# Patient Record
Sex: MALE | Race: WHITE | NOT HISPANIC OR LATINO | Employment: OTHER | URBAN - METROPOLITAN AREA
[De-identification: names, ages, dates, MRNs, and addresses within clinical notes are randomized per-mention and may not be internally consistent; named-entity substitution may affect disease eponyms.]

---

## 2023-08-29 LAB
ALANINE AMINOTRANSFERASE (SGPT) (U/L) IN SER/PLAS: 18 U/L (ref 10–52)
ALBUMIN (G/DL) IN SER/PLAS: 4.6 G/DL (ref 3.4–5)
ALKALINE PHOSPHATASE (U/L) IN SER/PLAS: 74 U/L (ref 33–136)
ANION GAP IN SER/PLAS: 11 MMOL/L (ref 10–20)
ASPARTATE AMINOTRANSFERASE (SGOT) (U/L) IN SER/PLAS: 24 U/L (ref 9–39)
BILIRUBIN TOTAL (MG/DL) IN SER/PLAS: 1.2 MG/DL (ref 0–1.2)
CALCIUM (MG/DL) IN SER/PLAS: 9.9 MG/DL (ref 8.6–10.3)
CARBON DIOXIDE, TOTAL (MMOL/L) IN SER/PLAS: 26 MMOL/L (ref 21–32)
CHLORIDE (MMOL/L) IN SER/PLAS: 106 MMOL/L (ref 98–107)
CHOLESTEROL (MG/DL) IN SER/PLAS: 108 MG/DL (ref 0–199)
CHOLESTEROL IN HDL (MG/DL) IN SER/PLAS: 45.5 MG/DL
CHOLESTEROL/HDL RATIO: 2.4
CREATININE (MG/DL) IN SER/PLAS: 1.33 MG/DL (ref 0.5–1.3)
GFR MALE: 51 ML/MIN/1.73M2
GLUCOSE (MG/DL) IN SER/PLAS: 117 MG/DL (ref 74–99)
LDL: 52 MG/DL (ref 0–99)
MAGNESIUM (MG/DL) IN SER/PLAS: 2.42 MG/DL (ref 1.6–2.4)
POTASSIUM (MMOL/L) IN SER/PLAS: 5.3 MMOL/L (ref 3.5–5.3)
PROTEIN TOTAL: 7.3 G/DL (ref 6.4–8.2)
SODIUM (MMOL/L) IN SER/PLAS: 138 MMOL/L (ref 136–145)
THYROTROPIN (MIU/L) IN SER/PLAS BY DETECTION LIMIT <= 0.05 MIU/L: 3.63 MIU/L (ref 0.44–3.98)
TRIGLYCERIDE (MG/DL) IN SER/PLAS: 51 MG/DL (ref 0–149)
UREA NITROGEN (MG/DL) IN SER/PLAS: 48 MG/DL (ref 6–23)
VLDL: 10 MG/DL (ref 0–40)

## 2023-10-24 DIAGNOSIS — I10 ESSENTIAL (PRIMARY) HYPERTENSION: ICD-10-CM

## 2023-10-24 RX ORDER — SPIRONOLACTONE 25 MG/1
TABLET ORAL DAILY
Qty: 45 TABLET | Refills: 3 | Status: SHIPPED | OUTPATIENT
Start: 2023-10-24

## 2023-11-07 ENCOUNTER — LAB (OUTPATIENT)
Dept: LAB | Facility: LAB | Age: 88
End: 2023-11-07
Payer: MEDICARE

## 2023-11-07 DIAGNOSIS — R73.9 HYPERGLYCEMIA, UNSPECIFIED: ICD-10-CM

## 2023-11-07 DIAGNOSIS — N18.30 CHRONIC KIDNEY DISEASE, STAGE 3 UNSPECIFIED (MULTI): Primary | ICD-10-CM

## 2023-11-07 LAB
ANION GAP SERPL CALC-SCNC: 11 MMOL/L (ref 10–20)
BUN SERPL-MCNC: 34 MG/DL (ref 6–23)
CALCIUM SERPL-MCNC: 9.6 MG/DL (ref 8.6–10.3)
CHLORIDE SERPL-SCNC: 107 MMOL/L (ref 98–107)
CO2 SERPL-SCNC: 25 MMOL/L (ref 21–32)
CREAT SERPL-MCNC: 1.22 MG/DL (ref 0.5–1.3)
EST. AVERAGE GLUCOSE BLD GHB EST-MCNC: 131 MG/DL
GFR SERPL CREATININE-BSD FRML MDRD: 57 ML/MIN/1.73M*2
GLUCOSE SERPL-MCNC: 102 MG/DL (ref 74–99)
HBA1C MFR BLD: 6.2 %
POTASSIUM SERPL-SCNC: 4.5 MMOL/L (ref 3.5–5.3)
SODIUM SERPL-SCNC: 138 MMOL/L (ref 136–145)

## 2023-11-07 PROCEDURE — 83036 HEMOGLOBIN GLYCOSYLATED A1C: CPT

## 2023-11-07 PROCEDURE — 36415 COLL VENOUS BLD VENIPUNCTURE: CPT

## 2023-11-07 PROCEDURE — 80048 BASIC METABOLIC PNL TOTAL CA: CPT

## 2023-12-07 ENCOUNTER — ANCILLARY PROCEDURE (OUTPATIENT)
Dept: RADIOLOGY | Facility: CLINIC | Age: 88
End: 2023-12-07
Payer: MEDICARE

## 2023-12-07 DIAGNOSIS — N64.9 DISORDER OF BREAST: ICD-10-CM

## 2023-12-07 PROCEDURE — G0279 TOMOSYNTHESIS, MAMMO: HCPCS | Performed by: RADIOLOGY

## 2023-12-07 PROCEDURE — 77066 DX MAMMO INCL CAD BI: CPT

## 2023-12-07 PROCEDURE — 77066 DX MAMMO INCL CAD BI: CPT | Performed by: RADIOLOGY

## 2023-12-20 ENCOUNTER — ANCILLARY PROCEDURE (OUTPATIENT)
Dept: RADIOLOGY | Facility: CLINIC | Age: 88
End: 2023-12-20
Payer: MEDICARE

## 2023-12-20 DIAGNOSIS — R92.2 INCONCLUSIVE MAMMOGRAM: ICD-10-CM

## 2023-12-20 DIAGNOSIS — R92.30 DENSE BREASTS, UNSPECIFIED: ICD-10-CM

## 2023-12-20 PROCEDURE — 76642 ULTRASOUND BREAST LIMITED: CPT | Mod: BILATERAL PROCEDURE | Performed by: RADIOLOGY

## 2023-12-20 PROCEDURE — 76642 ULTRASOUND BREAST LIMITED: CPT | Mod: 50

## 2024-02-07 ENCOUNTER — TRANSCRIBE ORDERS (OUTPATIENT)
Dept: SCHEDULING | Age: 89
End: 2024-02-07
Payer: MEDICARE

## 2024-02-07 DIAGNOSIS — R42 DIZZINESS AND GIDDINESS: Primary | ICD-10-CM

## 2024-02-07 DIAGNOSIS — Z13.6 ENCOUNTER FOR SCREENING FOR CARDIOVASCULAR DISORDERS: Primary | ICD-10-CM

## 2024-02-20 ENCOUNTER — APPOINTMENT (OUTPATIENT)
Dept: RADIOLOGY | Facility: HOSPITAL | Age: 89
End: 2024-02-20
Payer: MEDICARE

## 2024-02-20 ENCOUNTER — HOSPITAL ENCOUNTER (EMERGENCY)
Facility: HOSPITAL | Age: 89
Discharge: HOME | End: 2024-02-20
Attending: STUDENT IN AN ORGANIZED HEALTH CARE EDUCATION/TRAINING PROGRAM
Payer: MEDICARE

## 2024-02-20 VITALS
BODY MASS INDEX: 23.54 KG/M2 | TEMPERATURE: 97.2 F | SYSTOLIC BLOOD PRESSURE: 130 MMHG | DIASTOLIC BLOOD PRESSURE: 60 MMHG | RESPIRATION RATE: 16 BRPM | HEART RATE: 68 BPM | OXYGEN SATURATION: 98 % | WEIGHT: 150 LBS | HEIGHT: 67 IN

## 2024-02-20 DIAGNOSIS — S70.02XA CONTUSION OF LEFT HIP, INITIAL ENCOUNTER: Primary | ICD-10-CM

## 2024-02-20 PROBLEM — Z95.818 S/P MITRAL VALVE CLIP IMPLANTATION: Status: ACTIVE | Noted: 2024-02-20

## 2024-02-20 PROBLEM — I25.10 CORONARY ARTERY DISEASE: Status: ACTIVE | Noted: 2024-02-20

## 2024-02-20 PROBLEM — M16.12 ARTHRITIS OF LEFT HIP: Status: ACTIVE | Noted: 2024-02-20

## 2024-02-20 PROBLEM — R06.09 DYSPNEA ON EXERTION: Status: ACTIVE | Noted: 2024-02-20

## 2024-02-20 PROBLEM — M43.17 ACQUIRED SPONDYLOLISTHESIS OF LUMBOSACRAL REGION: Status: ACTIVE | Noted: 2024-02-20

## 2024-02-20 PROBLEM — K57.90 DIVERTICULOSIS: Status: ACTIVE | Noted: 2024-02-20

## 2024-02-20 PROBLEM — I10 HTN (HYPERTENSION): Status: ACTIVE | Noted: 2024-02-20

## 2024-02-20 PROBLEM — E78.5 DYSLIPIDEMIA: Status: ACTIVE | Noted: 2024-02-20

## 2024-02-20 PROBLEM — M19.90 DJD (DEGENERATIVE JOINT DISEASE): Status: ACTIVE | Noted: 2024-02-20

## 2024-02-20 PROBLEM — J44.9 COPD (CHRONIC OBSTRUCTIVE PULMONARY DISEASE) (MULTI): Status: ACTIVE | Noted: 2024-02-20

## 2024-02-20 PROBLEM — I34.0 MITRAL VALVE INSUFFICIENCY: Status: ACTIVE | Noted: 2024-02-20

## 2024-02-20 PROBLEM — Z98.890 S/P MITRAL VALVE CLIP IMPLANTATION: Status: ACTIVE | Noted: 2024-02-20

## 2024-02-20 PROBLEM — C61 PROSTATE CA (MULTI): Status: ACTIVE | Noted: 2024-02-20

## 2024-02-20 PROCEDURE — 73552 X-RAY EXAM OF FEMUR 2/>: CPT | Mod: LT

## 2024-02-20 PROCEDURE — 72170 X-RAY EXAM OF PELVIS: CPT | Mod: FOREIGN READ | Performed by: RADIOLOGY

## 2024-02-20 PROCEDURE — 72170 X-RAY EXAM OF PELVIS: CPT

## 2024-02-20 PROCEDURE — 2500000004 HC RX 250 GENERAL PHARMACY W/ HCPCS (ALT 636 FOR OP/ED): Performed by: STUDENT IN AN ORGANIZED HEALTH CARE EDUCATION/TRAINING PROGRAM

## 2024-02-20 PROCEDURE — 99284 EMERGENCY DEPT VISIT MOD MDM: CPT

## 2024-02-20 PROCEDURE — 96372 THER/PROPH/DIAG INJ SC/IM: CPT

## 2024-02-20 PROCEDURE — 73552 X-RAY EXAM OF FEMUR 2/>: CPT | Mod: LEFT SIDE | Performed by: RADIOLOGY

## 2024-02-20 RX ORDER — ACETAMINOPHEN 325 MG/1
650 TABLET ORAL EVERY 6 HOURS PRN
Qty: 30 TABLET | Refills: 0 | Status: SHIPPED | OUTPATIENT
Start: 2024-02-20

## 2024-02-20 RX ORDER — CYCLOBENZAPRINE HCL 10 MG
5 TABLET ORAL 3 TIMES DAILY
Qty: 15 TABLET | Refills: 0 | Status: SHIPPED | OUTPATIENT
Start: 2024-02-20 | End: 2024-03-01

## 2024-02-20 RX ORDER — ORPHENADRINE CITRATE 30 MG/ML
60 INJECTION INTRAMUSCULAR; INTRAVENOUS ONCE
Status: COMPLETED | OUTPATIENT
Start: 2024-02-20 | End: 2024-02-20

## 2024-02-20 RX ORDER — KETOROLAC TROMETHAMINE 30 MG/ML
30 INJECTION, SOLUTION INTRAMUSCULAR; INTRAVENOUS ONCE
Status: COMPLETED | OUTPATIENT
Start: 2024-02-20 | End: 2024-02-20

## 2024-02-20 RX ORDER — IBUPROFEN 600 MG/1
600 TABLET ORAL EVERY 8 HOURS PRN
Qty: 30 TABLET | Refills: 0 | Status: SHIPPED | OUTPATIENT
Start: 2024-02-20

## 2024-02-20 RX ADMIN — ORPHENADRINE CITRATE 60 MG: 60 INJECTION INTRAMUSCULAR; INTRAVENOUS at 11:39

## 2024-02-20 RX ADMIN — KETOROLAC TROMETHAMINE 30 MG: 30 INJECTION, SOLUTION INTRAMUSCULAR; INTRAVENOUS at 11:39

## 2024-02-20 ASSESSMENT — PAIN DESCRIPTION - ORIENTATION: ORIENTATION: LEFT

## 2024-02-20 ASSESSMENT — PAIN - FUNCTIONAL ASSESSMENT: PAIN_FUNCTIONAL_ASSESSMENT: 0-10

## 2024-02-20 ASSESSMENT — PAIN SCALES - GENERAL: PAINLEVEL_OUTOF10: 1

## 2024-02-20 ASSESSMENT — COLUMBIA-SUICIDE SEVERITY RATING SCALE - C-SSRS
1. IN THE PAST MONTH, HAVE YOU WISHED YOU WERE DEAD OR WISHED YOU COULD GO TO SLEEP AND NOT WAKE UP?: NO
6. HAVE YOU EVER DONE ANYTHING, STARTED TO DO ANYTHING, OR PREPARED TO DO ANYTHING TO END YOUR LIFE?: NO
2. HAVE YOU ACTUALLY HAD ANY THOUGHTS OF KILLING YOURSELF?: NO

## 2024-02-20 ASSESSMENT — PAIN DESCRIPTION - LOCATION: LOCATION: HIP

## 2024-02-20 ASSESSMENT — PAIN DESCRIPTION - PAIN TYPE: TYPE: ACUTE PAIN

## 2024-02-20 NOTE — ED PROVIDER NOTES
HPI   Chief Complaint   Patient presents with    Hip Pain     PT. STATES FELL ONTO LEFT SIDE ABOUT WEEK AND HALF AGO, STATES WAS TRYING TO GO UP A STEP AND WAS UNABLE TO, STATES HAS PROBLEMS WITH LEGS. PT. NOW C/O PAIN TO LEFT HIP, HX JULIO. HIP REPLACEMENTS. DENIES NUMBNESS/TINGLING. PT. AMBULATING WITH CANE.        HPI     Patient is a 88-year-old male present emergency department after a fall, landed on his left side, left hip pain.  No anticoagulation that he is on.  States that he tried to manage his symptoms at home however today he almost fell again and noted the pain in the left hip was more prominent.  Took some Tylenol at home which helped a little bit.  No numbness, tingling.  Is able to range it comfortably.  Denied his head.  No loss conscious.  Otherwise at his baseline state of health.               Arvind Coma Scale Score: 15                     Patient History   Past Medical History:   Diagnosis Date    Hypertension     Personal history of malignant neoplasm of prostate     History of malignant neoplasm of prostate    Personal history of other malignant neoplasm of skin     History of malignant neoplasm of skin     Past Surgical History:   Procedure Laterality Date    MR HEAD ANGIO WO IV CONTRAST  2/22/2023    MR HEAD ANGIO WO IV CONTRAST PAR MRI    MR NECK ANGIO WO IV CONTRAST  2/22/2023    MR NECK ANGIO WO IV CONTRAST PAR MRI    OTHER SURGICAL HISTORY  05/11/2022    Cyst excision    OTHER SURGICAL HISTORY  05/11/2022    Coronary artery stent placement    OTHER SURGICAL HISTORY  05/17/2022    Radical prostatectomy    OTHER SURGICAL HISTORY  05/17/2022    Cardiac catheterization    OTHER SURGICAL HISTORY  05/17/2022    Hip replacement    OTHER SURGICAL HISTORY  05/17/2022    Hip replacement     Family History   Problem Relation Name Age of Onset    Breast cancer Daughter      Breast cancer Maternal Grandmother       Social History     Tobacco Use    Smoking status: Former     Types: Cigarettes     Smokeless tobacco: Never   Vaping Use    Vaping Use: Never used   Substance Use Topics    Alcohol use: Yes     Comment: SOCIALLY    Drug use: Never       Physical Exam   ED Triage Vitals [02/20/24 1039]   Temperature Heart Rate Respirations BP   36.2 °C (97.2 °F) 68 16 130/60      Pulse Ox Temp Source Heart Rate Source Patient Position   98 % Temporal Monitor Sitting      BP Location FiO2 (%)     Right arm --       Physical Exam  Vitals and nursing note reviewed.   Constitutional:       General: He is not in acute distress.     Appearance: He is well-developed.   HENT:      Head: Normocephalic and atraumatic.   Eyes:      Conjunctiva/sclera: Conjunctivae normal.   Cardiovascular:      Rate and Rhythm: Normal rate and regular rhythm.      Heart sounds: No murmur heard.  Pulmonary:      Effort: Pulmonary effort is normal. No respiratory distress.      Breath sounds: Normal breath sounds.   Abdominal:      Palpations: Abdomen is soft.      Tenderness: There is no abdominal tenderness.   Musculoskeletal:         General: Tenderness present. No swelling.      Cervical back: Neck supple.   Skin:     General: Skin is warm and dry.      Capillary Refill: Capillary refill takes less than 2 seconds.   Neurological:      Mental Status: He is alert.   Psychiatric:         Mood and Affect: Mood normal.         ED Course & MDM   Diagnoses as of 02/21/24 1554   Contusion of left hip, initial encounter       Medical Decision Making  88-year-old male presenting as above on bedside assessment comfortable, conversant hemodynamically stable.  Ranges comfortably, reproducible lateral left hip tenderness x-rays pending, has been able to ambulate and range comfortably here low suspicion for fracture.  Given Toradol, Norflex, pending results will plan for disposition of home-going.    X-rays negative for any acute fracture, symptomatically improved ambulating here.  Will be discharged home with outpatient follow-up  recommended.    Procedure  Procedures     Nisa Nascimento MD  02/21/24 5489

## 2024-02-20 NOTE — ED TRIAGE NOTES
PT. STATES FELL ONTO LEFT SIDE ABOUT WEEK AND HALF AGO, STATES WAS TRYING TO GO UP A STEP AND WAS UNABLE TO, STATES HAS PROBLEMS WITH LEGS. PT. NOW C/O PAIN TO LEFT HIP, HX JULIO. HIP REPLACEMENTS. DENIES NUMBNESS/TINGLING. PT. AMBULATING WITH CANE.

## 2024-02-20 NOTE — ED TRIAGE NOTES
This patient was seen and examined in triage    HPI:  Patient is a healthy nontoxic-appearing 88-year-old male with past medical history of COPD, acquired spondylolisthesis of the lumbar sacral region, coronary artery disease, diverticulosis, degenerative joint disease, dyslipidemia, hypertension, mitral valve insufficiency, prostate cancer, hip replacement, presents to the emergency today for complaint of fall.  Patient states 2 weeks ago he was attempting to walk up a set of stairs when he was unable to fully lift his right leg and he fell backwards landing on his left hip.  Patient denies striking his head, loss of consciousness, headache pain with visual disturbances, neck pain, numbness or tingling.  Patient states he has had worsening pain in the left hip left upper leg since the fall and has been using a cane to ambulate.  Patient denies chest pain, short of breath, difficulty breathing, dizziness, lightheadedness, abdominal pain nausea, vomiting or diarrhea or constipation, fever, shaking, or chills.    Focused PE:  Gen: Well-appearing, not in acute distress  Cardiovascular: Regular rate, normal rhythm, no murmur, no gallop  Respiratory: No adventitious lung sounds auscultated.  Neuro:  Alert and Oriented, speech clear and coherent  Musculoskeletal: Reproducible tenderness upon palpation over the left lateral hip and left lateral femur, no knee tenderness, no decreased range of motion    Plan:  X-rays of the left hip and left femur ordered from triage.    For the remainder the patient's work-up and ED course, please see the main ED provider note.  We discussed need for diagnostic testing including lab studies and imaging.  We also discussed that they may be asked to wait in the waiting room while these test are pending.  They understand that if they choose to leave without having the testing completed or resulted that we cannot rule out acute life-threatening illnesses and the risks involved to lead to  worsening condition, permanent disability or even death.

## 2024-02-21 DIAGNOSIS — I10 ESSENTIAL (PRIMARY) HYPERTENSION: ICD-10-CM

## 2024-02-22 RX ORDER — ATORVASTATIN CALCIUM 20 MG/1
20 TABLET, FILM COATED ORAL DAILY
Qty: 90 TABLET | Refills: 1 | Status: SHIPPED | OUTPATIENT
Start: 2024-02-22

## 2024-03-01 PROBLEM — Z98.890 HISTORY OF REPAIR OF HIP JOINT: Status: ACTIVE | Noted: 2024-03-01

## 2024-03-01 PROBLEM — H52.223 HYPEROPIA OF BOTH EYES WITH REGULAR ASTIGMATISM: Status: ACTIVE | Noted: 2024-03-01

## 2024-03-01 PROBLEM — H53.10 EYE STRAIN, BILATERAL: Status: ACTIVE | Noted: 2024-03-01

## 2024-03-01 PROBLEM — I50.9 CONGESTIVE HEART FAILURE (MULTI): Status: ACTIVE | Noted: 2024-03-01

## 2024-03-01 PROBLEM — H52.03 HYPEROPIA OF BOTH EYES WITH REGULAR ASTIGMATISM: Status: ACTIVE | Noted: 2024-03-01

## 2024-03-01 PROBLEM — M54.50 LOW BACK PAIN: Status: ACTIVE | Noted: 2024-03-01

## 2024-03-01 PROBLEM — S70.00XA CONTUSION OF HIP: Status: ACTIVE | Noted: 2024-03-01

## 2024-03-01 PROBLEM — H52.223 REGULAR ASTIGMATISM OF BOTH EYES WITH PRESBYOPIA: Status: ACTIVE | Noted: 2024-03-01

## 2024-03-01 PROBLEM — E78.5 HYPERLIPOPROTEINEMIA: Status: ACTIVE | Noted: 2024-02-20

## 2024-03-01 PROBLEM — H25.13 CATARACT, NUCLEAR SCLEROTIC, BOTH EYES: Status: ACTIVE | Noted: 2023-05-02

## 2024-03-01 PROBLEM — Z85.828 HISTORY OF MALIGNANT NEOPLASM OF SKIN: Status: ACTIVE | Noted: 2024-03-01

## 2024-03-01 PROBLEM — M25.552 LEFT HIP PAIN: Status: ACTIVE | Noted: 2024-03-01

## 2024-03-01 PROBLEM — H52.4 REGULAR ASTIGMATISM OF BOTH EYES WITH PRESBYOPIA: Status: ACTIVE | Noted: 2024-03-01

## 2024-03-05 ENCOUNTER — HOSPITAL ENCOUNTER (OUTPATIENT)
Dept: VASCULAR MEDICINE | Facility: CLINIC | Age: 89
End: 2024-03-05
Payer: MEDICARE

## 2024-03-05 ENCOUNTER — LAB (OUTPATIENT)
Dept: LAB | Facility: LAB | Age: 89
End: 2024-03-05
Payer: MEDICARE

## 2024-03-05 ENCOUNTER — HOSPITAL ENCOUNTER (OUTPATIENT)
Dept: VASCULAR MEDICINE | Facility: CLINIC | Age: 89
Discharge: HOME | End: 2024-03-05
Payer: MEDICARE

## 2024-03-05 DIAGNOSIS — R73.9 HYPERGLYCEMIA, UNSPECIFIED: Primary | ICD-10-CM

## 2024-03-05 DIAGNOSIS — I10 ESSENTIAL (PRIMARY) HYPERTENSION: ICD-10-CM

## 2024-03-05 DIAGNOSIS — E83.52 HYPERCALCEMIA: ICD-10-CM

## 2024-03-05 DIAGNOSIS — Z13.6 ENCOUNTER FOR SCREENING FOR CARDIOVASCULAR DISORDERS: ICD-10-CM

## 2024-03-05 DIAGNOSIS — R42 DIZZINESS AND GIDDINESS: ICD-10-CM

## 2024-03-05 LAB
ANION GAP SERPL CALC-SCNC: 13 MMOL/L (ref 10–20)
BUN SERPL-MCNC: 45 MG/DL (ref 6–23)
CALCIUM SERPL-MCNC: 9.5 MG/DL (ref 8.6–10.3)
CHLORIDE SERPL-SCNC: 107 MMOL/L (ref 98–107)
CO2 SERPL-SCNC: 28 MMOL/L (ref 21–32)
CREAT SERPL-MCNC: 1.27 MG/DL (ref 0.5–1.3)
EGFRCR SERPLBLD CKD-EPI 2021: 54 ML/MIN/1.73M*2
EST. AVERAGE GLUCOSE BLD GHB EST-MCNC: 134 MG/DL
GLUCOSE SERPL-MCNC: 111 MG/DL (ref 74–99)
HBA1C MFR BLD: 6.3 %
POTASSIUM SERPL-SCNC: 4.5 MMOL/L (ref 3.5–5.3)
PTH-INTACT SERPL-MCNC: 60.5 PG/ML (ref 18.5–88)
SODIUM SERPL-SCNC: 143 MMOL/L (ref 136–145)

## 2024-03-05 PROCEDURE — 36415 COLL VENOUS BLD VENIPUNCTURE: CPT

## 2024-03-05 PROCEDURE — 80048 BASIC METABOLIC PNL TOTAL CA: CPT

## 2024-03-05 PROCEDURE — 93880 EXTRACRANIAL BILAT STUDY: CPT

## 2024-03-05 PROCEDURE — 93880 EXTRACRANIAL BILAT STUDY: CPT | Performed by: INTERNAL MEDICINE

## 2024-03-05 PROCEDURE — 83036 HEMOGLOBIN GLYCOSYLATED A1C: CPT

## 2024-03-05 PROCEDURE — 83970 ASSAY OF PARATHORMONE: CPT

## 2024-03-06 ENCOUNTER — OFFICE VISIT (OUTPATIENT)
Dept: CARDIOLOGY | Facility: CLINIC | Age: 89
End: 2024-03-06
Payer: MEDICARE

## 2024-03-06 VITALS
DIASTOLIC BLOOD PRESSURE: 60 MMHG | RESPIRATION RATE: 16 BRPM | WEIGHT: 150 LBS | OXYGEN SATURATION: 97 % | HEART RATE: 61 BPM | SYSTOLIC BLOOD PRESSURE: 102 MMHG | BODY MASS INDEX: 23.49 KG/M2

## 2024-03-06 DIAGNOSIS — I50.42 CHRONIC COMBINED SYSTOLIC AND DIASTOLIC CONGESTIVE HEART FAILURE (MULTI): ICD-10-CM

## 2024-03-06 DIAGNOSIS — Z95.818 S/P MITRAL VALVE CLIP IMPLANTATION: Primary | ICD-10-CM

## 2024-03-06 DIAGNOSIS — Z98.890 S/P MITRAL VALVE CLIP IMPLANTATION: Primary | ICD-10-CM

## 2024-03-06 PROCEDURE — 1125F AMNT PAIN NOTED PAIN PRSNT: CPT | Performed by: STUDENT IN AN ORGANIZED HEALTH CARE EDUCATION/TRAINING PROGRAM

## 2024-03-06 PROCEDURE — 1036F TOBACCO NON-USER: CPT | Performed by: STUDENT IN AN ORGANIZED HEALTH CARE EDUCATION/TRAINING PROGRAM

## 2024-03-06 PROCEDURE — 1157F ADVNC CARE PLAN IN RCRD: CPT | Performed by: STUDENT IN AN ORGANIZED HEALTH CARE EDUCATION/TRAINING PROGRAM

## 2024-03-06 PROCEDURE — 99214 OFFICE O/P EST MOD 30 MIN: CPT | Performed by: STUDENT IN AN ORGANIZED HEALTH CARE EDUCATION/TRAINING PROGRAM

## 2024-03-06 PROCEDURE — 3078F DIAST BP <80 MM HG: CPT | Performed by: STUDENT IN AN ORGANIZED HEALTH CARE EDUCATION/TRAINING PROGRAM

## 2024-03-06 PROCEDURE — 3074F SYST BP LT 130 MM HG: CPT | Performed by: STUDENT IN AN ORGANIZED HEALTH CARE EDUCATION/TRAINING PROGRAM

## 2024-03-06 NOTE — PROGRESS NOTES
Signed  Encounter Date: 9/20/2023  Nelson Shin MD PhD         Diagnoses/Problems  Assessed    · Coronary artery disease (414.00) (I25.10)   · HTN (hypertension) (401.9) (I10)     Orders  Coronary artery disease, HTN (hypertension)    · Basic Metabolic Panel; Status:Active - Retrospective By Protocol Authorization;  Requested for:[id number];    · Magnesium, Serum; Status:Active - Retrospective By Protocol Authorization; Requested  for:[id number];      Chief Complaint        RAMSES VALENTE is being seen for a 6 month follow-up of. Echo, labs results      History of Present Illness  May 23, 2022  Patient is an 87-year-old male who presented with non-ST elevation MI to [facility] in May 2022. He was found to have ejection fraction of 40 to 45% with severe mitral regurgitation. He underwent right and left heart catheterization and he was found to have significant lesion in proximal to mid LAD which was treated successfully with a 2.75 x 38 mm Synergy drug-eluting stent postdilated with a 3.0 NC balloon. He underwent transesophageal echocardiogram that confirmed the presence of severe mitral regurgitation. He was sent to be evaluated at [facility] the structural team for consideration of MitraClip. He had the initial visit done last week. Awaiting to hear back.  Currently patient has shortness of breath with activity. Denies having chest pain. Currently takes torsemide 20 mg daily his EKG today shows sinus rhythm with a rate of 59 and left bundle branch block pattern     August 18, 2022  Patient had echocardiogram done yesterday that showed ejection fraction of 35 to 40% and a cardiac MRI was suggested for further evaluation of the valves after the MitraClip placement.  Patient himself today reports having no shortness of breath or palpitation no chest pain. Reports overall he is doing well.     November 16, 2022  Patient has received his MitraClip. MRI post that showed mild residual mitral regurgitation with  ejection fraction of 35%.  Denies having chest pain or shortness of breath.     March 8, 2023  Patient denies having chest pain or shortness of breath or palpitation.  He had his echocardiogram done on March 1, 2023 that showed ejection fraction of 45 to 50%, status post MitraClip with no significant residual mitral regurgitation. He also has moderate aortic valve stenosis.  His weight has been stable since last time.     September 20, 2023  Patient had his echocardiogram done today that shows mildly reduced ejection fraction of 45 to 50%. There is residual mild MR. He has mild aortic valve stenosis.  He denies having chest pain or shortness of breath or palpitation and reports he has been walking regularly and hiking.  His labs from August 2023 showed creatinine of 1.3.             Follow up visit    03/06/24    Patrick Vázquez is a 88 y.o. male who is here for follow-up after about 6 months.  His main complaint is sometimes he had dizziness and had a fall.  Primary care physician stopped Lasix.  He feels better since then.      Patient denies any chest pain, shortness of breath, palpitation.    Past Medical History  Past Medical History:   Diagnosis Date    Hypertension     Personal history of malignant neoplasm of prostate     History of malignant neoplasm of prostate    Personal history of other malignant neoplasm of skin     History of malignant neoplasm of skin        Past Surgical History  Past Surgical History:   Procedure Laterality Date    MR HEAD ANGIO WO IV CONTRAST  2/22/2023    MR HEAD ANGIO WO IV CONTRAST PAR MRI    MR NECK ANGIO WO IV CONTRAST  2/22/2023    MR NECK ANGIO WO IV CONTRAST PAR MRI    OTHER SURGICAL HISTORY  05/11/2022    Cyst excision    OTHER SURGICAL HISTORY  05/11/2022    Coronary artery stent placement    OTHER SURGICAL HISTORY  05/17/2022    Radical prostatectomy    OTHER SURGICAL HISTORY  05/17/2022    Cardiac catheterization    OTHER SURGICAL HISTORY  05/17/2022    Hip replacement     OTHER SURGICAL HISTORY  05/17/2022    Hip replacement        Social history  Social History     Socioeconomic History    Marital status:      Spouse name: Not on file    Number of children: Not on file    Years of education: Not on file    Highest education level: Not on file   Occupational History    Not on file   Tobacco Use    Smoking status: Former     Types: Cigarettes    Smokeless tobacco: Never   Vaping Use    Vaping Use: Never used   Substance and Sexual Activity    Alcohol use: Yes     Comment: SOCIALLY    Drug use: Never    Sexual activity: Not on file   Other Topics Concern    Not on file   Social History Narrative    Not on file     Social Determinants of Health     Financial Resource Strain: Not on file   Food Insecurity: Not on file   Transportation Needs: Not on file   Physical Activity: Not on file   Stress: Not on file   Social Connections: Not on file   Intimate Partner Violence: Not on file   Housing Stability: Not on file        Family History  Family History   Problem Relation Name Age of Onset    Breast cancer Daughter      Breast cancer Maternal Grandmother          12 system point of review is negative except for what described in history of present illness    Allergies:  Allergies   Allergen Reactions    Bee Venom Protein (Honey Bee) Hives and Itching     carries an epi pen        Outpatient Medications:  Current Outpatient Medications   Medication Instructions    acetaminophen (TYLENOL) 650 mg, oral, Every 6 hours PRN    atorvastatin (LIPITOR) 20 mg, oral, Daily    cyclobenzaprine (FLEXERIL) 5 mg, oral, 3 times daily    ibuprofen 600 mg, oral, Every 8 hours PRN    spironolactone (Aldactone) 25 mg tablet oral, Daily         Last Recorded Vitals:      8/18/2022     1:13 PM 11/16/2022    10:36 AM 2/22/2023    11:56 AM 2/22/2023     8:53 PM 3/8/2023     9:59 AM 2/20/2024    10:39 AM 3/6/2024    10:15 AM   Vitals   Systolic 94 116 112  115 130 102   Diastolic 46 60 59  62 60 60   Heart  "Rate 68 67 68  70 68 61   Temp   36 °C (96.8 °F)   36.2 °C (97.2 °F)    Resp   18   16 16   Height (in) 1.727 m (5' 8\") 1.727 m (5' 8\") 1.727 m (5' 7.99\") 1.727 m (5' 7.99\") 1.727 m (5' 8\") 1.702 m (5' 7\")    Weight (lb) 154.13 157 149.91 149.91 158 150 150   BMI 23.43 kg/m2 23.87 kg/m2 22.8 kg/m2 22.8 kg/m2 24.02 kg/m2 23.49 kg/m2 23.49 kg/m2   BSA (m2) 1.83 m2 1.85 m2 1.81 m2 1.81 m2 1.85 m2 1.79 m2 1.79 m2   Visit Report       Report    Visit Vitals  /60 (BP Location: Left arm, Patient Position: Sitting)   Pulse 61   Resp 16   Wt 68 kg (150 lb)   SpO2 97%   BMI 23.49 kg/m²   Smoking Status Former   BSA 1.79 m²        Physical Exam:    General: Awake, alert/oriented x3, well developed, no acute distress.  Frail.  Walks with cane.  Head: Atraumatic/Normocephalic  Eyes: Normal external exam, EOMI, PERRLA  ENT: Oropharynx normal, moist mucous membranes  Cardiovascular: RRR, S1/S2, no murmurs, rubs, or gallops, radial pulses +2, no edema of extremities  Pulmonary: CTAB, no respiratory distress. No wheezes, rales, or ronchi  Abdomen: +BS, soft, non-tender, nondistended, no guarding or rebound  MSK: No joint swelling, normal movements of all extremities. Range of motion- normal.  Extremities: no edema, no cyanosis  Neuro: Alert/oriented x3, no focal motor or sensory deficits  Psychiatric: Judgment intact. Appropriate mood and behavior      I reviewed recent available cardiac studies.      Labs    Lab Results   Component Value Date    WBC 4.9 02/23/2023    HGB 12.5 (L) 02/23/2023    HCT 38.4 (L) 02/23/2023     02/23/2023    CHOL 108 08/29/2023    TRIG 51 08/29/2023    HDL 45.5 08/29/2023    ALT 18 08/29/2023    AST 24 08/29/2023     03/05/2024    K 4.5 03/05/2024     03/05/2024    CREATININE 1.27 03/05/2024    BUN 45 (H) 03/05/2024    CO2 28 03/05/2024    TSH 3.63 08/29/2023    INR 1.0 02/22/2023    HGBA1C 6.3 (H) 03/05/2024     No results found for: \"CKTOTAL\", \"CKMB\", \"CKMBINDEX\", \"TROPONINI\" "     Lab Results   Component Value Date    INR 1.0 02/22/2023    INR 1.0 07/22/2022    INR 1.3 (H) 07/15/2022    PROTIME 11.8 02/22/2023    PROTIME 11.9 07/22/2022    PROTIME 14.5 (H) 07/15/2022             Assessment/Plan     Patient overall is doing well from a cardiac standpoint.  I agree with no more Lasix at this point.  Volume status is good.  Will obtain echocardiogram before next visit to evaluate cardiac function and mitral valve.    We will continue with current medications.    Discussed the importance of heart healthy diet and exercise.    Follow-up in clinic in 1 year with prior labs including CMP, Mg  EKG at the time of visit.           Nelson Shin MD, PhD, FACC, [facility]  Interventional Cardiology, [facility]  Associate Professor of Medicine, [facility]  Office: [phone]         **Disclaimer: This note was dictated by speech recognition, and every effort has been made to prevent any error in transcription, however minor errors may be present**

## 2024-03-20 DIAGNOSIS — I10 ESSENTIAL (PRIMARY) HYPERTENSION: ICD-10-CM

## 2024-03-22 RX ORDER — METOPROLOL SUCCINATE 25 MG/1
TABLET, EXTENDED RELEASE ORAL
Qty: 45 TABLET | Refills: 2 | Status: SHIPPED | OUTPATIENT
Start: 2024-03-22

## 2024-04-22 DIAGNOSIS — I25.10 ATHEROSCLEROTIC HEART DISEASE OF NATIVE CORONARY ARTERY WITHOUT ANGINA PECTORIS: ICD-10-CM

## 2024-04-23 RX ORDER — CLOPIDOGREL BISULFATE 75 MG/1
75 TABLET ORAL DAILY
Qty: 90 TABLET | Refills: 3 | Status: SHIPPED | OUTPATIENT
Start: 2024-04-23

## 2024-08-02 ENCOUNTER — LAB (OUTPATIENT)
Dept: LAB | Facility: LAB | Age: 89
End: 2024-08-02
Payer: MEDICARE

## 2024-08-02 DIAGNOSIS — R73.9 HYPERGLYCEMIA, UNSPECIFIED: ICD-10-CM

## 2024-08-02 DIAGNOSIS — N18.30 CHRONIC KIDNEY DISEASE, STAGE 3 UNSPECIFIED (MULTI): ICD-10-CM

## 2024-08-02 DIAGNOSIS — I10 ESSENTIAL (PRIMARY) HYPERTENSION: Primary | ICD-10-CM

## 2024-08-02 LAB
ALBUMIN SERPL BCP-MCNC: 4.3 G/DL (ref 3.4–5)
ALP SERPL-CCNC: 85 U/L (ref 33–136)
ALT SERPL W P-5'-P-CCNC: 20 U/L (ref 10–52)
ANION GAP SERPL CALC-SCNC: 13 MMOL/L (ref 10–20)
AST SERPL W P-5'-P-CCNC: 24 U/L (ref 9–39)
BASOPHILS # BLD AUTO: 0.04 X10*3/UL (ref 0–0.1)
BASOPHILS NFR BLD AUTO: 0.8 %
BILIRUB SERPL-MCNC: 1.4 MG/DL (ref 0–1.2)
BUN SERPL-MCNC: 32 MG/DL (ref 6–23)
CALCIUM SERPL-MCNC: 9.4 MG/DL (ref 8.6–10.3)
CHLORIDE SERPL-SCNC: 109 MMOL/L (ref 98–107)
CO2 SERPL-SCNC: 25 MMOL/L (ref 21–32)
CREAT SERPL-MCNC: 1.21 MG/DL (ref 0.5–1.3)
EGFRCR SERPLBLD CKD-EPI 2021: 57 ML/MIN/1.73M*2
EOSINOPHIL # BLD AUTO: 0.11 X10*3/UL (ref 0–0.4)
EOSINOPHIL NFR BLD AUTO: 2.2 %
ERYTHROCYTE [DISTWIDTH] IN BLOOD BY AUTOMATED COUNT: 13.4 % (ref 11.5–14.5)
EST. AVERAGE GLUCOSE BLD GHB EST-MCNC: 131 MG/DL
GLUCOSE SERPL-MCNC: 103 MG/DL (ref 74–99)
HBA1C MFR BLD: 6.2 %
HCT VFR BLD AUTO: 42 % (ref 41–52)
HGB BLD-MCNC: 13.7 G/DL (ref 13.5–17.5)
IMM GRANULOCYTES # BLD AUTO: 0.01 X10*3/UL (ref 0–0.5)
IMM GRANULOCYTES NFR BLD AUTO: 0.2 % (ref 0–0.9)
LYMPHOCYTES # BLD AUTO: 1.66 X10*3/UL (ref 0.8–3)
LYMPHOCYTES NFR BLD AUTO: 33.1 %
MCH RBC QN AUTO: 32.4 PG (ref 26–34)
MCHC RBC AUTO-ENTMCNC: 32.6 G/DL (ref 32–36)
MCV RBC AUTO: 99 FL (ref 80–100)
MONOCYTES # BLD AUTO: 0.56 X10*3/UL (ref 0.05–0.8)
MONOCYTES NFR BLD AUTO: 11.2 %
NEUTROPHILS # BLD AUTO: 2.64 X10*3/UL (ref 1.6–5.5)
NEUTROPHILS NFR BLD AUTO: 52.5 %
NRBC BLD-RTO: 0 /100 WBCS (ref 0–0)
PLATELET # BLD AUTO: 176 X10*3/UL (ref 150–450)
POTASSIUM SERPL-SCNC: 4.5 MMOL/L (ref 3.5–5.3)
PROT SERPL-MCNC: 6.6 G/DL (ref 6.4–8.2)
RBC # BLD AUTO: 4.23 X10*6/UL (ref 4.5–5.9)
SODIUM SERPL-SCNC: 142 MMOL/L (ref 136–145)
WBC # BLD AUTO: 5 X10*3/UL (ref 4.4–11.3)

## 2024-08-02 PROCEDURE — 83036 HEMOGLOBIN GLYCOSYLATED A1C: CPT

## 2024-08-02 PROCEDURE — 85025 COMPLETE CBC W/AUTO DIFF WBC: CPT

## 2024-08-02 PROCEDURE — 80053 COMPREHEN METABOLIC PANEL: CPT

## 2024-08-02 PROCEDURE — 36415 COLL VENOUS BLD VENIPUNCTURE: CPT

## 2024-08-29 DIAGNOSIS — I10 ESSENTIAL (PRIMARY) HYPERTENSION: ICD-10-CM

## 2024-08-29 RX ORDER — ATORVASTATIN CALCIUM 20 MG/1
20 TABLET, FILM COATED ORAL DAILY
Qty: 90 TABLET | Refills: 1 | Status: SHIPPED | OUTPATIENT
Start: 2024-08-29

## 2024-09-25 ENCOUNTER — TELEPHONE (OUTPATIENT)
Dept: CARDIOLOGY | Facility: CLINIC | Age: 89
End: 2024-09-25
Payer: MEDICARE

## 2024-09-25 NOTE — TELEPHONE ENCOUNTER
Patrick has had increased SOB and Fatigue. He checks his b/p daily and it has been normal around 120/80. Please advise.

## 2024-10-11 ENCOUNTER — HOSPITAL ENCOUNTER (OUTPATIENT)
Dept: RADIOLOGY | Facility: CLINIC | Age: 89
Discharge: HOME | End: 2024-10-11
Payer: MEDICARE

## 2024-10-11 DIAGNOSIS — R05.9 COUGH, UNSPECIFIED: ICD-10-CM

## 2024-10-11 PROCEDURE — 71046 X-RAY EXAM CHEST 2 VIEWS: CPT

## 2024-10-17 DIAGNOSIS — I10 ESSENTIAL (PRIMARY) HYPERTENSION: ICD-10-CM

## 2024-10-18 RX ORDER — SPIRONOLACTONE 25 MG/1
12.5 TABLET ORAL DAILY
Qty: 45 TABLET | Refills: 1 | Status: SHIPPED | OUTPATIENT
Start: 2024-10-18

## 2024-10-29 ENCOUNTER — LAB (OUTPATIENT)
Dept: LAB | Facility: LAB | Age: 89
End: 2024-10-29
Payer: MEDICARE

## 2024-10-29 ENCOUNTER — HOSPITAL ENCOUNTER (OUTPATIENT)
Dept: RADIOLOGY | Facility: CLINIC | Age: 89
Discharge: HOME | End: 2024-10-29
Payer: MEDICARE

## 2024-10-29 DIAGNOSIS — R73.9 HYPERGLYCEMIA, UNSPECIFIED: Primary | ICD-10-CM

## 2024-10-29 DIAGNOSIS — R93.89 ABNORMAL FINDINGS ON DIAGNOSTIC IMAGING OF OTHER SPECIFIED BODY STRUCTURES: ICD-10-CM

## 2024-10-29 LAB
ALBUMIN SERPL BCP-MCNC: 4.5 G/DL (ref 3.4–5)
ALP SERPL-CCNC: 101 U/L (ref 33–136)
ALT SERPL W P-5'-P-CCNC: 29 U/L (ref 10–52)
ANION GAP SERPL CALC-SCNC: 13 MMOL/L (ref 10–20)
AST SERPL W P-5'-P-CCNC: 35 U/L (ref 9–39)
BILIRUB DIRECT SERPL-MCNC: 0.6 MG/DL (ref 0–0.3)
BILIRUB SERPL-MCNC: 2.3 MG/DL (ref 0–1.2)
BUN SERPL-MCNC: 33 MG/DL (ref 6–23)
CALCIUM SERPL-MCNC: 9.9 MG/DL (ref 8.6–10.3)
CHLORIDE SERPL-SCNC: 109 MMOL/L (ref 98–107)
CO2 SERPL-SCNC: 25 MMOL/L (ref 21–32)
CREAT SERPL-MCNC: 1.21 MG/DL (ref 0.5–1.3)
EGFRCR SERPLBLD CKD-EPI 2021: 57 ML/MIN/1.73M*2
EST. AVERAGE GLUCOSE BLD GHB EST-MCNC: 137 MG/DL
GLUCOSE SERPL-MCNC: 106 MG/DL (ref 74–99)
HBA1C MFR BLD: 6.4 %
POTASSIUM SERPL-SCNC: 5 MMOL/L (ref 3.5–5.3)
PROT SERPL-MCNC: 6.8 G/DL (ref 6.4–8.2)
SODIUM SERPL-SCNC: 142 MMOL/L (ref 136–145)

## 2024-10-29 PROCEDURE — 82248 BILIRUBIN DIRECT: CPT

## 2024-10-29 PROCEDURE — 71250 CT THORAX DX C-: CPT

## 2024-10-29 PROCEDURE — 80053 COMPREHEN METABOLIC PANEL: CPT

## 2024-10-29 PROCEDURE — 71250 CT THORAX DX C-: CPT | Performed by: RADIOLOGY

## 2024-10-29 PROCEDURE — 36415 COLL VENOUS BLD VENIPUNCTURE: CPT

## 2024-10-29 PROCEDURE — 83036 HEMOGLOBIN GLYCOSYLATED A1C: CPT

## 2024-11-01 ENCOUNTER — APPOINTMENT (OUTPATIENT)
Dept: RADIOLOGY | Facility: HOSPITAL | Age: 89
DRG: 280 | End: 2024-11-01
Payer: MEDICARE

## 2024-11-01 ENCOUNTER — APPOINTMENT (OUTPATIENT)
Dept: CARDIOLOGY | Facility: HOSPITAL | Age: 89
DRG: 280 | End: 2024-11-01
Payer: MEDICARE

## 2024-11-01 ENCOUNTER — HOSPITAL ENCOUNTER (INPATIENT)
Facility: HOSPITAL | Age: 89
LOS: 2 days | Discharge: HOME | DRG: 280 | End: 2024-11-03
Attending: INTERNAL MEDICINE | Admitting: INTERNAL MEDICINE
Payer: MEDICARE

## 2024-11-01 DIAGNOSIS — I50.43 ACUTE ON CHRONIC COMBINED SYSTOLIC AND DIASTOLIC CONGESTIVE HEART FAILURE: ICD-10-CM

## 2024-11-01 DIAGNOSIS — I50.33 CHF (CONGESTIVE HEART FAILURE), NYHA CLASS II, ACUTE ON CHRONIC, DIASTOLIC: Primary | ICD-10-CM

## 2024-11-01 DIAGNOSIS — R94.31 PROLONGED Q-T INTERVAL ON ECG: ICD-10-CM

## 2024-11-01 DIAGNOSIS — R06.02 SHORTNESS OF BREATH: ICD-10-CM

## 2024-11-01 LAB
ALBUMIN SERPL BCP-MCNC: 4.5 G/DL (ref 3.4–5)
ALP SERPL-CCNC: 99 U/L (ref 33–136)
ALT SERPL W P-5'-P-CCNC: 27 U/L (ref 10–52)
ANION GAP SERPL CALC-SCNC: 18 MMOL/L (ref 10–20)
AST SERPL W P-5'-P-CCNC: 56 U/L (ref 9–39)
BASOPHILS # BLD AUTO: 0.03 X10*3/UL (ref 0–0.1)
BASOPHILS NFR BLD AUTO: 0.5 %
BILIRUB SERPL-MCNC: 2.2 MG/DL (ref 0–1.2)
BNP SERPL-MCNC: 628 PG/ML (ref 0–99)
BUN SERPL-MCNC: 27 MG/DL (ref 6–23)
CALCIUM SERPL-MCNC: 9.4 MG/DL (ref 8.6–10.3)
CARDIAC TROPONIN I PNL SERPL HS: 59 NG/L (ref 0–20)
CARDIAC TROPONIN I PNL SERPL HS: 65 NG/L (ref 0–20)
CHLORIDE SERPL-SCNC: 106 MMOL/L (ref 98–107)
CO2 SERPL-SCNC: 19 MMOL/L (ref 21–32)
CREAT SERPL-MCNC: 1.04 MG/DL (ref 0.5–1.3)
EGFRCR SERPLBLD CKD-EPI 2021: 69 ML/MIN/1.73M*2
EOSINOPHIL # BLD AUTO: 0.04 X10*3/UL (ref 0–0.4)
EOSINOPHIL NFR BLD AUTO: 0.6 %
ERYTHROCYTE [DISTWIDTH] IN BLOOD BY AUTOMATED COUNT: 15.1 % (ref 11.5–14.5)
FLUAV RNA RESP QL NAA+PROBE: NOT DETECTED
FLUBV RNA RESP QL NAA+PROBE: NOT DETECTED
GLUCOSE SERPL-MCNC: 113 MG/DL (ref 74–99)
HCT VFR BLD AUTO: 40.5 % (ref 41–52)
HGB BLD-MCNC: 13.1 G/DL (ref 13.5–17.5)
IMM GRANULOCYTES # BLD AUTO: 0.02 X10*3/UL (ref 0–0.5)
IMM GRANULOCYTES NFR BLD AUTO: 0.3 % (ref 0–0.9)
INR PPP: 1.3 (ref 0.9–1.1)
LYMPHOCYTES # BLD AUTO: 0.82 X10*3/UL (ref 0.8–3)
LYMPHOCYTES NFR BLD AUTO: 13 %
MAGNESIUM SERPL-MCNC: 2.04 MG/DL (ref 1.6–2.4)
MCH RBC QN AUTO: 32.1 PG (ref 26–34)
MCHC RBC AUTO-ENTMCNC: 32.3 G/DL (ref 32–36)
MCV RBC AUTO: 99 FL (ref 80–100)
MONOCYTES # BLD AUTO: 0.49 X10*3/UL (ref 0.05–0.8)
MONOCYTES NFR BLD AUTO: 7.8 %
NEUTROPHILS # BLD AUTO: 4.9 X10*3/UL (ref 1.6–5.5)
NEUTROPHILS NFR BLD AUTO: 77.8 %
NRBC BLD-RTO: 0 /100 WBCS (ref 0–0)
PLATELET # BLD AUTO: 107 X10*3/UL (ref 150–450)
POTASSIUM SERPL-SCNC: 4.7 MMOL/L (ref 3.5–5.3)
POTASSIUM SERPL-SCNC: 6.6 MMOL/L (ref 3.5–5.3)
PROT SERPL-MCNC: 7.2 G/DL (ref 6.4–8.2)
PROTHROMBIN TIME: 14.5 SECONDS (ref 9.8–12.8)
RBC # BLD AUTO: 4.08 X10*6/UL (ref 4.5–5.9)
RSV RNA RESP QL NAA+PROBE: NOT DETECTED
SARS-COV-2 RNA RESP QL NAA+PROBE: NOT DETECTED
SODIUM SERPL-SCNC: 136 MMOL/L (ref 136–145)
WBC # BLD AUTO: 6.3 X10*3/UL (ref 4.4–11.3)

## 2024-11-01 PROCEDURE — 93005 ELECTROCARDIOGRAM TRACING: CPT

## 2024-11-01 PROCEDURE — 36415 COLL VENOUS BLD VENIPUNCTURE: CPT

## 2024-11-01 PROCEDURE — 99285 EMERGENCY DEPT VISIT HI MDM: CPT | Mod: 25

## 2024-11-01 PROCEDURE — 84484 ASSAY OF TROPONIN QUANT: CPT

## 2024-11-01 PROCEDURE — 71046 X-RAY EXAM CHEST 2 VIEWS: CPT | Performed by: RADIOLOGY

## 2024-11-01 PROCEDURE — 85025 COMPLETE CBC W/AUTO DIFF WBC: CPT

## 2024-11-01 PROCEDURE — 84132 ASSAY OF SERUM POTASSIUM: CPT | Performed by: INTERNAL MEDICINE

## 2024-11-01 PROCEDURE — 85610 PROTHROMBIN TIME: CPT

## 2024-11-01 PROCEDURE — 84075 ASSAY ALKALINE PHOSPHATASE: CPT

## 2024-11-01 PROCEDURE — 87637 SARSCOV2&INF A&B&RSV AMP PRB: CPT

## 2024-11-01 PROCEDURE — 2500000004 HC RX 250 GENERAL PHARMACY W/ HCPCS (ALT 636 FOR OP/ED)

## 2024-11-01 PROCEDURE — 71046 X-RAY EXAM CHEST 2 VIEWS: CPT

## 2024-11-01 PROCEDURE — 96374 THER/PROPH/DIAG INJ IV PUSH: CPT

## 2024-11-01 PROCEDURE — 83735 ASSAY OF MAGNESIUM: CPT

## 2024-11-01 PROCEDURE — 83880 ASSAY OF NATRIURETIC PEPTIDE: CPT

## 2024-11-01 PROCEDURE — 1200000002 HC GENERAL ROOM WITH TELEMETRY DAILY

## 2024-11-01 RX ORDER — VALSARTAN 80 MG/1
160 TABLET ORAL DAILY
Status: DISCONTINUED | OUTPATIENT
Start: 2024-11-02 | End: 2024-11-03 | Stop reason: HOSPADM

## 2024-11-01 RX ORDER — METOPROLOL SUCCINATE 25 MG/1
12.5 TABLET, EXTENDED RELEASE ORAL DAILY
Status: DISCONTINUED | OUTPATIENT
Start: 2024-11-02 | End: 2024-11-03 | Stop reason: HOSPADM

## 2024-11-01 RX ORDER — AMLODIPINE BESYLATE 5 MG/1
2.5 TABLET ORAL DAILY
Status: DISCONTINUED | OUTPATIENT
Start: 2024-11-02 | End: 2024-11-03

## 2024-11-01 RX ORDER — CLOPIDOGREL BISULFATE 75 MG/1
75 TABLET ORAL DAILY
Status: DISCONTINUED | OUTPATIENT
Start: 2024-11-02 | End: 2024-11-02

## 2024-11-01 RX ORDER — FLUTICASONE FUROATE AND VILANTEROL 200; 25 UG/1; UG/1
1 POWDER RESPIRATORY (INHALATION)
Status: DISCONTINUED | OUTPATIENT
Start: 2024-11-01 | End: 2024-11-03 | Stop reason: HOSPADM

## 2024-11-01 RX ORDER — ASPIRIN 81 MG/1
81 TABLET ORAL DAILY
Status: DISCONTINUED | OUTPATIENT
Start: 2024-11-02 | End: 2024-11-03 | Stop reason: HOSPADM

## 2024-11-01 RX ORDER — ASPIRIN 81 MG/1
81 TABLET ORAL DAILY
COMMUNITY

## 2024-11-01 RX ORDER — ATORVASTATIN CALCIUM 20 MG/1
20 TABLET, FILM COATED ORAL DAILY
Status: DISCONTINUED | OUTPATIENT
Start: 2024-11-02 | End: 2024-11-03 | Stop reason: HOSPADM

## 2024-11-01 RX ORDER — BUDESONIDE, GLYCOPYRROLATE, AND FORMOTEROL FUMARATE 160; 9; 4.8 UG/1; UG/1; UG/1
2 AEROSOL, METERED RESPIRATORY (INHALATION) 2 TIMES DAILY
COMMUNITY
Start: 2024-10-18

## 2024-11-01 RX ORDER — HEPARIN SODIUM 5000 [USP'U]/ML
5000 INJECTION, SOLUTION INTRAVENOUS; SUBCUTANEOUS EVERY 8 HOURS
Status: DISCONTINUED | OUTPATIENT
Start: 2024-11-01 | End: 2024-11-02

## 2024-11-01 RX ORDER — ACETAMINOPHEN 160 MG/5ML
650 SOLUTION ORAL EVERY 4 HOURS PRN
Status: DISCONTINUED | OUTPATIENT
Start: 2024-11-01 | End: 2024-11-03 | Stop reason: HOSPADM

## 2024-11-01 RX ORDER — AMLODIPINE BESYLATE 2.5 MG/1
2.5 TABLET ORAL DAILY
COMMUNITY

## 2024-11-01 RX ORDER — ACETAMINOPHEN 325 MG/1
650 TABLET ORAL EVERY 4 HOURS PRN
Status: DISCONTINUED | OUTPATIENT
Start: 2024-11-01 | End: 2024-11-03 | Stop reason: HOSPADM

## 2024-11-01 RX ORDER — ACETAMINOPHEN 650 MG/1
650 SUPPOSITORY RECTAL EVERY 4 HOURS PRN
Status: DISCONTINUED | OUTPATIENT
Start: 2024-11-01 | End: 2024-11-03 | Stop reason: HOSPADM

## 2024-11-01 RX ORDER — TALC
3 POWDER (GRAM) TOPICAL NIGHTLY PRN
Status: DISCONTINUED | OUTPATIENT
Start: 2024-11-01 | End: 2024-11-03 | Stop reason: HOSPADM

## 2024-11-01 RX ORDER — ALBUTEROL SULFATE 90 UG/1
2 INHALANT RESPIRATORY (INHALATION) EVERY 6 HOURS PRN
COMMUNITY
Start: 2021-10-01

## 2024-11-01 RX ORDER — FUROSEMIDE 10 MG/ML
20 INJECTION INTRAMUSCULAR; INTRAVENOUS DAILY
Status: DISCONTINUED | OUTPATIENT
Start: 2024-11-02 | End: 2024-11-03

## 2024-11-01 RX ORDER — FUROSEMIDE 10 MG/ML
40 INJECTION INTRAMUSCULAR; INTRAVENOUS ONCE
Status: COMPLETED | OUTPATIENT
Start: 2024-11-01 | End: 2024-11-01

## 2024-11-01 RX ORDER — ASCORBIC ACID 500 MG
500 TABLET ORAL DAILY
COMMUNITY

## 2024-11-01 RX ORDER — OLMESARTAN MEDOXOMIL 20 MG/1
20 TABLET ORAL DAILY
COMMUNITY

## 2024-11-01 RX ORDER — SPIRONOLACTONE 25 MG/1
12.5 TABLET ORAL DAILY
Status: DISCONTINUED | OUTPATIENT
Start: 2024-11-02 | End: 2024-11-03 | Stop reason: HOSPADM

## 2024-11-01 RX ADMIN — FUROSEMIDE 40 MG: 10 INJECTION, SOLUTION INTRAMUSCULAR; INTRAVENOUS at 17:40

## 2024-11-01 SDOH — ECONOMIC STABILITY: HOUSING INSECURITY: AT ANY TIME IN THE PAST 12 MONTHS, WERE YOU HOMELESS OR LIVING IN A SHELTER (INCLUDING NOW)?: NO

## 2024-11-01 SDOH — SOCIAL STABILITY: SOCIAL INSECURITY: WERE YOU ABLE TO COMPLETE ALL THE BEHAVIORAL HEALTH SCREENINGS?: YES

## 2024-11-01 SDOH — ECONOMIC STABILITY: TRANSPORTATION INSECURITY: IN THE PAST 12 MONTHS, HAS LACK OF TRANSPORTATION KEPT YOU FROM MEDICAL APPOINTMENTS OR FROM GETTING MEDICATIONS?: NO

## 2024-11-01 SDOH — SOCIAL STABILITY: SOCIAL INSECURITY: DO YOU FEEL UNSAFE GOING BACK TO THE PLACE WHERE YOU ARE LIVING?: NO

## 2024-11-01 SDOH — SOCIAL STABILITY: SOCIAL INSECURITY: HAVE YOU HAD ANY THOUGHTS OF HARMING ANYONE ELSE?: NO

## 2024-11-01 SDOH — ECONOMIC STABILITY: FOOD INSECURITY: WITHIN THE PAST 12 MONTHS, THE FOOD YOU BOUGHT JUST DIDN'T LAST AND YOU DIDN'T HAVE MONEY TO GET MORE.: NEVER TRUE

## 2024-11-01 SDOH — SOCIAL STABILITY: SOCIAL INSECURITY
WITHIN THE LAST YEAR, HAVE YOU BEEN KICKED, HIT, SLAPPED, OR OTHERWISE PHYSICALLY HURT BY YOUR PARTNER OR EX-PARTNER?: NO

## 2024-11-01 SDOH — ECONOMIC STABILITY: FOOD INSECURITY: WITHIN THE PAST 12 MONTHS, YOU WORRIED THAT YOUR FOOD WOULD RUN OUT BEFORE YOU GOT THE MONEY TO BUY MORE.: NEVER TRUE

## 2024-11-01 SDOH — SOCIAL STABILITY: SOCIAL INSECURITY: DOES ANYONE TRY TO KEEP YOU FROM HAVING/CONTACTING OTHER FRIENDS OR DOING THINGS OUTSIDE YOUR HOME?: NO

## 2024-11-01 SDOH — SOCIAL STABILITY: SOCIAL INSECURITY: HAVE YOU HAD THOUGHTS OF HARMING ANYONE ELSE?: NO

## 2024-11-01 SDOH — ECONOMIC STABILITY: HOUSING INSECURITY: IN THE LAST 12 MONTHS, WAS THERE A TIME WHEN YOU WERE NOT ABLE TO PAY THE MORTGAGE OR RENT ON TIME?: NO

## 2024-11-01 SDOH — ECONOMIC STABILITY: FOOD INSECURITY: HOW HARD IS IT FOR YOU TO PAY FOR THE VERY BASICS LIKE FOOD, HOUSING, MEDICAL CARE, AND HEATING?: NOT VERY HARD

## 2024-11-01 SDOH — SOCIAL STABILITY: SOCIAL INSECURITY: ARE YOU OR HAVE YOU BEEN THREATENED OR ABUSED PHYSICALLY, EMOTIONALLY, OR SEXUALLY BY ANYONE?: NO

## 2024-11-01 SDOH — SOCIAL STABILITY: SOCIAL INSECURITY: WITHIN THE LAST YEAR, HAVE YOU BEEN AFRAID OF YOUR PARTNER OR EX-PARTNER?: NO

## 2024-11-01 SDOH — SOCIAL STABILITY: SOCIAL INSECURITY: WITHIN THE LAST YEAR, HAVE YOU BEEN HUMILIATED OR EMOTIONALLY ABUSED IN OTHER WAYS BY YOUR PARTNER OR EX-PARTNER?: NO

## 2024-11-01 SDOH — SOCIAL STABILITY: SOCIAL INSECURITY
WITHIN THE LAST YEAR, HAVE YOU BEEN RAPED OR FORCED TO HAVE ANY KIND OF SEXUAL ACTIVITY BY YOUR PARTNER OR EX-PARTNER?: NO

## 2024-11-01 SDOH — SOCIAL STABILITY: SOCIAL INSECURITY: HAS ANYONE EVER THREATENED TO HURT YOUR FAMILY OR YOUR PETS?: NO

## 2024-11-01 SDOH — SOCIAL STABILITY: SOCIAL INSECURITY: DO YOU FEEL ANYONE HAS EXPLOITED OR TAKEN ADVANTAGE OF YOU FINANCIALLY OR OF YOUR PERSONAL PROPERTY?: NO

## 2024-11-01 SDOH — SOCIAL STABILITY: SOCIAL INSECURITY: ABUSE: ADULT

## 2024-11-01 SDOH — ECONOMIC STABILITY: INCOME INSECURITY: IN THE PAST 12 MONTHS HAS THE ELECTRIC, GAS, OIL, OR WATER COMPANY THREATENED TO SHUT OFF SERVICES IN YOUR HOME?: NO

## 2024-11-01 SDOH — SOCIAL STABILITY: SOCIAL INSECURITY: ARE THERE ANY APPARENT SIGNS OF INJURIES/BEHAVIORS THAT COULD BE RELATED TO ABUSE/NEGLECT?: NO

## 2024-11-01 SDOH — ECONOMIC STABILITY: HOUSING INSECURITY: IN THE PAST 12 MONTHS, HOW MANY TIMES HAVE YOU MOVED WHERE YOU WERE LIVING?: 0

## 2024-11-01 ASSESSMENT — COGNITIVE AND FUNCTIONAL STATUS - GENERAL
PATIENT BASELINE BEDBOUND: NO
DAILY ACTIVITIY SCORE: 24
CLIMB 3 TO 5 STEPS WITH RAILING: A LITTLE
MOBILITY SCORE: 23

## 2024-11-01 ASSESSMENT — LIFESTYLE VARIABLES
HOW OFTEN DURING THE LAST YEAR HAVE YOU HAD A FEELING OF GUILT OR REMORSE AFTER DRINKING: NEVER
HOW OFTEN DURING THE LAST YEAR HAVE YOU FAILED TO DO WHAT WAS NORMALLY EXPECTED FROM YOU BECAUSE OF DRINKING: NEVER
HAS A RELATIVE, FRIEND, DOCTOR, OR ANOTHER HEALTH PROFESSIONAL EXPRESSED CONCERN ABOUT YOUR DRINKING OR SUGGESTED YOU CUT DOWN: NO
HOW OFTEN DURING THE LAST YEAR HAVE YOU NEEDED AN ALCOHOLIC DRINK FIRST THING IN THE MORNING TO GET YOURSELF GOING AFTER A NIGHT OF HEAVY DRINKING: NEVER
HOW MANY STANDARD DRINKS CONTAINING ALCOHOL DO YOU HAVE ON A TYPICAL DAY: 1 OR 2
HOW OFTEN DO YOU HAVE A DRINK CONTAINING ALCOHOL: 2-3 TIMES A WEEK
SKIP TO QUESTIONS 9-10: 1
HOW OFTEN DURING THE LAST YEAR HAVE YOU BEEN UNABLE TO REMEMBER WHAT HAPPENED THE NIGHT BEFORE BECAUSE YOU HAD BEEN DRINKING: NEVER
HAVE YOU OR SOMEONE ELSE BEEN INJURED AS A RESULT OF YOUR DRINKING: NO
AUDIT TOTAL SCORE: 3
HOW OFTEN DO YOU HAVE 6 OR MORE DRINKS ON ONE OCCASION: NEVER
HOW OFTEN DURING THE LAST YEAR HAVE YOU FOUND THAT YOU WERE NOT ABLE TO STOP DRINKING ONCE YOU HAD STARTED: NEVER
AUDIT-C TOTAL SCORE: 3
AUDIT-C TOTAL SCORE: 3
AUDIT TOTAL SCORE: 0

## 2024-11-01 ASSESSMENT — ACTIVITIES OF DAILY LIVING (ADL)
HEARING - RIGHT EAR: DIFFICULTY WITH NOISE
JUDGMENT_ADEQUATE_SAFELY_COMPLETE_DAILY_ACTIVITIES: YES
WALKS IN HOME: INDEPENDENT
LACK_OF_TRANSPORTATION: NO
BATHING: INDEPENDENT
TOILETING: INDEPENDENT
HEARING - LEFT EAR: DIFFICULTY WITH NOISE
GROOMING: INDEPENDENT
LACK_OF_TRANSPORTATION: NO
FEEDING YOURSELF: INDEPENDENT
PATIENT'S MEMORY ADEQUATE TO SAFELY COMPLETE DAILY ACTIVITIES?: YES
ADEQUATE_TO_COMPLETE_ADL: YES
DRESSING YOURSELF: INDEPENDENT

## 2024-11-01 ASSESSMENT — CHA2DS2 SCORE
AGE IN YEARS: 75+
DIABETES: NO
VASCULAR DISEASE: NO
SEX: MALE
HYPERTENSION: YES
CHF OR LEFT VENTRICULAR DYSFUNCTION: YES
PRIOR STROKE OR TIA OR THROMBOEMBOLISM: NO
CHA2D2S VASC SCORE: 4

## 2024-11-01 ASSESSMENT — COLUMBIA-SUICIDE SEVERITY RATING SCALE - C-SSRS
6. HAVE YOU EVER DONE ANYTHING, STARTED TO DO ANYTHING, OR PREPARED TO DO ANYTHING TO END YOUR LIFE?: NO
1. IN THE PAST MONTH, HAVE YOU WISHED YOU WERE DEAD OR WISHED YOU COULD GO TO SLEEP AND NOT WAKE UP?: NO
2. HAVE YOU ACTUALLY HAD ANY THOUGHTS OF KILLING YOURSELF?: NO

## 2024-11-01 ASSESSMENT — PATIENT HEALTH QUESTIONNAIRE - PHQ9
1. LITTLE INTEREST OR PLEASURE IN DOING THINGS: NOT AT ALL
2. FEELING DOWN, DEPRESSED OR HOPELESS: NOT AT ALL
SUM OF ALL RESPONSES TO PHQ9 QUESTIONS 1 & 2: 0

## 2024-11-01 ASSESSMENT — PAIN SCALES - WONG BAKER: WONGBAKER_NUMERICALRESPONSE: NO HURT

## 2024-11-01 ASSESSMENT — PAIN SCALES - GENERAL: PAINLEVEL_OUTOF10: 0 - NO PAIN

## 2024-11-02 ENCOUNTER — APPOINTMENT (OUTPATIENT)
Dept: CARDIOLOGY | Facility: HOSPITAL | Age: 89
End: 2024-11-02
Payer: MEDICARE

## 2024-11-02 ENCOUNTER — APPOINTMENT (OUTPATIENT)
Dept: RADIOLOGY | Facility: HOSPITAL | Age: 89
DRG: 280 | End: 2024-11-02
Payer: MEDICARE

## 2024-11-02 LAB
ANION GAP SERPL CALC-SCNC: 14 MMOL/L (ref 10–20)
AORTIC VALVE MEAN GRADIENT: 9 MMHG
AORTIC VALVE PEAK VELOCITY: 2.08 M/S
AV PEAK GRADIENT: 17 MMHG
AVA (PEAK VEL): 0.98 CM2
AVA (VTI): 0.79 CM2
BUN SERPL-MCNC: 30 MG/DL (ref 6–23)
CALCIUM SERPL-MCNC: 9.1 MG/DL (ref 8.6–10.3)
CHLORIDE SERPL-SCNC: 107 MMOL/L (ref 98–107)
CO2 SERPL-SCNC: 26 MMOL/L (ref 21–32)
CREAT SERPL-MCNC: 1.14 MG/DL (ref 0.5–1.3)
EGFRCR SERPLBLD CKD-EPI 2021: 61 ML/MIN/1.73M*2
EJECTION FRACTION APICAL 4 CHAMBER: 25
EJECTION FRACTION: 35 %
ERYTHROCYTE [DISTWIDTH] IN BLOOD BY AUTOMATED COUNT: 14.8 % (ref 11.5–14.5)
GLUCOSE SERPL-MCNC: 96 MG/DL (ref 74–99)
HCT VFR BLD AUTO: 36.1 % (ref 41–52)
HGB BLD-MCNC: 12.2 G/DL (ref 13.5–17.5)
LEFT VENTRICLE INTERNAL DIMENSION DIASTOLE: 4.62 CM (ref 3.5–6)
LEFT VENTRICULAR OUTFLOW TRACT DIAMETER: 1.8 CM
MCH RBC QN AUTO: 32.7 PG (ref 26–34)
MCHC RBC AUTO-ENTMCNC: 33.8 G/DL (ref 32–36)
MCV RBC AUTO: 97 FL (ref 80–100)
NRBC BLD-RTO: 0 /100 WBCS (ref 0–0)
PLATELET # BLD AUTO: 104 X10*3/UL (ref 150–450)
POTASSIUM SERPL-SCNC: 4.7 MMOL/L (ref 3.5–5.3)
RBC # BLD AUTO: 3.73 X10*6/UL (ref 4.5–5.9)
RIGHT VENTRICLE FREE WALL PEAK S': 10.3 CM/S
SODIUM SERPL-SCNC: 142 MMOL/L (ref 136–145)
TRICUSPID ANNULAR PLANE SYSTOLIC EXCURSION: 0.8 CM
WBC # BLD AUTO: 4.5 X10*3/UL (ref 4.4–11.3)

## 2024-11-02 PROCEDURE — 2500000001 HC RX 250 WO HCPCS SELF ADMINISTERED DRUGS (ALT 637 FOR MEDICARE OP): Performed by: PHYSICIAN ASSISTANT

## 2024-11-02 PROCEDURE — 1200000002 HC GENERAL ROOM WITH TELEMETRY DAILY

## 2024-11-02 PROCEDURE — 94640 AIRWAY INHALATION TREATMENT: CPT

## 2024-11-02 PROCEDURE — 2500000001 HC RX 250 WO HCPCS SELF ADMINISTERED DRUGS (ALT 637 FOR MEDICARE OP): Performed by: INTERNAL MEDICINE

## 2024-11-02 PROCEDURE — 93306 TTE W/DOPPLER COMPLETE: CPT | Performed by: INTERNAL MEDICINE

## 2024-11-02 PROCEDURE — 2500000001 HC RX 250 WO HCPCS SELF ADMINISTERED DRUGS (ALT 637 FOR MEDICARE OP)

## 2024-11-02 PROCEDURE — 71046 X-RAY EXAM CHEST 2 VIEWS: CPT | Performed by: RADIOLOGY

## 2024-11-02 PROCEDURE — 85027 COMPLETE CBC AUTOMATED: CPT

## 2024-11-02 PROCEDURE — 99223 1ST HOSP IP/OBS HIGH 75: CPT | Performed by: INTERNAL MEDICINE

## 2024-11-02 PROCEDURE — 80048 BASIC METABOLIC PNL TOTAL CA: CPT

## 2024-11-02 PROCEDURE — 71046 X-RAY EXAM CHEST 2 VIEWS: CPT

## 2024-11-02 PROCEDURE — 36415 COLL VENOUS BLD VENIPUNCTURE: CPT

## 2024-11-02 PROCEDURE — 93306 TTE W/DOPPLER COMPLETE: CPT

## 2024-11-02 PROCEDURE — 2500000004 HC RX 250 GENERAL PHARMACY W/ HCPCS (ALT 636 FOR OP/ED)

## 2024-11-02 RX ORDER — TAMSULOSIN HYDROCHLORIDE 0.4 MG/1
0.4 CAPSULE ORAL DAILY
Status: DISCONTINUED | OUTPATIENT
Start: 2024-11-02 | End: 2024-11-03 | Stop reason: HOSPADM

## 2024-11-02 RX ADMIN — HEPARIN SODIUM 5000 UNITS: 5000 INJECTION INTRAVENOUS; SUBCUTANEOUS at 05:14

## 2024-11-02 RX ADMIN — BENZOCAINE AND MENTHOL 1 LOZENGE: 15; 3.6 LOZENGE ORAL at 10:33

## 2024-11-02 RX ADMIN — APIXABAN 5 MG: 5 TABLET, FILM COATED ORAL at 20:54

## 2024-11-02 RX ADMIN — METOPROLOL SUCCINATE 12.5 MG: 25 TABLET, EXTENDED RELEASE ORAL at 08:40

## 2024-11-02 RX ADMIN — ASPIRIN 81 MG: 81 TABLET, COATED ORAL at 08:40

## 2024-11-02 RX ADMIN — CLOPIDOGREL BISULFATE 75 MG: 75 TABLET ORAL at 08:40

## 2024-11-02 RX ADMIN — BENZOCAINE AND MENTHOL 1 LOZENGE: 15; 3.6 LOZENGE ORAL at 12:35

## 2024-11-02 RX ADMIN — FUROSEMIDE 20 MG: 10 INJECTION, SOLUTION INTRAMUSCULAR; INTRAVENOUS at 08:40

## 2024-11-02 RX ADMIN — TIOTROPIUM BROMIDE INHALATION SPRAY 2 PUFF: 3.12 SPRAY, METERED RESPIRATORY (INHALATION) at 07:32

## 2024-11-02 RX ADMIN — HEPARIN SODIUM 5000 UNITS: 5000 INJECTION INTRAVENOUS; SUBCUTANEOUS at 12:35

## 2024-11-02 RX ADMIN — ATORVASTATIN CALCIUM 20 MG: 20 TABLET, FILM COATED ORAL at 08:40

## 2024-11-02 RX ADMIN — FLUTICASONE FUROATE AND VILANTEROL TRIFENATATE 1 PUFF: 200; 25 POWDER RESPIRATORY (INHALATION) at 07:33

## 2024-11-02 ASSESSMENT — COGNITIVE AND FUNCTIONAL STATUS - GENERAL
MOBILITY SCORE: 23
CLIMB 3 TO 5 STEPS WITH RAILING: A LITTLE
DAILY ACTIVITIY SCORE: 24

## 2024-11-02 ASSESSMENT — PAIN SCALES - WONG BAKER: WONGBAKER_NUMERICALRESPONSE: NO HURT

## 2024-11-02 ASSESSMENT — PAIN SCALES - GENERAL
PAINLEVEL_OUTOF10: 0 - NO PAIN
PAINLEVEL_OUTOF10: 0 - NO PAIN

## 2024-11-03 VITALS
BODY MASS INDEX: 23.15 KG/M2 | RESPIRATION RATE: 18 BRPM | SYSTOLIC BLOOD PRESSURE: 115 MMHG | DIASTOLIC BLOOD PRESSURE: 65 MMHG | OXYGEN SATURATION: 94 % | HEART RATE: 74 BPM | TEMPERATURE: 98.2 F | WEIGHT: 147.49 LBS | HEIGHT: 67 IN

## 2024-11-03 LAB
ALBUMIN SERPL BCP-MCNC: 3.6 G/DL (ref 3.4–5)
ALP SERPL-CCNC: 82 U/L (ref 33–136)
ALT SERPL W P-5'-P-CCNC: 21 U/L (ref 10–52)
ANION GAP SERPL CALC-SCNC: 14 MMOL/L (ref 10–20)
AST SERPL W P-5'-P-CCNC: 29 U/L (ref 9–39)
BASOPHILS # BLD AUTO: 0.02 X10*3/UL (ref 0–0.1)
BASOPHILS NFR BLD AUTO: 0.5 %
BILIRUB SERPL-MCNC: 2.1 MG/DL (ref 0–1.2)
BUN SERPL-MCNC: 31 MG/DL (ref 6–23)
CALCIUM SERPL-MCNC: 9 MG/DL (ref 8.6–10.3)
CHLORIDE SERPL-SCNC: 104 MMOL/L (ref 98–107)
CO2 SERPL-SCNC: 26 MMOL/L (ref 21–32)
CREAT SERPL-MCNC: 1.18 MG/DL (ref 0.5–1.3)
EGFRCR SERPLBLD CKD-EPI 2021: 59 ML/MIN/1.73M*2
EOSINOPHIL # BLD AUTO: 0.14 X10*3/UL (ref 0–0.4)
EOSINOPHIL NFR BLD AUTO: 3.6 %
ERYTHROCYTE [DISTWIDTH] IN BLOOD BY AUTOMATED COUNT: 14.6 % (ref 11.5–14.5)
GLUCOSE SERPL-MCNC: 90 MG/DL (ref 74–99)
HCT VFR BLD AUTO: 35.6 % (ref 41–52)
HGB BLD-MCNC: 12.1 G/DL (ref 13.5–17.5)
IMM GRANULOCYTES # BLD AUTO: 0.01 X10*3/UL (ref 0–0.5)
IMM GRANULOCYTES NFR BLD AUTO: 0.3 % (ref 0–0.9)
LYMPHOCYTES # BLD AUTO: 0.92 X10*3/UL (ref 0.8–3)
LYMPHOCYTES NFR BLD AUTO: 23.6 %
MCH RBC QN AUTO: 32.4 PG (ref 26–34)
MCHC RBC AUTO-ENTMCNC: 34 G/DL (ref 32–36)
MCV RBC AUTO: 95 FL (ref 80–100)
MONOCYTES # BLD AUTO: 0.44 X10*3/UL (ref 0.05–0.8)
MONOCYTES NFR BLD AUTO: 11.3 %
NEUTROPHILS # BLD AUTO: 2.37 X10*3/UL (ref 1.6–5.5)
NEUTROPHILS NFR BLD AUTO: 60.7 %
NRBC BLD-RTO: 0 /100 WBCS (ref 0–0)
PLATELET # BLD AUTO: 105 X10*3/UL (ref 150–450)
POTASSIUM SERPL-SCNC: 4 MMOL/L (ref 3.5–5.3)
PROT SERPL-MCNC: 5.8 G/DL (ref 6.4–8.2)
RBC # BLD AUTO: 3.73 X10*6/UL (ref 4.5–5.9)
SODIUM SERPL-SCNC: 140 MMOL/L (ref 136–145)
WBC # BLD AUTO: 3.9 X10*3/UL (ref 4.4–11.3)

## 2024-11-03 PROCEDURE — 2500000001 HC RX 250 WO HCPCS SELF ADMINISTERED DRUGS (ALT 637 FOR MEDICARE OP)

## 2024-11-03 PROCEDURE — 36415 COLL VENOUS BLD VENIPUNCTURE: CPT | Performed by: INTERNAL MEDICINE

## 2024-11-03 PROCEDURE — 80053 COMPREHEN METABOLIC PANEL: CPT | Performed by: INTERNAL MEDICINE

## 2024-11-03 PROCEDURE — 2500000001 HC RX 250 WO HCPCS SELF ADMINISTERED DRUGS (ALT 637 FOR MEDICARE OP): Performed by: PHYSICIAN ASSISTANT

## 2024-11-03 PROCEDURE — 85025 COMPLETE CBC W/AUTO DIFF WBC: CPT | Performed by: INTERNAL MEDICINE

## 2024-11-03 PROCEDURE — 2500000002 HC RX 250 W HCPCS SELF ADMINISTERED DRUGS (ALT 637 FOR MEDICARE OP, ALT 636 FOR OP/ED): Performed by: INTERNAL MEDICINE

## 2024-11-03 PROCEDURE — 2500000004 HC RX 250 GENERAL PHARMACY W/ HCPCS (ALT 636 FOR OP/ED)

## 2024-11-03 PROCEDURE — 94640 AIRWAY INHALATION TREATMENT: CPT

## 2024-11-03 PROCEDURE — 2500000001 HC RX 250 WO HCPCS SELF ADMINISTERED DRUGS (ALT 637 FOR MEDICARE OP): Performed by: INTERNAL MEDICINE

## 2024-11-03 PROCEDURE — 99233 SBSQ HOSP IP/OBS HIGH 50: CPT | Performed by: INTERNAL MEDICINE

## 2024-11-03 RX ORDER — FUROSEMIDE 20 MG/1
20 TABLET ORAL DAILY
Qty: 30 TABLET | Refills: 0 | Status: SHIPPED | OUTPATIENT
Start: 2024-11-04 | End: 2024-12-04

## 2024-11-03 RX ORDER — FUROSEMIDE 20 MG/1
20 TABLET ORAL DAILY
Status: DISCONTINUED | OUTPATIENT
Start: 2024-11-03 | End: 2024-11-03 | Stop reason: HOSPADM

## 2024-11-03 RX ORDER — TAMSULOSIN HYDROCHLORIDE 0.4 MG/1
0.4 CAPSULE ORAL DAILY
Qty: 30 CAPSULE | Refills: 0 | Status: SHIPPED | OUTPATIENT
Start: 2024-11-04 | End: 2024-12-04

## 2024-11-03 RX ORDER — GUAIFENESIN 100 MG/5ML
400 SOLUTION ORAL EVERY 6 HOURS PRN
Status: DISCONTINUED | OUTPATIENT
Start: 2024-11-03 | End: 2024-11-03 | Stop reason: HOSPADM

## 2024-11-03 RX ADMIN — BENZOCAINE AND MENTHOL 1 LOZENGE: 15; 3.6 LOZENGE ORAL at 08:41

## 2024-11-03 RX ADMIN — METOPROLOL SUCCINATE 12.5 MG: 25 TABLET, EXTENDED RELEASE ORAL at 08:40

## 2024-11-03 RX ADMIN — VALSARTAN 160 MG: 80 TABLET, FILM COATED ORAL at 08:40

## 2024-11-03 RX ADMIN — ASPIRIN 81 MG: 81 TABLET, COATED ORAL at 08:40

## 2024-11-03 RX ADMIN — ATORVASTATIN CALCIUM 20 MG: 20 TABLET, FILM COATED ORAL at 08:40

## 2024-11-03 RX ADMIN — APIXABAN 5 MG: 5 TABLET, FILM COATED ORAL at 08:40

## 2024-11-03 RX ADMIN — FUROSEMIDE 20 MG: 20 TABLET ORAL at 12:37

## 2024-11-03 RX ADMIN — FLUTICASONE FUROATE AND VILANTEROL TRIFENATATE 1 PUFF: 200; 25 POWDER RESPIRATORY (INHALATION) at 06:47

## 2024-11-03 RX ADMIN — FUROSEMIDE 20 MG: 10 INJECTION, SOLUTION INTRAMUSCULAR; INTRAVENOUS at 08:41

## 2024-11-03 RX ADMIN — TIOTROPIUM BROMIDE INHALATION SPRAY 2 PUFF: 3.12 SPRAY, METERED RESPIRATORY (INHALATION) at 06:42

## 2024-11-03 RX ADMIN — GUAIFENESIN 400 MG: 100 SOLUTION ORAL at 10:21

## 2024-11-03 ASSESSMENT — PAIN SCALES - GENERAL: PAINLEVEL_OUTOF10: 0 - NO PAIN

## 2024-11-04 LAB
ATRIAL RATE: 55 BPM
Q ONSET: 211 MS
Q ONSET: 237 MS
QRS COUNT: 13 BEATS
QRS COUNT: 14 BEATS
QRS DURATION: 158 MS
QRS DURATION: 160 MS
QT INTERVAL: 428 MS
QT INTERVAL: 430 MS
QTC CALCULATION(BAZETT): 495 MS
QTC CALCULATION(BAZETT): 515 MS
QTC FREDERICIA: 473 MS
QTC FREDERICIA: 484 MS
R AXIS: -38 DEGREES
R AXIS: -53 DEGREES
T AXIS: 107 DEGREES
T AXIS: 113 DEGREES
T OFFSET: 425 MS
T OFFSET: 452 MS
VENTRICULAR RATE: 80 BPM
VENTRICULAR RATE: 87 BPM

## 2024-11-05 ENCOUNTER — APPOINTMENT (OUTPATIENT)
Dept: RADIOLOGY | Facility: CLINIC | Age: 89
End: 2024-11-05
Payer: MEDICARE

## 2024-11-06 ENCOUNTER — TELEPHONE (OUTPATIENT)
Dept: CARDIOLOGY | Facility: CLINIC | Age: 89
End: 2024-11-06
Payer: MEDICARE

## 2024-11-06 NOTE — TELEPHONE ENCOUNTER
Central Scheduling called with Patrick on the other line to get a Cardioversion scheduled 4 weeks out.     Please call patient at [phone]

## 2024-11-18 PROBLEM — E78.5 HYPERLIPOPROTEINEMIA: Status: RESOLVED | Noted: 2024-02-20 | Resolved: 2024-11-18

## 2024-11-18 PROBLEM — I50.9 CONGESTIVE HEART FAILURE: Status: RESOLVED | Noted: 2024-03-01 | Resolved: 2024-11-18

## 2024-11-20 ENCOUNTER — APPOINTMENT (OUTPATIENT)
Dept: CARDIOLOGY | Facility: CLINIC | Age: 89
End: 2024-11-20
Payer: MEDICARE

## 2024-12-01 DIAGNOSIS — I10 ESSENTIAL (PRIMARY) HYPERTENSION: ICD-10-CM

## 2024-12-02 RX ORDER — METOPROLOL SUCCINATE 25 MG/1
12.5 TABLET, EXTENDED RELEASE ORAL DAILY
Qty: 45 TABLET | Refills: 1 | Status: SHIPPED | OUTPATIENT
Start: 2024-12-02

## 2024-12-11 ENCOUNTER — OFFICE VISIT (OUTPATIENT)
Dept: CARDIOLOGY | Facility: CLINIC | Age: 89
End: 2024-12-11
Payer: MEDICARE

## 2024-12-11 VITALS
SYSTOLIC BLOOD PRESSURE: 108 MMHG | WEIGHT: 150 LBS | RESPIRATION RATE: 16 BRPM | DIASTOLIC BLOOD PRESSURE: 70 MMHG | OXYGEN SATURATION: 96 % | BODY MASS INDEX: 23.49 KG/M2 | HEART RATE: 77 BPM

## 2024-12-11 DIAGNOSIS — I50.43 ACUTE ON CHRONIC COMBINED SYSTOLIC AND DIASTOLIC CONGESTIVE HEART FAILURE: ICD-10-CM

## 2024-12-11 DIAGNOSIS — I50.33 CHF (CONGESTIVE HEART FAILURE), NYHA CLASS II, ACUTE ON CHRONIC, DIASTOLIC: ICD-10-CM

## 2024-12-11 PROCEDURE — 1159F MED LIST DOCD IN RCRD: CPT | Performed by: STUDENT IN AN ORGANIZED HEALTH CARE EDUCATION/TRAINING PROGRAM

## 2024-12-11 PROCEDURE — 3078F DIAST BP <80 MM HG: CPT | Performed by: STUDENT IN AN ORGANIZED HEALTH CARE EDUCATION/TRAINING PROGRAM

## 2024-12-11 PROCEDURE — 99215 OFFICE O/P EST HI 40 MIN: CPT | Performed by: STUDENT IN AN ORGANIZED HEALTH CARE EDUCATION/TRAINING PROGRAM

## 2024-12-11 PROCEDURE — 3074F SYST BP LT 130 MM HG: CPT | Performed by: STUDENT IN AN ORGANIZED HEALTH CARE EDUCATION/TRAINING PROGRAM

## 2024-12-11 PROCEDURE — 1157F ADVNC CARE PLAN IN RCRD: CPT | Performed by: STUDENT IN AN ORGANIZED HEALTH CARE EDUCATION/TRAINING PROGRAM

## 2024-12-11 RX ORDER — FUROSEMIDE 20 MG/1
20 TABLET ORAL EVERY OTHER DAY
Qty: 45 TABLET | Refills: 1 | Status: SHIPPED | OUTPATIENT
Start: 2024-12-11 | End: 2025-12-11

## 2024-12-11 NOTE — PROGRESS NOTES
Encounter Date: 3/6/2024     Signed       Expand All Collapse All    Signed  Encounter Date: 9/20/2023  Nelson Shin MD PhD         Diagnoses/Problems  Assessed    · Coronary artery disease (414.00) (I25.10)   · HTN (hypertension) (401.9) (I10)     Orders  Coronary artery disease, HTN (hypertension)    · Basic Metabolic Panel; Status:Active - Retrospective By Protocol Authorization;  Requested for:[id number];    · Magnesium, Serum; Status:Active - Retrospective By Protocol Authorization; Requested  for:[id number];      Chief Complaint        RAMSES VALENTE is being seen for a 6 month follow-up of. Echo, labs results      History of Present Illness  May 23, 2022  Patient is an 87-year-old male who presented with non-ST elevation MI to [facility] in May 2022. He was found to have ejection fraction of 40 to 45% with severe mitral regurgitation. He underwent right and left heart catheterization and he was found to have significant lesion in proximal to mid LAD which was treated successfully with a 2.75 x 38 mm Synergy drug-eluting stent postdilated with a 3.0 NC balloon. He underwent transesophageal echocardiogram that confirmed the presence of severe mitral regurgitation. He was sent to be evaluated at [facility] the structural team for consideration of MitraClip. He had the initial visit done last week. Awaiting to hear back.  Currently patient has shortness of breath with activity. Denies having chest pain. Currently takes torsemide 20 mg daily his EKG today shows sinus rhythm with a rate of 59 and left bundle branch block pattern     August 18, 2022  Patient had echocardiogram done yesterday that showed ejection fraction of 35 to 40% and a cardiac MRI was suggested for further evaluation of the valves after the MitraClip placement.  Patient himself today reports having no shortness of breath or palpitation no chest pain. Reports overall he is doing well.     November 16, 2022  Patient has received his  MitraClip. MRI post that showed mild residual mitral regurgitation with ejection fraction of 35%.  Denies having chest pain or shortness of breath.     March 8, 2023  Patient denies having chest pain or shortness of breath or palpitation.  He had his echocardiogram done on March 1, 2023 that showed ejection fraction of 45 to 50%, status post MitraClip with no significant residual mitral regurgitation. He also has moderate aortic valve stenosis.  His weight has been stable since last time.     September 20, 2023  Patient had his echocardiogram done today that shows mildly reduced ejection fraction of 45 to 50%. There is residual mild MR. He has mild aortic valve stenosis.  He denies having chest pain or shortness of breath or palpitation and reports he has been walking regularly and hiking.  His labs from August 2023 showed creatinine of 1.3.               Follow up visit     03/06/24     Patrick Vázquez is a 88 y.o. male who is here for follow-up after about 6 months.  His main complaint is sometimes he had dizziness and had a fall.  Primary care physician stopped Lasix.  He feels better since then.        Patient denies any chest pain, shortness of breath, palpitation.                   11/3/2024  1.  HFrEF.  Baseline cardiomyopathy ejection fraction 45% now down to 35% likely related to atrial fibrillation.  Continue volume removal.  Transition to oral Lasix.  He is feeling much better.  Optimize GDMT as tolerated.  Continue metoprolol succinate, angiotensin receptor blockers.  SGLT2 inhibitor should be started as an outpatient.  I restarted the spironolactone today  2.  Atrial fibrillation.  New onset with a chads vascular score 5.  Initiate Eliquis as we did yesterday.  Outpatient follow-up with Dr. Shin for cardioversion after 3 to 4 weeks.  3.  MitraClip.  Mild residual mitral regurgitation  4.  CAD.  Stable.  I stopped Plavix and added aspirin  5.  Aortic stenosis mild to moderate.     Okay for discharge  from a cardiac standpoint.  Follow-up with Dr. Shin 2 to 4 weeks to plan for outpatient cardioversion in 4 weeks.     Frank Walker MD                     Electronically signed by Frank Walker MD at 11/3/2024 11:20 AM    Follow up visit    12/11/24    Patrick Vázquez is a 89 y.o. male who is here after hospital admission over a month ago.  Currently denies having chest pain or shortness of breath or palpitation.  Last dose of his Lasix was about 1 week ago.  No recent labs after discharge.        Past Medical History  Past Medical History:   Diagnosis Date    Hypertension     Personal history of malignant neoplasm of prostate     History of malignant neoplasm of prostate    Personal history of other malignant neoplasm of skin     History of malignant neoplasm of skin        Past Surgical History  Past Surgical History:   Procedure Laterality Date    MR HEAD ANGIO WO IV CONTRAST  2/22/2023    MR HEAD ANGIO WO IV CONTRAST PAR MRI    MR NECK ANGIO WO IV CONTRAST  2/22/2023    MR NECK ANGIO WO IV CONTRAST PAR MRI    OTHER SURGICAL HISTORY  05/11/2022    Cyst excision    OTHER SURGICAL HISTORY  05/11/2022    Coronary artery stent placement    OTHER SURGICAL HISTORY  05/17/2022    Radical prostatectomy    OTHER SURGICAL HISTORY  05/17/2022    Cardiac catheterization    OTHER SURGICAL HISTORY  05/17/2022    Hip replacement    OTHER SURGICAL HISTORY  05/17/2022    Hip replacement        Social history  Social History     Socioeconomic History    Marital status:      Spouse name: Not on file    Number of children: Not on file    Years of education: Not on file    Highest education level: Not on file   Occupational History    Not on file   Tobacco Use    Smoking status: Former     Types: Cigarettes    Smokeless tobacco: Never   Vaping Use    Vaping status: Never Used   Substance and Sexual Activity    Alcohol use: Yes     Comment: SOCIALLY    Drug use: Never    Sexual activity: Not on file   Other Topics Concern    Not  on file   Social History Narrative    Not on file     Social Drivers of Health     Financial Resource Strain: Low Risk  (11/1/2024)    Overall Financial Resource Strain (CARDIA)     Difficulty of Paying Living Expenses: Not very hard   Food Insecurity: No Food Insecurity (11/1/2024)    Hunger Vital Sign     Worried About Running Out of Food in the Last Year: Never true     Ran Out of Food in the Last Year: Never true   Transportation Needs: No Transportation Needs (11/1/2024)    PRAPARE - Transportation     Lack of Transportation (Medical): No     Lack of Transportation (Non-Medical): No   Physical Activity: Not on file   Stress: Not on file   Social Connections: Not on file   Intimate Partner Violence: Not At Risk (11/1/2024)    Humiliation, Afraid, Rape, and Kick questionnaire     Fear of Current or Ex-Partner: No     Emotionally Abused: No     Physically Abused: No     Sexually Abused: No   Housing Stability: Low Risk  (11/1/2024)    Housing Stability Vital Sign     Unable to Pay for Housing in the Last Year: No     Number of Times Moved in the Last Year: 0     Homeless in the Last Year: No        Family History  Family History   Problem Relation Name Age of Onset    Breast cancer Daughter      Breast cancer Maternal Grandmother          12 system point of review is negative except for what described in history of present illness    Allergies:  Allergies   Allergen Reactions    Bee Venom Protein (Honey Bee) Hives and Itching     carries an epi pen        Outpatient Medications:  Current Outpatient Medications   Medication Instructions    acetaminophen (TYLENOL) 650 mg, oral, Every 6 hours PRN    albuterol 90 mcg/actuation inhaler 2 puffs, Every 6 hours PRN    amLODIPine (NORVASC) 2.5 mg, Daily    apixaban (ELIQUIS) 5 mg, oral, Every 12 hours    ascorbic acid (VITAMIN C) 500 mg, Daily    aspirin 81 mg, Daily    atorvastatin (LIPITOR) 20 mg, oral, Daily    Breztri Aerosphere 160-9-4.8 mcg/actuation HFA aerosol  inhaler 2 puffs, 2 times daily    cyclobenzaprine (FLEXERIL) 5 mg, oral, 3 times daily    furosemide (LASIX) 20 mg, oral, Daily    ibuprofen 600 mg, oral, Every 8 hours PRN    metoprolol succinate XL (TOPROL-XL) 12.5 mg, oral, Daily    olmesartan (BENICAR) 20 mg, Daily    spironolactone (ALDACTONE) 12.5 mg, oral, Daily         Last Recorded Vitals:      11/2/2024     8:03 AM 11/2/2024     4:23 PM 11/2/2024     8:19 PM 11/3/2024     4:16 AM 11/3/2024     6:47 AM 11/3/2024     8:20 AM 12/11/2024     1:36 PM   Vitals   Systolic 125 119 105 104  115 108   Diastolic 79 78 61 63  65 70   BP Location   Right arm Right arm  Right arm    Heart Rate 73 78 61 71  74 77   Temp 36.4 °C (97.5 °F) 36.3 °C (97.3 °F) 36.4 °C (97.5 °F) 36.6 °C (97.9 °F)  36.8 °C (98.2 °F)    Resp   18 18  18 16   Weight (lb)     147.49  150   BMI     23.1 kg/m2  23.49 kg/m2   BSA (m2)     1.78 m2  1.79 m2   Visit Report       Report    Visit Vitals  /70   Pulse 77   Resp 16   Wt 68 kg (150 lb)   SpO2 96%   BMI 23.49 kg/m²   Smoking Status Former   BSA 1.79 m²        Physical Exam:    General: Awake, alert/oriented x3, well developed, no acute distress  Head: Atraumatic/Normocephalic  Eyes: Normal external exam, EOMI, PERRLA  ENT: Oropharynx normal, moist mucous membranes  Cardiovascular: RRR, S1/S2, no murmurs, rubs, or gallops, radial pulses +2, no edema of extremities  Pulmonary: CTAB, no respiratory distress. No wheezes, rales, or ronchi  Abdomen: +BS, soft, non-tender, nondistended, no guarding or rebound  MSK: No joint swelling, normal movements of all extremities. Range of motion- normal.  Extremities: no edema, no cyanosis  Neuro: Alert/oriented x3, no focal motor or sensory deficits  Psychiatric: Judgment intact. Appropriate mood and behavior      I reviewed recent available cardiac studies.      Labs    Lab Results   Component Value Date    WBC 3.9 (L) 11/03/2024    HGB 12.1 (L) 11/03/2024    HCT 35.6 (L) 11/03/2024     (L)  "11/03/2024    CHOL 108 08/29/2023    TRIG 51 08/29/2023    HDL 45.5 08/29/2023    ALT 21 11/03/2024    AST 29 11/03/2024     11/03/2024    K 4.0 11/03/2024     11/03/2024    CREATININE 1.18 11/03/2024    BUN 31 (H) 11/03/2024    CO2 26 11/03/2024    TSH 3.63 08/29/2023    INR 1.3 (H) 11/01/2024    HGBA1C 6.4 (H) 10/29/2024     No results found for: \"CKTOTAL\", \"CKMB\", \"CKMBINDEX\", \"TROPONINI\"     Lab Results   Component Value Date    INR 1.3 (H) 11/01/2024    INR 1.0 02/22/2023    INR 1.0 07/22/2022    PROTIME 14.5 (H) 11/01/2024    PROTIME 11.8 02/22/2023    PROTIME 11.9 07/22/2022             Assessment/Plan     Patient volume status appears to be good now and his weight has been stable at home.  I suggested to him to take Lasix 20 mg every other day for maintenance of his volume status along with his other medications from cardiac standpoint.  Will give him refills for his Eliquis.  His heart rate sound regular now and no need for cardioversion at this point.  Will repeat echocardiogram and follow-up with him in 3 months.  Labs before next visit.          Nelson Shin MD, PhD, [facility], [facility]  Interventional Cardiology, [facility]  Associate Professor of Medicine, [facility]  Office: [phone]         **Disclaimer: This note was dictated by speech recognition, and every effort has been made to prevent any error in transcription, however minor errors may be present**    "

## 2024-12-30 ENCOUNTER — APPOINTMENT (OUTPATIENT)
Dept: RADIOLOGY | Facility: HOSPITAL | Age: 89
End: 2024-12-30
Payer: MEDICARE

## 2024-12-30 ENCOUNTER — HOSPITAL ENCOUNTER (EMERGENCY)
Facility: HOSPITAL | Age: 89
Discharge: HOME | End: 2024-12-31
Attending: STUDENT IN AN ORGANIZED HEALTH CARE EDUCATION/TRAINING PROGRAM
Payer: MEDICARE

## 2024-12-30 ENCOUNTER — APPOINTMENT (OUTPATIENT)
Dept: CARDIOLOGY | Facility: HOSPITAL | Age: 89
End: 2024-12-30
Payer: MEDICARE

## 2024-12-30 VITALS
TEMPERATURE: 97.5 F | SYSTOLIC BLOOD PRESSURE: 118 MMHG | DIASTOLIC BLOOD PRESSURE: 60 MMHG | HEIGHT: 68 IN | OXYGEN SATURATION: 97 % | WEIGHT: 143 LBS | HEART RATE: 81 BPM | BODY MASS INDEX: 21.67 KG/M2 | RESPIRATION RATE: 20 BRPM

## 2024-12-30 DIAGNOSIS — R06.02 SHORTNESS OF BREATH: Primary | ICD-10-CM

## 2024-12-30 DIAGNOSIS — J21.0 RSV (ACUTE BRONCHIOLITIS DUE TO RESPIRATORY SYNCYTIAL VIRUS): ICD-10-CM

## 2024-12-30 DIAGNOSIS — I50.9 CONGESTIVE HEART FAILURE, UNSPECIFIED HF CHRONICITY, UNSPECIFIED HEART FAILURE TYPE: ICD-10-CM

## 2024-12-30 LAB
ALBUMIN SERPL BCP-MCNC: 4.3 G/DL (ref 3.4–5)
ALP SERPL-CCNC: 105 U/L (ref 33–136)
ALT SERPL W P-5'-P-CCNC: 24 U/L (ref 10–52)
ANION GAP SERPL CALC-SCNC: 15 MMOL/L (ref 10–20)
AST SERPL W P-5'-P-CCNC: 46 U/L (ref 9–39)
BASOPHILS # BLD AUTO: 0.03 X10*3/UL (ref 0–0.1)
BASOPHILS NFR BLD AUTO: 0.5 %
BILIRUB SERPL-MCNC: 1.6 MG/DL (ref 0–1.2)
BNP SERPL-MCNC: 742 PG/ML (ref 0–99)
BUN SERPL-MCNC: 49 MG/DL (ref 6–23)
CALCIUM SERPL-MCNC: 9.8 MG/DL (ref 8.6–10.3)
CARDIAC TROPONIN I PNL SERPL HS: 61 NG/L (ref 0–20)
CARDIAC TROPONIN I PNL SERPL HS: 71 NG/L (ref 0–20)
CHLORIDE SERPL-SCNC: 106 MMOL/L (ref 98–107)
CO2 SERPL-SCNC: 22 MMOL/L (ref 21–32)
CREAT SERPL-MCNC: 1.27 MG/DL (ref 0.5–1.3)
EGFRCR SERPLBLD CKD-EPI 2021: 54 ML/MIN/1.73M*2
EOSINOPHIL # BLD AUTO: 0.14 X10*3/UL (ref 0–0.4)
EOSINOPHIL NFR BLD AUTO: 2.4 %
ERYTHROCYTE [DISTWIDTH] IN BLOOD BY AUTOMATED COUNT: 14 % (ref 11.5–14.5)
FLUAV RNA RESP QL NAA+PROBE: NOT DETECTED
FLUBV RNA RESP QL NAA+PROBE: NOT DETECTED
GLUCOSE SERPL-MCNC: 99 MG/DL (ref 74–99)
HCT VFR BLD AUTO: 39.3 % (ref 41–52)
HGB BLD-MCNC: 12.8 G/DL (ref 13.5–17.5)
IMM GRANULOCYTES # BLD AUTO: 0.02 X10*3/UL (ref 0–0.5)
IMM GRANULOCYTES NFR BLD AUTO: 0.3 % (ref 0–0.9)
LYMPHOCYTES # BLD AUTO: 1.38 X10*3/UL (ref 0.8–3)
LYMPHOCYTES NFR BLD AUTO: 23.6 %
MAGNESIUM SERPL-MCNC: 1.96 MG/DL (ref 1.6–2.4)
MCH RBC QN AUTO: 31.6 PG (ref 26–34)
MCHC RBC AUTO-ENTMCNC: 32.6 G/DL (ref 32–36)
MCV RBC AUTO: 97 FL (ref 80–100)
MONOCYTES # BLD AUTO: 0.7 X10*3/UL (ref 0.05–0.8)
MONOCYTES NFR BLD AUTO: 12 %
NEUTROPHILS # BLD AUTO: 3.58 X10*3/UL (ref 1.6–5.5)
NEUTROPHILS NFR BLD AUTO: 61.2 %
NRBC BLD-RTO: 0 /100 WBCS (ref 0–0)
PLATELET # BLD AUTO: 147 X10*3/UL (ref 150–450)
POTASSIUM SERPL-SCNC: 4.6 MMOL/L (ref 3.5–5.3)
PROT SERPL-MCNC: 7.3 G/DL (ref 6.4–8.2)
RBC # BLD AUTO: 4.05 X10*6/UL (ref 4.5–5.9)
RSV RNA RESP QL NAA+PROBE: DETECTED
SARS-COV-2 RNA RESP QL NAA+PROBE: NOT DETECTED
SODIUM SERPL-SCNC: 138 MMOL/L (ref 136–145)
WBC # BLD AUTO: 5.9 X10*3/UL (ref 4.4–11.3)

## 2024-12-30 PROCEDURE — 2500000004 HC RX 250 GENERAL PHARMACY W/ HCPCS (ALT 636 FOR OP/ED): Performed by: NURSE PRACTITIONER

## 2024-12-30 PROCEDURE — 83735 ASSAY OF MAGNESIUM: CPT | Performed by: NURSE PRACTITIONER

## 2024-12-30 PROCEDURE — 80053 COMPREHEN METABOLIC PANEL: CPT | Performed by: NURSE PRACTITIONER

## 2024-12-30 PROCEDURE — 71046 X-RAY EXAM CHEST 2 VIEWS: CPT

## 2024-12-30 PROCEDURE — 83880 ASSAY OF NATRIURETIC PEPTIDE: CPT | Performed by: NURSE PRACTITIONER

## 2024-12-30 PROCEDURE — 85025 COMPLETE CBC W/AUTO DIFF WBC: CPT | Performed by: NURSE PRACTITIONER

## 2024-12-30 PROCEDURE — 87637 SARSCOV2&INF A&B&RSV AMP PRB: CPT | Performed by: NURSE PRACTITIONER

## 2024-12-30 PROCEDURE — 99285 EMERGENCY DEPT VISIT HI MDM: CPT | Mod: 25 | Performed by: STUDENT IN AN ORGANIZED HEALTH CARE EDUCATION/TRAINING PROGRAM

## 2024-12-30 PROCEDURE — 96374 THER/PROPH/DIAG INJ IV PUSH: CPT

## 2024-12-30 PROCEDURE — 36415 COLL VENOUS BLD VENIPUNCTURE: CPT | Performed by: NURSE PRACTITIONER

## 2024-12-30 PROCEDURE — 84484 ASSAY OF TROPONIN QUANT: CPT | Performed by: NURSE PRACTITIONER

## 2024-12-30 PROCEDURE — 71046 X-RAY EXAM CHEST 2 VIEWS: CPT | Performed by: RADIOLOGY

## 2024-12-30 PROCEDURE — 93005 ELECTROCARDIOGRAM TRACING: CPT

## 2024-12-30 RX ORDER — FUROSEMIDE 10 MG/ML
40 INJECTION INTRAMUSCULAR; INTRAVENOUS ONCE
Status: COMPLETED | OUTPATIENT
Start: 2024-12-30 | End: 2024-12-30

## 2024-12-30 RX ORDER — BENZONATATE 100 MG/1
100 CAPSULE ORAL 3 TIMES DAILY PRN
Qty: 21 CAPSULE | Refills: 0 | Status: SHIPPED | OUTPATIENT
Start: 2024-12-30 | End: 2025-01-06

## 2024-12-30 RX ADMIN — FUROSEMIDE 40 MG: 10 INJECTION, SOLUTION INTRAMUSCULAR; INTRAVENOUS at 22:35

## 2024-12-30 ASSESSMENT — PAIN SCALES - GENERAL
PAINLEVEL_OUTOF10: 0 - NO PAIN
PAINLEVEL_OUTOF10: 0 - NO PAIN

## 2024-12-30 ASSESSMENT — LIFESTYLE VARIABLES
HAVE PEOPLE ANNOYED YOU BY CRITICIZING YOUR DRINKING: NO
HAVE YOU EVER FELT YOU SHOULD CUT DOWN ON YOUR DRINKING: NO
EVER FELT BAD OR GUILTY ABOUT YOUR DRINKING: NO
TOTAL SCORE: 0
EVER HAD A DRINK FIRST THING IN THE MORNING TO STEADY YOUR NERVES TO GET RID OF A HANGOVER: NO

## 2024-12-30 ASSESSMENT — COLUMBIA-SUICIDE SEVERITY RATING SCALE - C-SSRS
6. HAVE YOU EVER DONE ANYTHING, STARTED TO DO ANYTHING, OR PREPARED TO DO ANYTHING TO END YOUR LIFE?: NO
2. HAVE YOU ACTUALLY HAD ANY THOUGHTS OF KILLING YOURSELF?: NO
1. IN THE PAST MONTH, HAVE YOU WISHED YOU WERE DEAD OR WISHED YOU COULD GO TO SLEEP AND NOT WAKE UP?: NO

## 2024-12-30 ASSESSMENT — PAIN - FUNCTIONAL ASSESSMENT: PAIN_FUNCTIONAL_ASSESSMENT: 0-10

## 2024-12-30 NOTE — ED TRIAGE NOTES
PT. STATES STARTED WITH COUGH/COLD SYMPTOMS LAST WEEK. PT. STATES SAW PMD ON FRIDAY AND WAS GIVEN ANTIX, BUT NOT FEELING BETTER. DENIES SWELLING IN LEGS. PT. STATES HAVING TROUBLE SLEEPING/LAYING DOWN DUE TO COUGHING/SOB. PT. WITH MOIST COUGH NOTED IN TRIAGE, RHONCHI TO JULIO. LUNG BASES.

## 2024-12-30 NOTE — ED PROVIDER NOTES
HPI   Chief Complaint   Patient presents with    Shortness of Breath     PT. STATES STARTED WITH COUGH/COLD SYMPTOMS LAST WEEK. PT. STATES SAW PMD ON FRIDAY AND WAS GIVEN ANTIX, BUT NOT FEELING BETTER. DENIES SWELLING IN LEGS. PT. STATES HAVING TROUBLE SLEEPING/LAYING DOWN DUE TO COUGHING/SOB. PT. WITH MOIST COUGH NOTED IN TRIAGE, RHONCHI TO JULIO. LUNG BASES.       Patient is an 89-year-old male with past medical history of heart failure, hypertension, and prostate cancer, who presents ED today due to shortness of breath.  Patient states over the past few days he has been having increasing shortness of breath.  Patient was advised if not improving he should come into the emergency department for evaluation.  He denies any fevers or chills.  He has had some nasal congestion.  Denies any swelling in his lower extremities.  He denies any recent weight gain.      History provided by:  Patient   used: No            Patient History   Past Medical History:   Diagnosis Date    CHF (congestive heart failure)     Hypertension     Personal history of malignant neoplasm of prostate     History of malignant neoplasm of prostate    Personal history of other malignant neoplasm of skin     History of malignant neoplasm of skin     Past Surgical History:   Procedure Laterality Date    MR HEAD ANGIO WO IV CONTRAST  2/22/2023    MR HEAD ANGIO WO IV CONTRAST PAR MRI    MR NECK ANGIO WO IV CONTRAST  2/22/2023    MR NECK ANGIO WO IV CONTRAST PAR MRI    OTHER SURGICAL HISTORY  05/11/2022    Cyst excision    OTHER SURGICAL HISTORY  05/11/2022    Coronary artery stent placement    OTHER SURGICAL HISTORY  05/17/2022    Radical prostatectomy    OTHER SURGICAL HISTORY  05/17/2022    Cardiac catheterization    OTHER SURGICAL HISTORY  05/17/2022    Hip replacement    OTHER SURGICAL HISTORY  05/17/2022    Hip replacement     Family History   Problem Relation Name Age of Onset    Breast cancer Daughter      Breast cancer  Maternal Grandmother       Social History     Tobacco Use    Smoking status: Former     Types: Cigarettes    Smokeless tobacco: Never   Vaping Use    Vaping status: Never Used   Substance Use Topics    Alcohol use: Yes     Comment: SOCIALLY    Drug use: Never       Physical Exam   ED Triage Vitals [12/30/24 0742]   Temperature Heart Rate Respirations BP   37.1 °C (98.8 °F) 77 20 115/60      Pulse Ox Temp Source Heart Rate Source Patient Position   97 % Temporal Monitor Sitting      BP Location FiO2 (%)     Left arm --       Physical Exam  Vitals and nursing note reviewed.   Constitutional:       General: He is not in acute distress.     Appearance: He is well-developed.   HENT:      Head: Normocephalic and atraumatic.   Eyes:      Conjunctiva/sclera: Conjunctivae normal.   Cardiovascular:      Rate and Rhythm: Normal rate and regular rhythm.      Heart sounds: No murmur heard.  Pulmonary:      Effort: Pulmonary effort is normal. No respiratory distress.      Breath sounds: Examination of the right-lower field reveals rhonchi. Examination of the left-lower field reveals rhonchi. Rhonchi present.   Abdominal:      Palpations: Abdomen is soft.      Tenderness: There is no abdominal tenderness.   Musculoskeletal:         General: No swelling.      Cervical back: Neck supple.   Skin:     General: Skin is warm and dry.      Capillary Refill: Capillary refill takes less than 2 seconds.   Neurological:      Mental Status: He is alert.   Psychiatric:         Mood and Affect: Mood normal.           ED Course & MDM   ED Course as of 12/30/24 1710   Mon Dec 30, 2024   0810 ECG 12 Lead  EKG, my read, 0749  Sinus rhythm with left bundle branch block, no acute ST elevation or depression.  Ventricular rate-75 BPM [WS]   0833 CBC and Auto Differential(!)  Mild anemia and thrombocytopenia.  No leukocytosis. [WS]   0844 Sars-CoV-2 and Influenza A/B PCR  Negative [WS]   0853 XR chest 2 views  1. Cardiomegaly.  2. Air-fluid levels  within the upper abdomen suggesting gastroenteritis.   [WS]   0902 B-Type Natriuretic Peptide(!)  Elevated, this is consistent with likely heart failure exacerbation given his symptoms. [WS]   0902 Comprehensive Metabolic Panel(!)  CMP is stable, no electrolyte abnormalities, metabolic disorder, kidney injury, or elevated liver enzymes consistent with liver pathology. [WS]   0902 Magnesium  Normal [WS]   0914 Troponin I, High Sensitivity(!!)  Mildly elevated, will be repeated [WS]   0917 RSV PCR(!)  Positive [WS]   1037 Troponin I, High Sensitivity(!!)  Downtrending [WS]      ED Course User Index  [WS] Chaitanya Figueroa, APRN-CNP                 No data recorded     Arvind Coma Scale Score: 15 (12/30/24 0744 : Deana Mclean RN)                           Medical Decision Making    Medical Decision Making & ED Course  Medical Decision Making:  Patient is an 89-year-old male with past medical history of heart failure, hypertension, and prostate cancer, who presents ED today due to shortness of breath.  Patient states over the past few days he has been having increasing shortness of breath.  Patient was advised if not improving he should come into the emergency department for evaluation.  He denies any fevers or chills.  He has had some nasal congestion.  Denies any swelling in his lower extremities.  He denies any recent weight gain.  Patient since troponin is elevated, repeat was downtrending, he will need to be offered admission for this given his shortness of breath and elevated BNP consistent with heart failure exacerbation.  Patient is also RSV positive, this may be the cause of his shortness of breath however he is fluid overloaded per his x-ray and BNP.  Patient CMP is stable without electrolyte malady, kidney injury, or liver pathology.  Patient's magnesium was normal.  Patient is influenza and COVID testing were negative.  Patient CBC showed no leukocytosis, mild anemia, and slight thrombocytopenia.  Patient's  chest x-ray reveals cardiomegaly and air-fluid level in the upper abdomen.  He is not having any nausea or vomiting, this is likely an incidental finding.  Patient is EKG was reviewed and is similar to prior.  --  Differential diagnoses considered include but are not limited to: ACS, NSTEMI, arrhythmia, STEMI, heart failure, electrolyte derangement, GERD, anemia, pneumonia, COVID, influenza, RSV     Social Determinants of Health which Significantly Impact Care: None identified     EKG Independent Interpretation: EKG interpreted by myself. Please see ED Course for full interpretation.    Independent Result Review and Interpretation: Relevant laboratory and radiographic results were reviewed and independently interpreted by myself.  As necessary, they are commented on in the ED Course.    Chronic conditions affecting the patient's care: As documented above in [facility]    The patient was discussed with the following consultants/services: None    Care Considerations: As documented above in [facility]    ED Course:  ED Course as of 12/30/24 1711  ------------------------------------------------------------  Time: 12/30 0810  Value: ECG 12 Lead  Comment: EKG, my read, 0749Sinus rhythm with left bundle branch block, no acute ST elevation or depression. Ventricular rate-75 BPM  By: PADMINI Mcnair  ------------------------------------------------------------  Time: 12/30 0833  Value: CBC and Auto Differential(!)  Comment: Mild anemia and thrombocytopenia.  No leukocytosis.  By: PADMINI Mcnair  ------------------------------------------------------------  Time: 12/30 0844  Value: Sars-CoV-2 and Influenza A/B PCR  Comment: Negative  By: PADMINI Mcnair  ------------------------------------------------------------  Time: 12/30 2078  Value: XR chest 2 views  Comment: 1. Cardiomegaly.2. Air-fluid levels within the upper abdomen suggesting gastroenteritis.  By: Chaitanya Figueroa,  APRN-CNP  ------------------------------------------------------------  Time: 12/30 0902  Value: B-Type Natriuretic Peptide(!)  Comment: Elevated, this is consistent with likely heart failure exacerbation given his symptoms.  By: PADMINI Mcnair  ------------------------------------------------------------  Time: 12/30 0902  Value: Comprehensive Metabolic Panel(!)  Comment: CMP is stable, no electrolyte abnormalities, metabolic disorder, kidney injury, or elevated liver enzymes consistent with liver pathology.  By: PADMINI Mcnair  ------------------------------------------------------------  Time: 12/30 0902  Value: Magnesium  Comment: Normal  By: PADMINI Mcnair  ------------------------------------------------------------  Time: 12/30 0914  Value: Troponin I, High Sensitivity(!!)  Comment: Mildly elevated, will be repeated  By: PADMINI Mcnair  ------------------------------------------------------------  Time: 12/30 0917  Value: RSV PCR(!)  Comment: Positive  By: PADMINI Mcnair  ------------------------------------------------------------  Time: 12/30 1037  Value: Troponin I, High Sensitivity(!!)  Comment: Downtrending  By: PADMINI Mcnair     Disposition  Patient was signed out to BONNIE EMANUEL at 1700 pending completion of their work-up.  Please see the next provider's transition of care note for the remainder of the patient's care.     Patient was seen independently    PADMINI Mcnair  Emergency Medicine         Amount and/or Complexity of Data Reviewed  Labs: ordered. Decision-making details documented in ED Course.  Radiology: ordered and independent interpretation performed. Decision-making details documented in ED Course.  ECG/medicine tests: ordered and independent interpretation performed. Decision-making details documented in ED Course.    Risk  OTC drugs.  Prescription drug management.  Decision regarding  hospitalization.        Procedure  Procedures     Chaitanya Figueroa, APRN-CNP  12/30/24 [id number]

## 2024-12-31 LAB
Q ONSET: 213 MS
QRS COUNT: 13 BEATS
QRS DURATION: 156 MS
QT INTERVAL: 440 MS
QTC CALCULATION(BAZETT): 491 MS
QTC FREDERICIA: 473 MS
R AXIS: 269 DEGREES
T AXIS: 96 DEGREES
T OFFSET: 433 MS
VENTRICULAR RATE: 75 BPM

## 2024-12-31 NOTE — PROGRESS NOTES
Emergency Medicine Transition of Care Note.    I received Patrick Vázquez in signout from [employer] [facility].  Please see the previous ED provider note for all HPI, PE and MDM up to the time of signout at 1700. This is in addition to the primary record.    In brief Patrick Vázquez is an 89 y.o. male presenting for   Chief Complaint   Patient presents with    Shortness of Breath     PT. STATES STARTED WITH COUGH/COLD SYMPTOMS LAST WEEK. PT. STATES SAW PMD ON FRIDAY AND WAS GIVEN ANTIX, BUT NOT FEELING BETTER. DENIES SWELLING IN LEGS. PT. STATES HAVING TROUBLE SLEEPING/LAYING DOWN DUE TO COUGHING/SOB. PT. WITH MOIST COUGH NOTED IN TRIAGE, RHONCHI TO JULIO. LUNG BASES.     At the time of signout we were awaiting: Reevaluation     ED Course as of 12/31/24 0100   Mon Dec 30, 2024   0810 ECG 12 Lead  EKG, my read, 0749  Sinus rhythm with left bundle branch block, no acute ST elevation or depression.  Ventricular rate-75 BPM [WS]   0833 CBC and Auto Differential(!)  Mild anemia and thrombocytopenia.  No leukocytosis. [WS]   0844 Sars-CoV-2 and Influenza A/B PCR  Negative [WS]   0853 XR chest 2 views  1. Cardiomegaly.  2. Air-fluid levels within the upper abdomen suggesting gastroenteritis.   [WS]   0902 B-Type Natriuretic Peptide(!)  Elevated, this is consistent with likely heart failure exacerbation given his symptoms. [WS]   0902 Comprehensive Metabolic Panel(!)  CMP is stable, no electrolyte abnormalities, metabolic disorder, kidney injury, or elevated liver enzymes consistent with liver pathology. [WS]   0902 Magnesium  Normal [WS]   0914 Troponin I, High Sensitivity(!!)  Mildly elevated, will be repeated [WS]   0917 RSV PCR(!)  Positive [WS]   1037 Troponin I, High Sensitivity(!!)  Downtrending [WS]      ED Course User Index  [WS] Chaitanya Figueroa, APRN-CNP         Diagnoses as of 12/31/24 0100   Shortness of breath   Congestive heart failure, unspecified HF chronicity, unspecified heart failure type   RSV (acute bronchiolitis due to  respiratory syncytial virus)       Medical Decision Making  Patient care was signed out to me at this time.  Please refer to initial providers note for initial plan of care of this patient.  Patient care was signed out pending reevaluation.  Reevaluation of patient reveals improvement he states he does feel better and denying any current short of breath but states it does happen at night while laying flat.  Imaging results and lab work were discussed with patient as well.  Walking pulse ox was performed and reveals improvement as pulse ox stayed consistently above 97% on room air.  Discussion was had with patient regards to admission versus discharge home.  Through shared decision making plan will be for patient to be discharged home with prescription for Tessalon Perles and to closely monitor symptoms, if they become worse was given strict precautions return to emergency room for further evaluation.  Patient was agreeable this plan and discharged home in stable condition.    Final diagnoses:   [R06.02] Shortness of breath   [I50.9] Congestive heart failure, unspecified HF chronicity, unspecified heart failure type   [J21.0] RSV (acute bronchiolitis due to respiratory syncytial virus)           Procedure  Procedures    Maxime Velarde, APRN-CNP

## 2025-01-01 ENCOUNTER — HOSPITAL ENCOUNTER (INPATIENT)
Age: OVER 89
End: 2025-01-01
Attending: INTERNAL MEDICINE | Admitting: INTERNAL MEDICINE
Payer: MEDICARE

## 2025-01-01 ENCOUNTER — APPOINTMENT (OUTPATIENT)
Dept: CARDIOLOGY | Facility: HOSPITAL | Age: OVER 89
DRG: 698 | End: 2025-01-01
Payer: MEDICARE

## 2025-01-01 ENCOUNTER — HOSPITAL ENCOUNTER (INPATIENT)
Dept: CARDIOLOGY | Facility: HOSPITAL | Age: OVER 89
Discharge: HOME | DRG: 698 | End: 2025-05-08
Payer: MEDICARE

## 2025-01-01 ENCOUNTER — APPOINTMENT (OUTPATIENT)
Dept: RADIOLOGY | Facility: HOSPITAL | Age: OVER 89
DRG: 698 | End: 2025-01-01
Payer: MEDICARE

## 2025-01-01 PROCEDURE — 93010 ELECTROCARDIOGRAM REPORT: CPT | Performed by: STUDENT IN AN ORGANIZED HEALTH CARE EDUCATION/TRAINING PROGRAM

## 2025-01-01 PROCEDURE — 93005 ELECTROCARDIOGRAM TRACING: CPT

## 2025-01-01 PROCEDURE — 71045 X-RAY EXAM CHEST 1 VIEW: CPT

## 2025-01-01 PROCEDURE — 93356 MYOCRD STRAIN IMG SPCKL TRCK: CPT | Performed by: STUDENT IN AN ORGANIZED HEALTH CARE EDUCATION/TRAINING PROGRAM

## 2025-01-01 PROCEDURE — 93356 MYOCRD STRAIN IMG SPCKL TRCK: CPT

## 2025-01-01 PROCEDURE — 76857 US EXAM PELVIC LIMITED: CPT

## 2025-01-01 PROCEDURE — 93321 DOPPLER ECHO F-UP/LMTD STD: CPT | Performed by: STUDENT IN AN ORGANIZED HEALTH CARE EDUCATION/TRAINING PROGRAM

## 2025-01-01 PROCEDURE — 74176 CT ABD & PELVIS W/O CONTRAST: CPT

## 2025-01-01 PROCEDURE — 76376 3D RENDER W/INTRP POSTPROCES: CPT | Performed by: STUDENT IN AN ORGANIZED HEALTH CARE EDUCATION/TRAINING PROGRAM

## 2025-01-01 PROCEDURE — 93325 DOPPLER ECHO COLOR FLOW MAPG: CPT | Performed by: STUDENT IN AN ORGANIZED HEALTH CARE EDUCATION/TRAINING PROGRAM

## 2025-01-01 PROCEDURE — 93308 TTE F-UP OR LMTD: CPT | Performed by: STUDENT IN AN ORGANIZED HEALTH CARE EDUCATION/TRAINING PROGRAM

## 2025-01-28 DIAGNOSIS — I50.33 CHF (CONGESTIVE HEART FAILURE), NYHA CLASS II, ACUTE ON CHRONIC, DIASTOLIC: ICD-10-CM

## 2025-01-28 DIAGNOSIS — I50.43 ACUTE ON CHRONIC COMBINED SYSTOLIC AND DIASTOLIC CONGESTIVE HEART FAILURE: ICD-10-CM

## 2025-01-28 RX ORDER — FUROSEMIDE 20 MG/1
20 TABLET ORAL EVERY OTHER DAY
Qty: 45 TABLET | Refills: 1 | Status: SHIPPED | OUTPATIENT
Start: 2025-01-28 | End: 2026-01-28

## 2025-01-30 ENCOUNTER — HOSPITAL ENCOUNTER (OUTPATIENT)
Dept: RADIOLOGY | Facility: HOSPITAL | Age: OVER 89
Discharge: HOME | End: 2025-01-30
Payer: MEDICARE

## 2025-01-30 DIAGNOSIS — R63.4 ABNORMAL WEIGHT LOSS: ICD-10-CM

## 2025-01-30 PROCEDURE — 74176 CT ABD & PELVIS W/O CONTRAST: CPT

## 2025-01-30 PROCEDURE — 2550000001 HC RX 255 CONTRASTS: Performed by: SPECIALIST

## 2025-01-30 RX ADMIN — IOHEXOL 40 ML: 300 INJECTION, SOLUTION INTRAVENOUS at 12:07

## 2025-02-11 DIAGNOSIS — I10 ESSENTIAL (PRIMARY) HYPERTENSION: ICD-10-CM

## 2025-02-11 DIAGNOSIS — I25.10 CORONARY ARTERY DISEASE, UNSPECIFIED VESSEL OR LESION TYPE, UNSPECIFIED WHETHER ANGINA PRESENT, UNSPECIFIED WHETHER NATIVE OR TRANSPLANTED HEART: ICD-10-CM

## 2025-02-11 RX ORDER — ATORVASTATIN CALCIUM 20 MG/1
20 TABLET, FILM COATED ORAL DAILY
Qty: 90 TABLET | Refills: 2 | Status: SHIPPED | OUTPATIENT
Start: 2025-02-11

## 2025-03-06 ENCOUNTER — HOSPITAL ENCOUNTER (OUTPATIENT)
Dept: CARDIOLOGY | Facility: CLINIC | Age: OVER 89
Discharge: HOME | End: 2025-03-06
Payer: MEDICARE

## 2025-03-06 VITALS
SYSTOLIC BLOOD PRESSURE: 118 MMHG | WEIGHT: 143 LBS | HEIGHT: 68 IN | BODY MASS INDEX: 21.67 KG/M2 | DIASTOLIC BLOOD PRESSURE: 60 MMHG

## 2025-03-06 DIAGNOSIS — Z98.890 S/P MITRAL VALVE CLIP IMPLANTATION: ICD-10-CM

## 2025-03-06 DIAGNOSIS — Z95.818 S/P MITRAL VALVE CLIP IMPLANTATION: ICD-10-CM

## 2025-03-06 DIAGNOSIS — I50.42 CHRONIC COMBINED SYSTOLIC AND DIASTOLIC CONGESTIVE HEART FAILURE: ICD-10-CM

## 2025-03-06 PROCEDURE — 93306 TTE W/DOPPLER COMPLETE: CPT | Performed by: STUDENT IN AN ORGANIZED HEALTH CARE EDUCATION/TRAINING PROGRAM

## 2025-03-06 PROCEDURE — 93306 TTE W/DOPPLER COMPLETE: CPT

## 2025-03-07 LAB
AORTIC VALVE MEAN GRADIENT: 4 MMHG
AORTIC VALVE PEAK VELOCITY: 1.4 M/S
AV PEAK GRADIENT: 8 MMHG
AVA (PEAK VEL): 1.47 CM2
AVA (VTI): 1.25 CM2
EJECTION FRACTION APICAL 4 CHAMBER: 55.8
EJECTION FRACTION: 53 %
LEFT ATRIUM VOLUME AREA LENGTH INDEX BSA: 57.1 ML/M2
LEFT VENTRICLE INTERNAL DIMENSION DIASTOLE: 3.72 CM (ref 3.5–6)
LEFT VENTRICULAR OUTFLOW TRACT DIAMETER: 2.01 CM
MITRAL VALVE E/A RATIO: 1.96
RIGHT VENTRICLE FREE WALL PEAK S': 0.7 CM/S
RIGHT VENTRICLE PEAK SYSTOLIC PRESSURE: 38.5 MMHG
TRICUSPID ANNULAR PLANE SYSTOLIC EXCURSION: 1.7 CM

## 2025-03-10 ENCOUNTER — APPOINTMENT (OUTPATIENT)
Dept: CARDIOLOGY | Facility: CLINIC | Age: OVER 89
End: 2025-03-10
Payer: MEDICARE

## 2025-03-19 ENCOUNTER — APPOINTMENT (OUTPATIENT)
Dept: OPHTHALMOLOGY | Facility: CLINIC | Age: OVER 89
End: 2025-03-19
Payer: MEDICARE

## 2025-03-28 ENCOUNTER — APPOINTMENT (OUTPATIENT)
Dept: CARDIOLOGY | Facility: CLINIC | Age: OVER 89
End: 2025-03-28
Payer: MEDICARE

## 2025-04-07 ENCOUNTER — OFFICE VISIT (OUTPATIENT)
Dept: CARDIOLOGY | Facility: CLINIC | Age: OVER 89
End: 2025-04-07
Payer: MEDICARE

## 2025-04-07 VITALS
BODY MASS INDEX: 23.42 KG/M2 | HEART RATE: 61 BPM | DIASTOLIC BLOOD PRESSURE: 74 MMHG | WEIGHT: 154 LBS | OXYGEN SATURATION: 98 % | SYSTOLIC BLOOD PRESSURE: 110 MMHG

## 2025-04-07 DIAGNOSIS — I25.10 CORONARY ARTERY DISEASE, UNSPECIFIED VESSEL OR LESION TYPE, UNSPECIFIED WHETHER ANGINA PRESENT, UNSPECIFIED WHETHER NATIVE OR TRANSPLANTED HEART: ICD-10-CM

## 2025-04-07 DIAGNOSIS — Z98.890 S/P MITRAL VALVE CLIP IMPLANTATION: Primary | ICD-10-CM

## 2025-04-07 DIAGNOSIS — I25.10 CORONARY ARTERY DISEASE INVOLVING NATIVE CORONARY ARTERY OF NATIVE HEART, UNSPECIFIED WHETHER ANGINA PRESENT: ICD-10-CM

## 2025-04-07 DIAGNOSIS — Z95.818 S/P MITRAL VALVE CLIP IMPLANTATION: Primary | ICD-10-CM

## 2025-04-07 PROCEDURE — 99214 OFFICE O/P EST MOD 30 MIN: CPT | Performed by: STUDENT IN AN ORGANIZED HEALTH CARE EDUCATION/TRAINING PROGRAM

## 2025-04-07 PROCEDURE — 3074F SYST BP LT 130 MM HG: CPT | Performed by: STUDENT IN AN ORGANIZED HEALTH CARE EDUCATION/TRAINING PROGRAM

## 2025-04-07 PROCEDURE — 1036F TOBACCO NON-USER: CPT | Performed by: STUDENT IN AN ORGANIZED HEALTH CARE EDUCATION/TRAINING PROGRAM

## 2025-04-07 PROCEDURE — 1157F ADVNC CARE PLAN IN RCRD: CPT | Performed by: STUDENT IN AN ORGANIZED HEALTH CARE EDUCATION/TRAINING PROGRAM

## 2025-04-07 PROCEDURE — 3078F DIAST BP <80 MM HG: CPT | Performed by: STUDENT IN AN ORGANIZED HEALTH CARE EDUCATION/TRAINING PROGRAM

## 2025-04-07 PROCEDURE — 1159F MED LIST DOCD IN RCRD: CPT | Performed by: STUDENT IN AN ORGANIZED HEALTH CARE EDUCATION/TRAINING PROGRAM

## 2025-04-07 RX ORDER — MULTIVITAMIN
1 TABLET ORAL DAILY
COMMUNITY
Start: 2022-05-31

## 2025-04-07 RX ORDER — CLOPIDOGREL BISULFATE 75 MG/1
1 TABLET ORAL
COMMUNITY
Start: 2025-01-26

## 2025-04-07 RX ORDER — ATORVASTATIN CALCIUM 20 MG/1
40 TABLET, FILM COATED ORAL DAILY
Qty: 180 TABLET | Refills: 3 | Status: SHIPPED | OUTPATIENT
Start: 2025-04-07 | End: 2026-04-07

## 2025-04-07 NOTE — PROGRESS NOTES
Encounter Date: 3/6/2024     Signed       Expand All Collapse All    Signed  Encounter Date: 9/20/2023  Nelson Shin MD PhD         Diagnoses/Problems  Assessed    · Coronary artery disease (414.00) (I25.10)   · HTN (hypertension) (401.9) (I10)     Orders  Coronary artery disease, HTN (hypertension)    · Basic Metabolic Panel; Status:Active - Retrospective By Protocol Authorization;  Requested for:[id number];    · Magnesium, Serum; Status:Active - Retrospective By Protocol Authorization; Requested  for:[id number];      Chief Complaint        RAMSES VALENTE is being seen for a 6 month follow-up of. Echo, labs results      History of Present Illness  May 23, 2022  Patient is an 87-year-old male who presented with non-ST elevation MI to [facility] in May 2022. He was found to have ejection fraction of 40 to 45% with severe mitral regurgitation. He underwent right and left heart catheterization and he was found to have significant lesion in proximal to mid LAD which was treated successfully with a 2.75 x 38 mm Synergy drug-eluting stent postdilated with a 3.0 NC balloon. He underwent transesophageal echocardiogram that confirmed the presence of severe mitral regurgitation. He was sent to be evaluated at [facility] the structural team for consideration of MitraClip. He had the initial visit done last week. Awaiting to hear back.  Currently patient has shortness of breath with activity. Denies having chest pain. Currently takes torsemide 20 mg daily his EKG today shows sinus rhythm with a rate of 59 and left bundle branch block pattern     August 18, 2022  Patient had echocardiogram done yesterday that showed ejection fraction of 35 to 40% and a cardiac MRI was suggested for further evaluation of the valves after the MitraClip placement.  Patient himself today reports having no shortness of breath or palpitation no chest pain. Reports overall he is doing well.     November 16, 2022  Patient has received his  MitraClip. MRI post that showed mild residual mitral regurgitation with ejection fraction of 35%.  Denies having chest pain or shortness of breath.     March 8, 2023  Patient denies having chest pain or shortness of breath or palpitation.  He had his echocardiogram done on March 1, 2023 that showed ejection fraction of 45 to 50%, status post MitraClip with no significant residual mitral regurgitation. He also has moderate aortic valve stenosis.  His weight has been stable since last time.     September 20, 2023  Patient had his echocardiogram done today that shows mildly reduced ejection fraction of 45 to 50%. There is residual mild MR. He has mild aortic valve stenosis.  He denies having chest pain or shortness of breath or palpitation and reports he has been walking regularly and hiking.  His labs from August 2023 showed creatinine of 1.3.             03/06/24     Patrick Vázquez is a 88 y.o. male who is here for follow-up after about 6 months.  His main complaint is sometimes he had dizziness and had a fall.  Primary care physician stopped Lasix.  He feels better since then.        Patient denies any chest pain, shortness of breath, palpitation.    11/3/2024  1.  HFrEF.  Baseline cardiomyopathy ejection fraction 45% now down to 35% likely related to atrial fibrillation.  Continue volume removal.  Transition to oral Lasix.  He is feeling much better.  Optimize GDMT as tolerated.  Continue metoprolol succinate, angiotensin receptor blockers.  SGLT2 inhibitor should be started as an outpatient.  I restarted the spironolactone today  2.  Atrial fibrillation.  New onset with a chads vascular score 5.  Initiate Eliquis as we did yesterday.  Outpatient follow-up with Dr. Shin for cardioversion after 3 to 4 weeks.  3.  MitraClip.  Mild residual mitral regurgitation  4.  CAD.  Stable.  I stopped Plavix and added aspirin  5.  Aortic stenosis mild to moderate.     Okay for discharge from a cardiac standpoint.   Follow-up with Dr. Shin 2 to 4 weeks to plan for outpatient cardioversion in 4 weeks.   Frank Walker MD        12/11/24    Patrick Vázquez is a 89 y.o. male who is here after hospital admission over a month ago.  Currently denies having chest pain or shortness of breath or palpitation.  Last dose of his Lasix was about 1 week ago.  No recent labs after discharge.                        Follow up visit    04/07/25    Patrick Vázquez is a 89 y.o. male  who presents here for followup  Patient reports no issues today.  Denies chest pain or shortness of breath with his daily activities.  Reports cycling 15 minutes in the morning.      Past Medical History  Past Medical History:   Diagnosis Date    CHF (congestive heart failure)     Hypertension     Personal history of malignant neoplasm of prostate     History of malignant neoplasm of prostate    Personal history of other malignant neoplasm of skin     History of malignant neoplasm of skin        Past Surgical History  Past Surgical History:   Procedure Laterality Date    MR HEAD ANGIO WO IV CONTRAST  2/22/2023    MR HEAD ANGIO WO IV CONTRAST PAR MRI    MR NECK ANGIO WO IV CONTRAST  2/22/2023    MR NECK ANGIO WO IV CONTRAST PAR MRI    OTHER SURGICAL HISTORY  05/11/2022    Cyst excision    OTHER SURGICAL HISTORY  05/11/2022    Coronary artery stent placement    OTHER SURGICAL HISTORY  05/17/2022    Radical prostatectomy    OTHER SURGICAL HISTORY  05/17/2022    Cardiac catheterization    OTHER SURGICAL HISTORY  05/17/2022    Hip replacement    OTHER SURGICAL HISTORY  05/17/2022    Hip replacement        Social history  Social History     Socioeconomic History    Marital status:      Spouse name: Not on file    Number of children: Not on file    Years of education: Not on file    Highest education level: Not on file   Occupational History    Not on file   Tobacco Use    Smoking status: Former     Types: Cigarettes    Smokeless tobacco: Never   Vaping Use    Vaping  status: Never Used   Substance and Sexual Activity    Alcohol use: Yes     Comment: SOCIALLY    Drug use: Never    Sexual activity: Not on file   Other Topics Concern    Not on file   Social History Narrative    Not on file     Social Drivers of Health     Financial Resource Strain: Low Risk  (11/1/2024)    Overall Financial Resource Strain (CARDIA)     Difficulty of Paying Living Expenses: Not very hard   Food Insecurity: No Food Insecurity (11/1/2024)    Hunger Vital Sign     Worried About Running Out of Food in the Last Year: Never true     Ran Out of Food in the Last Year: Never true   Transportation Needs: No Transportation Needs (11/1/2024)    PRAPARE - Transportation     Lack of Transportation (Medical): No     Lack of Transportation (Non-Medical): No   Physical Activity: Not on file   Stress: Not on file   Social Connections: Not on file   Intimate Partner Violence: Not At Risk (11/1/2024)    Humiliation, Afraid, Rape, and Kick questionnaire     Fear of Current or Ex-Partner: No     Emotionally Abused: No     Physically Abused: No     Sexually Abused: No   Housing Stability: Low Risk  (11/1/2024)    Housing Stability Vital Sign     Unable to Pay for Housing in the Last Year: No     Number of Times Moved in the Last Year: 0     Homeless in the Last Year: No        Family History  Family History   Problem Relation Name Age of Onset    Breast cancer Daughter      Breast cancer Maternal Grandmother          12 system point of review is negative except for what described in history of present illness    Allergies:  Allergies   Allergen Reactions    Bee Venom Protein (Honey Bee) Hives and Itching     carries an epi pen        Outpatient Medications:  Current Outpatient Medications   Medication Instructions    acetaminophen (TYLENOL) 650 mg, oral, Every 6 hours PRN    albuterol 90 mcg/actuation inhaler 2 puffs, Every 6 hours PRN    amLODIPine (NORVASC) 2.5 mg, Daily    apixaban (ELIQUIS) 5 mg, oral, Every 12 hours  "   ascorbic acid (VITAMIN C) 500 mg, Daily    aspirin 81 mg, Daily    atorvastatin (LIPITOR) 20 mg, oral, Daily    Breztri Aerosphere 160-9-4.8 mcg/actuation HFA aerosol inhaler 2 puffs, 2 times daily    cyclobenzaprine (FLEXERIL) 5 mg, oral, 3 times daily    furosemide (LASIX) 20 mg, oral, Every other day    ibuprofen 600 mg, oral, Every 8 hours PRN    metoprolol succinate XL (TOPROL-XL) 12.5 mg, oral, Daily    olmesartan (BENICAR) 20 mg, Daily    spironolactone (ALDACTONE) 12.5 mg, oral, Daily         Last Recorded Vitals:      11/3/2024     4:16 AM 11/3/2024     6:47 AM 11/3/2024     8:20 AM 12/11/2024     1:36 PM 12/30/2024     7:42 AM 12/30/2024     9:36 PM 3/6/2025    10:47 AM   Vitals   Systolic 104  115 108 115 118 118   Diastolic 63  65 70 60 60 60   BP Location Right arm  Right arm  Left arm Right arm    Heart Rate 71  74 77 77 81    Temp 36.6 °C (97.9 °F)  36.8 °C (98.2 °F)  37.1 °C (98.8 °F) 36.4 °C (97.5 °F)    Resp 18  18 16 20 20    Height     1.727 m (5' 8\")  1.727 m (5' 8\")   Weight (lb)  147.49  150 143  143   BMI  23.1 kg/m2  23.49 kg/m2 21.74 kg/m2  21.74 kg/m2   BSA (m2)  1.78 m2  1.79 m2 1.76 m2  1.76 m2   Visit Report    Report       Visit Vitals  Smoking Status Former        Physical Exam:    General: Awake, alert/oriented x3, well developed, no acute distress  Head: Atraumatic/Normocephalic  Eyes: Normal external exam, EOMI, PERRLA  ENT: Oropharynx normal, moist mucous membranes  Cardiovascular: RRR, S1/S2, no murmurs, rubs, or gallops, radial pulses +2, no edema of extremities  Pulmonary: CTAB, no respiratory distress. No wheezes, rales, or ronchi  Abdomen: +BS, soft, non-tender, nondistended, no guarding or rebound  MSK: No joint swelling, normal movements of all extremities. Range of motion- normal.  Extremities: no edema, no cyanosis  Neuro: Alert/oriented x3, no focal motor or sensory deficits  Psychiatric: Judgment intact. Appropriate mood and behavior      I reviewed recent available " "cardiac studies.      Labs    Lab Results   Component Value Date    WBC 5.9 12/30/2024    HGB 12.8 (L) 12/30/2024    HCT 39.3 (L) 12/30/2024     (L) 12/30/2024    CHOL 108 08/29/2023    TRIG 51 08/29/2023    HDL 45.5 08/29/2023    ALT 24 12/30/2024    AST 46 (H) 12/30/2024     12/30/2024    K 4.6 12/30/2024     12/30/2024    CREATININE 1.27 12/30/2024    BUN 49 (H) 12/30/2024    CO2 22 12/30/2024    TSH 3.63 08/29/2023    INR 1.3 (H) 11/01/2024    HGBA1C 6.4 (H) 12/27/2024     No results found for: \"CKTOTAL\", \"CKMB\", \"CKMBINDEX\", \"TROPONINI\"     Lab Results   Component Value Date    INR 1.3 (H) 11/01/2024    INR 1.0 02/22/2023    INR 1.0 07/22/2022    PROTIME 14.5 (H) 11/01/2024    PROTIME 11.8 02/22/2023    PROTIME 11.9 07/22/2022             Assessment/Plan     Patient reports good overall good health. Endorses being moderately active for hid age.    Patient was still on triple therapy; so ASA was stopped and Atorvastatin was increased to 40mg.     Will follow up  in 1 year with labs           Nelson hSin MD, PhD, FACC, [facility]  Interventional Cardiology, [facility]  Associate Professor of Medicine, [facility]  Office: [phone]         **Disclaimer: This note was dictated by speech recognition, and every effort has been made to prevent any error in transcription, however minor errors may be present**    "

## 2025-04-09 ENCOUNTER — APPOINTMENT (OUTPATIENT)
Dept: OPHTHALMOLOGY | Facility: CLINIC | Age: OVER 89
End: 2025-04-09
Payer: MEDICARE

## 2025-04-09 DIAGNOSIS — H52.223 REGULAR ASTIGMATISM OF BOTH EYES: ICD-10-CM

## 2025-04-09 DIAGNOSIS — H52.13 MYOPIA, BILATERAL: ICD-10-CM

## 2025-04-09 DIAGNOSIS — H25.813 COMBINED FORMS OF AGE-RELATED CATARACT OF BOTH EYES: Primary | ICD-10-CM

## 2025-04-09 DIAGNOSIS — H52.4 PRESBYOPIA: ICD-10-CM

## 2025-04-09 DIAGNOSIS — H53.8 BLURRED VISION: ICD-10-CM

## 2025-04-09 PROCEDURE — 92015 DETERMINE REFRACTIVE STATE: CPT | Performed by: OPHTHALMOLOGY

## 2025-04-09 PROCEDURE — 92014 COMPRE OPH EXAM EST PT 1/>: CPT | Performed by: OPHTHALMOLOGY

## 2025-04-09 ASSESSMENT — REFRACTION_MANIFEST
OS_AXIS: 085
OD_AXIS: 091
OS_CYLINDER: -0.50
OD_CYLINDER: -1.25
OD_CYLINDER: -1.50
OD_ADD: +2.75
OD_SPHERE: -1.25
OS_ADD: +2.75
OS_CYLINDER: SPHERE
OD_AXIS: 091
OS_SPHERE: -2.00
OD_SPHERE: -1.25
OS_SPHERE: -2.50
METHOD_AUTOREFRACTION: 1

## 2025-04-09 ASSESSMENT — CONF VISUAL FIELD
OD_NORMAL: 1
OD_INFERIOR_TEMPORAL_RESTRICTION: 0
OS_INFERIOR_NASAL_RESTRICTION: 0
OD_SUPERIOR_TEMPORAL_RESTRICTION: 0
OS_SUPERIOR_NASAL_RESTRICTION: 0
OD_INFERIOR_NASAL_RESTRICTION: 0
OS_NORMAL: 1
OS_SUPERIOR_TEMPORAL_RESTRICTION: 0
OS_INFERIOR_TEMPORAL_RESTRICTION: 0
OD_SUPERIOR_NASAL_RESTRICTION: 0

## 2025-04-09 ASSESSMENT — VISUAL ACUITY
OS_CC: J1+
OS_BAT_MED: 20/60
OD_CC: J1+
OD_BAT_MED: 20/60
OD_CC: 20/20
OS_CC: 20/20-2
METHOD: SNELLEN - LINEAR
CORRECTION_TYPE: GLASSES

## 2025-04-09 ASSESSMENT — ENCOUNTER SYMPTOMS
GASTROINTESTINAL NEGATIVE: 0
ENDOCRINE NEGATIVE: 0
HEMATOLOGIC/LYMPHATIC NEGATIVE: 0
ALLERGIC/IMMUNOLOGIC NEGATIVE: 0
PSYCHIATRIC NEGATIVE: 0
NEUROLOGICAL NEGATIVE: 0
EYES NEGATIVE: 1
MUSCULOSKELETAL NEGATIVE: 0
CARDIOVASCULAR NEGATIVE: 0
RESPIRATORY NEGATIVE: 0
CONSTITUTIONAL NEGATIVE: 0

## 2025-04-09 ASSESSMENT — TONOMETRY
OS_IOP_MMHG: 14
IOP_METHOD: GOLDMANN APPLANATION
OD_IOP_MMHG: 14

## 2025-04-09 ASSESSMENT — REFRACTION_WEARINGRX
OD_SPHERE: -2.25
OS_AXIS: 085
OD_AXIS: 115
OS_ADD: +2.75
OS_SPHERE: -2.00
OD_CYLINDER: -0.75
OS_CYLINDER: -0.50
OD_ADD: +2.75

## 2025-04-09 ASSESSMENT — SLIT LAMP EXAM - LIDS
COMMENTS: GOOD POSITION
COMMENTS: GOOD POSITION

## 2025-04-09 ASSESSMENT — CUP TO DISC RATIO
OD_RATIO: .3
OS_RATIO: .3

## 2025-04-09 ASSESSMENT — EXTERNAL EXAM - RIGHT EYE: OD_EXAM: NORMAL

## 2025-04-09 ASSESSMENT — EXTERNAL EXAM - LEFT EYE: OS_EXAM: NORMAL

## 2025-04-09 NOTE — PROGRESS NOTES
Assessment/Plan   Diagnoses and all orders for this visit:  Combined forms of age-related cataract of both eyes  Not visually significant at the present time  continue to monitor    Blurred vision  Improved with refraction    Myopia, bilateral  Regular astigmatism of both eyes  Presbyopia  Refractive error  -give Rx for new glasses    Return for a dilated exam in  12  months or sooner if having any problems

## 2025-04-16 ENCOUNTER — TELEPHONE (OUTPATIENT)
Dept: CARDIOLOGY | Facility: CLINIC | Age: OVER 89
End: 2025-04-16
Payer: MEDICARE

## 2025-04-16 NOTE — TELEPHONE ENCOUNTER
Patient calling because he misplaced his booklet on heart failure and would like another.  He said that it was a white booklet with information on medications, exercise, seasonings, etc....  Do we have any?

## 2025-04-18 ENCOUNTER — TELEPHONE (OUTPATIENT)
Dept: CARDIOLOGY | Facility: CLINIC | Age: OVER 89
End: 2025-04-18
Payer: MEDICARE

## 2025-04-25 ENCOUNTER — TELEPHONE (OUTPATIENT)
Dept: CARDIOLOGY | Facility: CLINIC | Age: OVER 89
End: 2025-04-25
Payer: MEDICARE

## 2025-04-25 NOTE — TELEPHONE ENCOUNTER
I called the patient and he is not supposed to be taking the plavix. Dr. Shin discontinued his plavix at his last visit 4/7 and started him on eliquis and baby asa for afib. I called the patient and he said he had been taking both an was a little confused on his meds. I went over everything and he will remove the plavix from his daily pill container.

## 2025-04-25 NOTE — TELEPHONE ENCOUNTER
Patrick' urologist is questioning why he is taking Plavix and eliquis. With his history or nose bleeds and blood in the urine he is concerned this might be to much for the patient. Please advise.

## 2025-04-30 ENCOUNTER — HOSPITAL ENCOUNTER (INPATIENT)
Facility: HOSPITAL | Age: OVER 89
End: 2025-04-30
Attending: EMERGENCY MEDICINE | Admitting: INTERNAL MEDICINE
Payer: MEDICARE

## 2025-04-30 DIAGNOSIS — R07.9 CHEST PAIN, UNSPECIFIED TYPE: ICD-10-CM

## 2025-04-30 DIAGNOSIS — R31.9 HEMATURIA, UNSPECIFIED TYPE: ICD-10-CM

## 2025-04-30 DIAGNOSIS — R33.9 URINARY RETENTION: Primary | ICD-10-CM

## 2025-04-30 DIAGNOSIS — I10 ESSENTIAL (PRIMARY) HYPERTENSION: ICD-10-CM

## 2025-04-30 LAB
APPEARANCE UR: ABNORMAL
BILIRUB UR STRIP.AUTO-MCNC: NEGATIVE MG/DL
COLOR UR: ABNORMAL
GLUCOSE UR STRIP.AUTO-MCNC: NORMAL MG/DL
KETONES UR STRIP.AUTO-MCNC: NEGATIVE MG/DL
LEUKOCYTE ESTERASE UR QL STRIP.AUTO: ABNORMAL
NITRITE UR QL STRIP.AUTO: NEGATIVE
PH UR STRIP.AUTO: 7 [PH]
PROT UR STRIP.AUTO-MCNC: ABNORMAL MG/DL
RBC # UR STRIP.AUTO: ABNORMAL MG/DL
RBC #/AREA URNS AUTO: >20 /HPF
SP GR UR STRIP.AUTO: 1.02
UROBILINOGEN UR STRIP.AUTO-MCNC: NORMAL MG/DL
WBC #/AREA URNS AUTO: ABNORMAL /HPF

## 2025-04-30 PROCEDURE — 51798 US URINE CAPACITY MEASURE: CPT

## 2025-04-30 PROCEDURE — 51702 INSERT TEMP BLADDER CATH: CPT

## 2025-04-30 PROCEDURE — 81001 URINALYSIS AUTO W/SCOPE: CPT | Performed by: EMERGENCY MEDICINE

## 2025-04-30 PROCEDURE — 87086 URINE CULTURE/COLONY COUNT: CPT | Mod: PARLAB | Performed by: EMERGENCY MEDICINE

## 2025-04-30 PROCEDURE — 99285 EMERGENCY DEPT VISIT HI MDM: CPT | Performed by: EMERGENCY MEDICINE

## 2025-04-30 RX ORDER — CEPHALEXIN 500 MG/1
500 CAPSULE ORAL 4 TIMES DAILY
Qty: 40 CAPSULE | Refills: 0 | Status: SHIPPED | OUTPATIENT
Start: 2025-04-30 | End: 2025-05-10

## 2025-04-30 RX ORDER — SPIRONOLACTONE 25 MG/1
12.5 TABLET ORAL DAILY
Qty: 45 TABLET | Refills: 3 | Status: ON HOLD | OUTPATIENT
Start: 2025-04-30

## 2025-05-01 ENCOUNTER — APPOINTMENT (OUTPATIENT)
Dept: UROLOGY | Facility: CLINIC | Age: OVER 89
End: 2025-05-01
Payer: MEDICARE

## 2025-05-01 PROBLEM — D64.9 ANEMIA: Status: ACTIVE | Noted: 2025-05-01

## 2025-05-01 PROBLEM — R33.9 URINARY RETENTION: Status: ACTIVE | Noted: 2025-05-01

## 2025-05-01 PROBLEM — N17.9 AKI (ACUTE KIDNEY INJURY): Status: ACTIVE | Noted: 2025-05-01

## 2025-05-01 PROBLEM — R31.9 HEMATURIA: Status: ACTIVE | Noted: 2025-05-01

## 2025-05-01 LAB
ANION GAP SERPL CALC-SCNC: 14 MMOL/L (ref 10–20)
APTT PPP: 29 SECONDS (ref 26–36)
BUN SERPL-MCNC: 43 MG/DL (ref 6–23)
CALCIUM SERPL-MCNC: 9.6 MG/DL (ref 8.6–10.3)
CHLORIDE SERPL-SCNC: 104 MMOL/L (ref 98–107)
CO2 SERPL-SCNC: 24 MMOL/L (ref 21–32)
CREAT SERPL-MCNC: 1.45 MG/DL (ref 0.5–1.3)
EGFRCR SERPLBLD CKD-EPI 2021: 46 ML/MIN/1.73M*2
ERYTHROCYTE [DISTWIDTH] IN BLOOD BY AUTOMATED COUNT: 13.7 % (ref 11.5–14.5)
GLUCOSE SERPL-MCNC: 106 MG/DL (ref 74–99)
HCT VFR BLD AUTO: 33.4 % (ref 41–52)
HGB BLD-MCNC: 11 G/DL (ref 13.5–17.5)
HOLD SPECIMEN: NORMAL
INR PPP: 1.6 (ref 0.9–1.1)
MCH RBC QN AUTO: 33.5 PG (ref 26–34)
MCHC RBC AUTO-ENTMCNC: 32.9 G/DL (ref 32–36)
MCV RBC AUTO: 102 FL (ref 80–100)
NRBC BLD-RTO: 0 /100 WBCS (ref 0–0)
PLATELET # BLD AUTO: 138 X10*3/UL (ref 150–450)
POTASSIUM SERPL-SCNC: 5.2 MMOL/L (ref 3.5–5.3)
PROTHROMBIN TIME: 17.2 SECONDS (ref 9.8–12.4)
RBC # BLD AUTO: 3.28 X10*6/UL (ref 4.5–5.9)
SODIUM SERPL-SCNC: 137 MMOL/L (ref 136–145)
WBC # BLD AUTO: 6.3 X10*3/UL (ref 4.4–11.3)

## 2025-05-01 PROCEDURE — 2500000001 HC RX 250 WO HCPCS SELF ADMINISTERED DRUGS (ALT 637 FOR MEDICARE OP): Performed by: NURSE PRACTITIONER

## 2025-05-01 PROCEDURE — 2500000004 HC RX 250 GENERAL PHARMACY W/ HCPCS (ALT 636 FOR OP/ED): Mod: JZ | Performed by: NURSE PRACTITIONER

## 2025-05-01 PROCEDURE — 85610 PROTHROMBIN TIME: CPT | Performed by: NURSE PRACTITIONER

## 2025-05-01 PROCEDURE — 1200000002 HC GENERAL ROOM WITH TELEMETRY DAILY

## 2025-05-01 PROCEDURE — 36415 COLL VENOUS BLD VENIPUNCTURE: CPT | Performed by: NURSE PRACTITIONER

## 2025-05-01 PROCEDURE — 99222 1ST HOSP IP/OBS MODERATE 55: CPT | Performed by: NURSE PRACTITIONER

## 2025-05-01 PROCEDURE — 82374 ASSAY BLOOD CARBON DIOXIDE: CPT | Performed by: NURSE PRACTITIONER

## 2025-05-01 PROCEDURE — 85027 COMPLETE CBC AUTOMATED: CPT | Performed by: NURSE PRACTITIONER

## 2025-05-01 RX ORDER — ALBUTEROL SULFATE 90 UG/1
2 INHALANT RESPIRATORY (INHALATION) EVERY 6 HOURS PRN
Status: DISCONTINUED | OUTPATIENT
Start: 2025-05-01 | End: 2025-05-04

## 2025-05-01 RX ORDER — FUROSEMIDE 20 MG/1
20 TABLET ORAL EVERY OTHER DAY
Status: ACTIVE | OUTPATIENT
Start: 2025-05-02

## 2025-05-01 RX ORDER — FLUTICASONE FUROATE AND VILANTEROL 200; 25 UG/1; UG/1
1 POWDER RESPIRATORY (INHALATION)
Status: DISPENSED | OUTPATIENT
Start: 2025-05-01

## 2025-05-01 RX ORDER — SPIRONOLACTONE 25 MG/1
12.5 TABLET ORAL DAILY
Status: ACTIVE | OUTPATIENT
Start: 2025-05-01

## 2025-05-01 RX ORDER — ACETAMINOPHEN 325 MG/1
650 TABLET ORAL EVERY 4 HOURS PRN
Status: DISPENSED | OUTPATIENT
Start: 2025-05-01

## 2025-05-01 RX ORDER — ATORVASTATIN CALCIUM 40 MG/1
40 TABLET, FILM COATED ORAL DAILY
Status: DISPENSED | OUTPATIENT
Start: 2025-05-01

## 2025-05-01 RX ORDER — MORPHINE SULFATE 2 MG/ML
2 INJECTION, SOLUTION INTRAMUSCULAR; INTRAVENOUS ONCE
Status: COMPLETED | OUTPATIENT
Start: 2025-05-01 | End: 2025-05-01

## 2025-05-01 RX ADMIN — ACETAMINOPHEN 650 MG: 325 TABLET, FILM COATED ORAL at 09:00

## 2025-05-01 RX ADMIN — MORPHINE SULFATE 2 MG: 2 INJECTION, SOLUTION INTRAMUSCULAR; INTRAVENOUS at 07:04

## 2025-05-01 RX ADMIN — ATORVASTATIN CALCIUM 40 MG: 40 TABLET, FILM COATED ORAL at 09:00

## 2025-05-01 SDOH — SOCIAL STABILITY: SOCIAL INSECURITY: ABUSE: ADULT

## 2025-05-01 SDOH — SOCIAL STABILITY: SOCIAL INSECURITY: DOES ANYONE TRY TO KEEP YOU FROM HAVING/CONTACTING OTHER FRIENDS OR DOING THINGS OUTSIDE YOUR HOME?: NO

## 2025-05-01 SDOH — SOCIAL STABILITY: SOCIAL INSECURITY: DO YOU FEEL ANYONE HAS EXPLOITED OR TAKEN ADVANTAGE OF YOU FINANCIALLY OR OF YOUR PERSONAL PROPERTY?: NO

## 2025-05-01 SDOH — SOCIAL STABILITY: SOCIAL INSECURITY: HAS ANYONE EVER THREATENED TO HURT YOUR FAMILY OR YOUR PETS?: NO

## 2025-05-01 SDOH — SOCIAL STABILITY: SOCIAL INSECURITY: HAVE YOU HAD ANY THOUGHTS OF HARMING ANYONE ELSE?: NO

## 2025-05-01 SDOH — SOCIAL STABILITY: SOCIAL INSECURITY: DO YOU FEEL UNSAFE GOING BACK TO THE PLACE WHERE YOU ARE LIVING?: NO

## 2025-05-01 SDOH — SOCIAL STABILITY: SOCIAL INSECURITY: ARE YOU OR HAVE YOU BEEN THREATENED OR ABUSED PHYSICALLY, EMOTIONALLY, OR SEXUALLY BY ANYONE?: NO

## 2025-05-01 SDOH — SOCIAL STABILITY: SOCIAL INSECURITY: ARE THERE ANY APPARENT SIGNS OF INJURIES/BEHAVIORS THAT COULD BE RELATED TO ABUSE/NEGLECT?: NO

## 2025-05-01 SDOH — SOCIAL STABILITY: SOCIAL INSECURITY: WERE YOU ABLE TO COMPLETE ALL THE BEHAVIORAL HEALTH SCREENINGS?: YES

## 2025-05-01 SDOH — SOCIAL STABILITY: SOCIAL INSECURITY: HAVE YOU HAD THOUGHTS OF HARMING ANYONE ELSE?: NO

## 2025-05-01 ASSESSMENT — LIFESTYLE VARIABLES
HOW OFTEN DO YOU HAVE A DRINK CONTAINING ALCOHOL: NEVER
HOW OFTEN DO YOU HAVE 6 OR MORE DRINKS ON ONE OCCASION: NEVER
EVER HAD A DRINK FIRST THING IN THE MORNING TO STEADY YOUR NERVES TO GET RID OF A HANGOVER: NO
HOW MANY STANDARD DRINKS CONTAINING ALCOHOL DO YOU HAVE ON A TYPICAL DAY: PATIENT DOES NOT DRINK
TOTAL SCORE: 0
AUDIT-C TOTAL SCORE: 0
SKIP TO QUESTIONS 9-10: 1
HAVE YOU EVER FELT YOU SHOULD CUT DOWN ON YOUR DRINKING: NO
AUDIT-C TOTAL SCORE: 0
HAVE PEOPLE ANNOYED YOU BY CRITICIZING YOUR DRINKING: NO
EVER FELT BAD OR GUILTY ABOUT YOUR DRINKING: NO

## 2025-05-01 ASSESSMENT — COGNITIVE AND FUNCTIONAL STATUS - GENERAL
PATIENT BASELINE BEDBOUND: NO
DAILY ACTIVITIY SCORE: 23
TOILETING: A LITTLE
TOILETING: A LITTLE
DAILY ACTIVITIY SCORE: 23
WALKING IN HOSPITAL ROOM: A LITTLE
CLIMB 3 TO 5 STEPS WITH RAILING: A LITTLE
WALKING IN HOSPITAL ROOM: A LITTLE
CLIMB 3 TO 5 STEPS WITH RAILING: A LITTLE
CLIMB 3 TO 5 STEPS WITH RAILING: A LITTLE
WALKING IN HOSPITAL ROOM: A LITTLE
DAILY ACTIVITIY SCORE: 23
TOILETING: A LITTLE
STANDING UP FROM CHAIR USING ARMS: A LITTLE
MOVING TO AND FROM BED TO CHAIR: A LITTLE
MOBILITY SCORE: 20
MOBILITY SCORE: 22
MOBILITY SCORE: 22

## 2025-05-01 ASSESSMENT — PATIENT HEALTH QUESTIONNAIRE - PHQ9
1. LITTLE INTEREST OR PLEASURE IN DOING THINGS: NOT AT ALL
2. FEELING DOWN, DEPRESSED OR HOPELESS: SEVERAL DAYS
SUM OF ALL RESPONSES TO PHQ9 QUESTIONS 1 & 2: 1

## 2025-05-01 ASSESSMENT — PAIN SCALES - GENERAL
PAINLEVEL_OUTOF10: 10 - WORST POSSIBLE PAIN
PAINLEVEL_OUTOF10: 0 - NO PAIN
PAINLEVEL_OUTOF10: 0 - NO PAIN

## 2025-05-01 ASSESSMENT — ENCOUNTER SYMPTOMS
SHORTNESS OF BREATH: 1
WEAKNESS: 1
DIFFICULTY URINATING: 1

## 2025-05-01 ASSESSMENT — ACTIVITIES OF DAILY LIVING (ADL)
HEARING - RIGHT EAR: DIFFICULTY WITH NOISE
HEARING - LEFT EAR: DIFFICULTY WITH NOISE
ADEQUATE_TO_COMPLETE_ADL: YES
ASSISTIVE_DEVICE: EYEGLASSES
WALKS IN HOME: INDEPENDENT
DRESSING YOURSELF: INDEPENDENT
JUDGMENT_ADEQUATE_SAFELY_COMPLETE_DAILY_ACTIVITIES: YES
GROOMING: INDEPENDENT
FEEDING YOURSELF: INDEPENDENT
TOILETING: INDEPENDENT
BATHING: INDEPENDENT
PATIENT'S MEMORY ADEQUATE TO SAFELY COMPLETE DAILY ACTIVITIES?: YES

## 2025-05-01 ASSESSMENT — PAIN DESCRIPTION - LOCATION: LOCATION: OTHER (COMMENT)

## 2025-05-01 ASSESSMENT — PAIN - FUNCTIONAL ASSESSMENT: PAIN_FUNCTIONAL_ASSESSMENT: 0-10

## 2025-05-01 NOTE — H&P
History Of Present Illness  Patrick Vázquez is a 89 y.o. male with past medical history significant for prostate cancer, hypertension, heart failure, COPD, diverticulosis, NSTEMI (2022) with stent placement, mitral regurgitation s/p MitraClip presenting to emergency department for evaluation of urinary retention.  Patient states that his urologist placed a Zarco catheter approximately 1 week ago due to urinary retention.  He states that his Zarco catheter was removed around noon and since then he had been unable to urinate.  Patient states he developed lower abdominal pain.  Patient denies nausea, vomiting, diarrhea, recent illness.  Patient denies chest pain or dizziness.  Patient states he is currently anticoagulated on Plavix, baby aspirin, and Eliquis due to a mixup in his medications.  Patient was removed from his Plavix on 4/7/2025 by his cardiologist Dr. Shin, however the patient states that he never actually stopped taking the Plavix.  He called the cardiologist office on 4/25/2025 when his urologist questioned why he was on Plavix and Eliquis.  Patient stated at that time he would stop taking the Plavix however he now states that he is confused and is unsure if he actually stopped taking it.    In ED, a bladder scan was completed and revealed 440 mL of urine.  A Zarco catheter was inserted and the patient was initially going to be discharged home on Keflex prophylactically to follow-up with his urologist.  Upon discharging the patient he developed jeremiah hematuria which clogged his Zarco catheter.  Patient is currently requiring continuous bladder irrigation.  Basic labs obtained and ordered by me.  PT 17.2, INR 1.6, hemoglobin 11.0, hematocrit 33.4, platelets 138, glucose 106, BUN 43, creatinine 1.45, GFR 46.  Urinalysis showing dark brown turbid urine with 3+ blood and 75 leukocytes, no WBCs.  Consult placed to urology.  Blood pressure currently 119/56, heart rate 66, respirations 18, temperature 36.6  "°C, SpO2 96% on room air.  Patient is a DNR/DNI.  Has pitting edema on his bilateral lower ankles, history of heart failure.  No IV fluids given.  Echocardiogram 3/6/2025 with an EF of 50 to 55%, mild AVR.  Patient will be admitted to telemetry under the care of Dr. Stevens who will continue to follow.  I was asked to do H&P and place initial admission orders.  PCP Dr. Sean Godwin.    Prior medical records reviewed by me.     Past Medical History  Prostate cancer, hypertension, CHF, cataract, COPD, diverticulosis, DJD, NSTEMI 2022, mitral regurgitation s/p MitraClip  Surgical History  MitraClip, hip replacement, prostatectomy, cardiac stent placement, cardiac catheterization     Social History  Former smoker, no drug use, social alcohol use, , lives at home with his wife  Family History  Reviewed and noncontributory     Allergies  Bee venom protein (honey bee)    Review of Systems  A 10 point review of systems was completed and is negative except what is listed in HPI  Physical Exam  Constitutional:       Appearance: Normal appearance.   HENT:      Mouth/Throat:      Mouth: Mucous membranes are dry.      Pharynx: Oropharynx is clear.   Eyes:      Pupils: Pupils are equal, round, and reactive to light.   Cardiovascular:      Rate and Rhythm: Normal rate and regular rhythm.   Pulmonary:      Effort: Pulmonary effort is normal.      Breath sounds: Normal breath sounds.   Abdominal:      General: Bowel sounds are normal.      Palpations: Abdomen is soft.   Genitourinary:     Comments: Zarco catheter  Musculoskeletal:         General: Normal range of motion.      Cervical back: Normal range of motion.      Comments: \"Sock line\" pitting edema B/L ankles   Skin:     General: Skin is warm and dry.      Capillary Refill: Capillary refill takes less than 2 seconds.   Neurological:      General: No focal deficit present.      Mental Status: He is alert and oriented to person, place, and time.          Last Recorded " Vitals  Blood pressure 119/56, pulse 66, temperature 36.6 °C (97.9 °F), resp. rate (!) 22, weight 70.3 kg (155 lb), SpO2 (!) 89%.    Relevant Results  No results found.  Results for orders placed or performed during the hospital encounter of 04/30/25 (from the past 24 hours)   Urinalysis with Reflex Culture and Microscopic   Result Value Ref Range    Color, Urine Dark-Brown (N) Light-Yellow, Yellow, Dark-Yellow    Appearance, Urine Ex.Turbid (N) Clear    Specific Gravity, Urine 1.019 1.005 - 1.035    pH, Urine 7.0 5.0, 5.5, 6.0, 6.5, 7.0, 7.5, 8.0    Protein, Urine 300 (3+) (A) NEGATIVE, 10 (TRACE), 20 (TRACE) mg/dL    Glucose, Urine Normal Normal mg/dL    Blood, Urine OVER (3+) (A) NEGATIVE mg/dL    Ketones, Urine NEGATIVE NEGATIVE mg/dL    Bilirubin, Urine NEGATIVE NEGATIVE mg/dL    Urobilinogen, Urine Normal Normal mg/dL    Nitrite, Urine NEGATIVE NEGATIVE    Leukocyte Esterase, Urine 75 Connie/uL (A) NEGATIVE   Microscopic Only, Urine   Result Value Ref Range    WBC, Urine 1-5 1-5, NONE /HPF    RBC, Urine >20 (A) NONE, 1-2, 3-5 /HPF   Coagulation Screen   Result Value Ref Range    Protime 17.2 (H) 9.8 - 12.4 seconds    INR 1.6 (H) 0.9 - 1.1    aPTT 29 26 - 36 seconds   CBC   Result Value Ref Range    WBC 6.3 4.4 - 11.3 x10*3/uL    nRBC 0.0 0.0 - 0.0 /100 WBCs    RBC 3.28 (L) 4.50 - 5.90 x10*6/uL    Hemoglobin 11.0 (L) 13.5 - 17.5 g/dL    Hematocrit 33.4 (L) 41.0 - 52.0 %     (H) 80 - 100 fL    MCH 33.5 26.0 - 34.0 pg    MCHC 32.9 32.0 - 36.0 g/dL    RDW 13.7 11.5 - 14.5 %    Platelets 138 (L) 150 - 450 x10*3/uL   Basic metabolic panel   Result Value Ref Range    Glucose 106 (H) 74 - 99 mg/dL    Sodium 137 136 - 145 mmol/L    Potassium 5.2 3.5 - 5.3 mmol/L    Chloride 104 98 - 107 mmol/L    Bicarbonate 24 21 - 32 mmol/L    Anion Gap 14 10 - 20 mmol/L    Urea Nitrogen 43 (H) 6 - 23 mg/dL    Creatinine 1.45 (H) 0.50 - 1.30 mg/dL    eGFR 46 (L) >60 mL/min/1.73m*2    Calcium 9.6 8.6 - 10.3 mg/dL       Assessment  & Plan  Urinary retention    DOYLE (acute kidney injury)    Hematuria    Anemia      Patrick is a 89-year-old male patient presented to emergency department for evaluation of urinary retention.  Patient with a history of prostate cancer states he had a Zarco catheter placed 1 week ago and was removed at approximately noon today.  Patient states that since then he has been unable to urinate and developed lower abdominal pain.  Patient with a bladder scan of 440 in ED.  Had a Zarco catheter placed and was initially going to be discharged home on prophylactic Keflex with follow-up to his urologist.  Patient developed jeremiah hematuria which clogged his Zarco catheter requiring continuous bladder irrigation.  Patient also has a medication mixup in which she has been taking Plavix, Eliquis, and baby aspirin daily.  Per medical records patient was removed from his Plavix on 4/7/2025 by his cardiologist.  There was a telephone conversation in which this was cleared up again on 4/25/2025 and the patient states he would take that Plavix out of his pillbox but he is unsure whether he did.  Consult placed to urology.  Labs obtained, patient found to have an DOYLE and anemia.  Vital signs within normal limits.  Patient admitted for further medical management.    Urinary retention/DOYLE/hematuria/anemia  Inpatient telemetry admission to Dr. Hilda Zarco/continuous bladder irrigation  Consult urology and appreciate input  Hold home anticoagulation including Eliquis, baby aspirin  Patient requiring medication education  Trend hemoglobin  No IV fluids for DOYLE 2/2 history of CHF  Hold home furosemide/Aldactone 2/2 DOYLE  Sock pitting edema noted to bilateral lower extremities  Cardiac/low-sodium diet  Tylenol as needed    Prostate cancer/HTN/CHF/COPD/diverticulosis/CAD/DJD  #Chronic conditions  Continue home atorvastatin 40 mg daily  Continue home inhalers  Hold home Eliquis, baby aspirin, Lasix, Aldactone  Attending to restart medications as  appropriate  Echocardiogram on file 3/6/2025: EF 50 to 55%, mild AVR    DVT Ppx  SCDs  No chemical prophylaxis 2/2 hematuria  Up to chair with assistance      I spent 50 minutes in the professional and overall care of this patient.  Michell Rosario, BONNIE-CNP

## 2025-05-01 NOTE — CARE PLAN
The patient's goals for the shift include      The clinical goals for the shift include Continue bladder irrigations as ordered, maintain safety and comfort      Problem: Pain - Adult  Goal: Verbalizes/displays adequate comfort level or baseline comfort level  Outcome: Progressing  Flowsheets (Taken 5/1/2025 0549)  Verbalizes/displays adequate comfort level or baseline comfort level:   Encourage patient to monitor pain and request assistance   Assess pain using appropriate pain scale   Consider cultural and social influences on pain and pain management     Problem: Safety - Adult  Goal: Free from fall injury  Outcome: Progressing  Flowsheets (Taken 5/1/2025 0549)  Free from fall injury: Instruct family/caregiver on patient safety

## 2025-05-01 NOTE — ED NOTES
Flow issues reported.   Flushed w/o return flow.   Bleeding noted around meatus.   MD notified     Javid Moya RN  04/30/25 [id number]

## 2025-05-01 NOTE — DISCHARGE INSTRUCTIONS
Please follow-up with your primary care provider as well as urology team for catheter removal.  Take Keflex prophylactically.  Return to the emergency department if develop any recurrent urinary retention, blockage in the Zarco catheter, back pain, abdominal pain, fever, or if concerns arise

## 2025-05-01 NOTE — ED NOTES
Pt reports issue with folley.  No recent output noted but bleeding around the cath and out of the penis noted.   Zarco irrigated and and return flow noted.     Javid Moya RN  04/30/25 [id number]

## 2025-05-01 NOTE — CARE PLAN
The patient's goals for the shift include      The clinical goals for the shift include Continue bladder irrigations as ordered, maintain safety and comfort      Problem: Pain - Adult  Goal: Verbalizes/displays adequate comfort level or baseline comfort level  Outcome: Progressing     Problem: Safety - Adult  Goal: Free from fall injury  Outcome: Progressing     Problem: Discharge Planning  Goal: Discharge to home or other facility with appropriate resources  Outcome: Progressing     Problem: Chronic Conditions and Co-morbidities  Goal: Patient's chronic conditions and co-morbidity symptoms are monitored and maintained or improved  Outcome: Progressing     Problem: Nutrition  Goal: Nutrient intake appropriate for maintaining nutritional needs  Outcome: Progressing

## 2025-05-01 NOTE — ED PROVIDER NOTES
HPI   Chief Complaint   Patient presents with   • Urinary Retention     Had paz removed this am, has not urinated since.       This is a 89 years old male patient presented to the emergency department with a chief complaint of urinary retention.  Stated that today around noon he removed his Paz catheter and he could not pass urine since then.  Denies any flank pain, abdominal pain, nausea, vomiting, fever, chills, body aches, chest pain.    Review of system: As stated above in the HPI section.              Patient History   Medical History[1]  Surgical History[2]  Family History[3]  Social History[4]    Physical Exam   ED Triage Vitals [04/30/25 2024]   Temperature Heart Rate Respirations BP   36.6 °C (97.9 °F) 83 (!) 22 119/56      Pulse Ox Temp src Heart Rate Source Patient Position   99 % -- -- --      BP Location FiO2 (%)     Right arm --       Physical Exam  Vitals and nursing note reviewed.   Constitutional:       General: He is not in acute distress.     Appearance: He is well-developed.   HENT:      Head: Normocephalic and atraumatic.   Eyes:      Conjunctiva/sclera: Conjunctivae normal.   Cardiovascular:      Rate and Rhythm: Normal rate and regular rhythm.      Heart sounds: No murmur heard.  Pulmonary:      Effort: Pulmonary effort is normal. No respiratory distress.      Breath sounds: Normal breath sounds.   Abdominal:      Palpations: Abdomen is soft.      Tenderness: There is no abdominal tenderness.      Comments: Discomfort over the suprapubic region on the palpation   Musculoskeletal:         General: No swelling.      Cervical back: Neck supple.   Skin:     General: Skin is warm and dry.      Capillary Refill: Capillary refill takes less than 2 seconds.   Neurological:      Mental Status: He is alert.   Psychiatric:         Mood and Affect: Mood normal.           ED Course & MDM   Diagnoses as of 04/30/25 4558   Urinary retention   Hematuria, unspecified type                 No data recorded      [place] Coma Scale Score: 15 (04/30/25 2027 : Javid Moya RN)                           Medical Decision Making  Patient seen and examined, presentation is concerning for urinary tension.  Bladder scan revealed 440 mL of urine.  Zarco catheter was inserted.  We will be able to discharge the patient on Keflex prophylactically to follow-up with urology team and to return to the emergency department if alarming symptoms arise as per discharge instruction.    Upon discharging the patient the patient started to develop jeremiah hematuria, he clogged his Zarco catheter.  He required continuous bladder irrigation.  We will not be able to discharge the patient and we will admit the patient to medicine team.  Patient is accepted to medicine team for continuous bladder irrigation and likely urology consult.        Procedure  Procedures       Saúl Larsen DO  04/30/25 2117           [1]  Past Medical History:  Diagnosis Date   • Cataract    • CHF (congestive heart failure)    • Hypertension    • Personal history of malignant neoplasm of prostate     History of malignant neoplasm of prostate   • Personal history of other malignant neoplasm of skin     History of malignant neoplasm of skin   [2]  Past Surgical History:  Procedure Laterality Date   • MR HEAD ANGIO WO IV CONTRAST  2/22/2023    MR HEAD ANGIO WO IV CONTRAST PAR MRI   • MR NECK ANGIO WO IV CONTRAST  2/22/2023    MR NECK ANGIO WO IV CONTRAST PAR MRI   • OTHER SURGICAL HISTORY  05/11/2022    Cyst excision   • OTHER SURGICAL HISTORY  05/11/2022    Coronary artery stent placement   • OTHER SURGICAL HISTORY  05/17/2022    Radical prostatectomy   • OTHER SURGICAL HISTORY  05/17/2022    Cardiac catheterization   • OTHER SURGICAL HISTORY  05/17/2022    Hip replacement   • OTHER SURGICAL HISTORY  05/17/2022    Hip replacement   [3]  Family History  Problem Relation Name Age of Onset   • Breast cancer Daughter     • Breast cancer Maternal Grandmother     [4]  Social  History  Tobacco Use   • Smoking status: Former     Types: Cigarettes   • Smokeless tobacco: Never   Vaping Use   • Vaping status: Never Used   Substance Use Topics   • Alcohol use: Yes     Comment: SOCIALLY   • Drug use: Never      Saúl Larsen DO  04/30/25 [id number]

## 2025-05-01 NOTE — CONSULTS
Reason For Consult  Urinary retention with gross hematuria    History Of Present Illness  Patrick Vázquez is a 89 y.o. male presenting with past medical history significant for prostate cancer, hypertension, heart failure, COPD, diverticulosis, NSTEMI (2022) with stent placement, mitral regurgitation s/p MitraClip presenting to emergency department for evaluation of urinary retention.  After he drained over 600 cc of clear urine the urine turned bloody a three-way Zarco catheter was then placed however it clogged several times.  He is currently in discomfort.     Past Medical History  He has a past medical history of Cataract, CHF (congestive heart failure), Hypertension, Personal history of malignant neoplasm of prostate, and Personal history of other malignant neoplasm of skin.    Surgical History  He has a past surgical history that includes Other surgical history (05/11/2022); Other surgical history (05/11/2022); Other surgical history (05/17/2022); Other surgical history (05/17/2022); Other surgical history (05/17/2022); Other surgical history (05/17/2022); MR angio head wo IV contrast (2/22/2023); and MR angio neck wo IV contrast (2/22/2023).     Social History  He reports that he has quit smoking. His smoking use included cigarettes. He has never used smokeless tobacco. He reports current alcohol use. He reports that he does not use drugs.    Family History  Family History[1]     Allergies  Bee venom protein (honey bee)    Review of Systems  Reviewed the H&P has been no change     Physical Exam  Alert and oriented x 3 no acute distress however showing discomfort in the suprapubic area.  I deflated the balloon and the catheter and his pain immediately resolved yielding that the Zarco was in his prostatic urethra.     Last Recorded Vitals  Blood pressure 112/61, pulse 60, temperature 36.4 °C (97.5 °F), resp. rate (!) 22, weight 70.3 kg (155 lb), SpO2 (!) 89%.    Relevant Results      Procedure note with the  patient in the supine position I deflated the three-way catheter balloon and further inserted into the bladder with immediate urine output I reinflated the balloon and he is tolerating that more comfortably.  The catheter was then not irrigating with continuous bladder irrigation therefore I manually irrigated and removed about 200 cc of old clots until his urine is clear reconnected the continuous bladder irrigation to the catheter.  His urine is clear at this time.     Assessment/Plan     89-year-old man with acute urinary retention and gross hematuria.  - Continue the continuous bladder irrigation and titrate until off as long as the urine remains clear.  - Manually irrigate as needed to keep the urine clear.  - After I manually irrigated his urine is clear and we will continue to monitor him most likely be able to have the irrigation off completely later tonight or by tomorrow should the urine remains clear.  - Will maintain the Zarco catheter for at least 1 to 2 weeks and have him follow-up as an outpatient for trial of void.    Thank you for allowing us to participate in his care    I spent 45 minutes in the professional and overall care of this patient.      Chaitanya Mckeon PA-C         [1]   Family History  Problem Relation Name Age of Onset    Breast cancer Daughter      Breast cancer Maternal Grandmother

## 2025-05-01 NOTE — H&P
History Of Present Illness  Patrick Vázquez is a 89 y.o. male with past medical history significant for prostate cancer, hypertension, heart failure, COPD, diverticulosis, NSTEMI (2022) with stent placement, mitral regurgitation s/p MitraClip presenting to emergency department for evaluation of urinary retention.  Patient states that his urologist placed a Zarco catheter approximately 1 week ago due to urinary retention.  He states that his Zarco catheter was removed around noon and since then he had been unable to urinate.  Patient states he developed lower abdominal pain.  Patient denies nausea, vomiting, diarrhea, recent illness.  Patient denies chest pain or dizziness.  Patient states he is currently anticoagulated on Plavix, baby aspirin, and Eliquis due to a mixup in his medications.  Patient was removed from his Plavix on 4/7/2025 by his cardiologist Dr. Shin, however the patient states that he never actually stopped taking the Plavix.  He called the cardiologist office on 4/25/2025 when his urologist questioned why he was on Plavix and Eliquis.  Patient stated at that time he would stop taking the Plavix however he now states that he is confused and is unsure if he actually stopped taking it.    In ED, a bladder scan was completed and revealed 440 mL of urine.  A Zarco catheter was inserted and the patient was initially going to be discharged home on Keflex prophylactically to follow-up with his urologist.  Upon discharging the patient he developed jeremiah hematuria which clogged his Zarco catheter.  Patient is currently requiring continuous bladder irrigation.  Basic labs obtained and ordered by me.  PT 17.2, INR 1.6, hemoglobin 11.0, hematocrit 33.4, platelets 138, glucose 106, BUN 43, creatinine 1.45, GFR 46.  Urinalysis showing dark brown turbid urine with 3+ blood and 75 leukocytes, no WBCs.  Consult placed to urology.  Blood pressure currently 119/56, heart rate 66, respirations 18, temperature 36.6  °C, SpO2 96% on room air.  Patient is a DNR/DNI.  Has pitting edema on his bilateral lower ankles, history of heart failure.  No IV fluids given.  Echocardiogram 3/6/2025 with an EF of 50 to 55%, mild AVR.  Patient will be admitted to telemetry under the care of Dr. Stevens who will continue to follow.  I was asked to do H&P and place initial admission orders.  PCP Dr. Sean Godwin.    Prior medical records reviewed by me.     Past Medical History  Prostate cancer, hypertension, CHF, cataract, COPD, diverticulosis, DJD, NSTEMI 2022, mitral regurgitation s/p MitraClip  Surgical History  MitraClip, hip replacement, prostatectomy, cardiac stent placement, cardiac catheterization     Social History  Former smoker, no drug use, social alcohol use, , lives at home with his wife  Family History  Reviewed and noncontributory     Allergies  Bee venom protein (honey bee)    Review of Systems   HENT:  Positive for congestion.    Respiratory:  Positive for shortness of breath.    Cardiovascular:  Positive for leg swelling.   Genitourinary:  Positive for decreased urine volume and difficulty urinating.   Neurological:  Positive for weakness.   All other systems reviewed and are negative.    A 10 point review of systems was completed and is negative except what is listed in HPI  Physical Exam  Constitutional:       Appearance: Normal appearance.   HENT:      Mouth/Throat:      Mouth: Mucous membranes are dry.      Pharynx: Oropharynx is clear.   Eyes:      Pupils: Pupils are equal, round, and reactive to light.   Cardiovascular:      Rate and Rhythm: Normal rate and regular rhythm.   Pulmonary:      Effort: Pulmonary effort is normal.      Breath sounds: Normal breath sounds.   Abdominal:      General: Bowel sounds are normal.      Palpations: Abdomen is soft.   Genitourinary:     Comments: Zarco catheter  Musculoskeletal:         General: Normal range of motion.      Cervical back: Normal range of motion.      Comments:  "\"Sock line\" pitting edema B/L ankles   Skin:     General: Skin is warm and dry.      Capillary Refill: Capillary refill takes less than 2 seconds.   Neurological:      General: No focal deficit present.      Mental Status: He is alert and oriented to person, place, and time.          Last Recorded Vitals  Blood pressure 98/51, pulse 68, temperature 36.6 °C (97.9 °F), resp. rate (!) 22, weight 70.3 kg (155 lb), SpO2 93%.    Relevant Results  No results found.  Results for orders placed or performed during the hospital encounter of 04/30/25 (from the past 24 hours)   Urinalysis with Reflex Culture and Microscopic   Result Value Ref Range    Color, Urine Dark-Brown (N) Light-Yellow, Yellow, Dark-Yellow    Appearance, Urine Ex.Turbid (N) Clear    Specific Gravity, Urine 1.019 1.005 - 1.035    pH, Urine 7.0 5.0, 5.5, 6.0, 6.5, 7.0, 7.5, 8.0    Protein, Urine 300 (3+) (A) NEGATIVE, 10 (TRACE), 20 (TRACE) mg/dL    Glucose, Urine Normal Normal mg/dL    Blood, Urine OVER (3+) (A) NEGATIVE mg/dL    Ketones, Urine NEGATIVE NEGATIVE mg/dL    Bilirubin, Urine NEGATIVE NEGATIVE mg/dL    Urobilinogen, Urine Normal Normal mg/dL    Nitrite, Urine NEGATIVE NEGATIVE    Leukocyte Esterase, Urine 75 Connie/uL (A) NEGATIVE   Extra Urine Gray Tube   Result Value Ref Range    Extra Tube Hold for add-ons.    Microscopic Only, Urine   Result Value Ref Range    WBC, Urine 1-5 1-5, NONE /HPF    RBC, Urine >20 (A) NONE, 1-2, 3-5 /HPF   Coagulation Screen   Result Value Ref Range    Protime 17.2 (H) 9.8 - 12.4 seconds    INR 1.6 (H) 0.9 - 1.1    aPTT 29 26 - 36 seconds   CBC   Result Value Ref Range    WBC 6.3 4.4 - 11.3 x10*3/uL    nRBC 0.0 0.0 - 0.0 /100 WBCs    RBC 3.28 (L) 4.50 - 5.90 x10*6/uL    Hemoglobin 11.0 (L) 13.5 - 17.5 g/dL    Hematocrit 33.4 (L) 41.0 - 52.0 %     (H) 80 - 100 fL    MCH 33.5 26.0 - 34.0 pg    MCHC 32.9 32.0 - 36.0 g/dL    RDW 13.7 11.5 - 14.5 %    Platelets 138 (L) 150 - 450 x10*3/uL   Basic metabolic panel "   Result Value Ref Range    Glucose 106 (H) 74 - 99 mg/dL    Sodium 137 136 - 145 mmol/L    Potassium 5.2 3.5 - 5.3 mmol/L    Chloride 104 98 - 107 mmol/L    Bicarbonate 24 21 - 32 mmol/L    Anion Gap 14 10 - 20 mmol/L    Urea Nitrogen 43 (H) 6 - 23 mg/dL    Creatinine 1.45 (H) 0.50 - 1.30 mg/dL    eGFR 46 (L) >60 mL/min/1.73m*2    Calcium 9.6 8.6 - 10.3 mg/dL       Assessment & Plan  Urinary retention    DOYLE (acute kidney injury)    Hematuria    Anemia      Patrick is a 89-year-old male patient presented to emergency department for evaluation of urinary retention.  Patient with a history of prostate cancer states he had a Zarco catheter placed 1 week ago and was removed at approximately noon today.  Patient states that since then he has been unable to urinate and developed lower abdominal pain.  Patient with a bladder scan of 440 in ED.  Had a Zarco catheter placed and was initially going to be discharged home on prophylactic Keflex with follow-up to his urologist.  Patient developed jeremiah hematuria which clogged his Zarco catheter requiring continuous bladder irrigation.  Patient also has a medication mixup in which she has been taking Plavix, Eliquis, and baby aspirin daily.  Per medical records patient was removed from his Plavix on 4/7/2025 by his cardiologist.  There was a telephone conversation in which this was cleared up again on 4/25/2025 and the patient states he would take that Plavix out of his pillbox but he is unsure whether he did.  Consult placed to urology.  Labs obtained, patient found to have an DOYLE and anemia.  Vital signs within normal limits.  Patient admitted for further medical management.    Urinary retention/DOYLE/hematuria/anemia  Inpatient telemetry admission to Dr. Hilda Zarco/continuous bladder irrigation  Consult urology and appreciate input  Hold home anticoagulation including Eliquis, baby aspirin  Patient requiring medication education  Trend hemoglobin  No IV fluids for DOYLE 2/2  history of CHF  Hold home furosemide/Aldactone 2/2 DOYLE  Sock pitting edema noted to bilateral lower extremities  Cardiac/low-sodium diet  Tylenol as needed    Prostate cancer/HTN/CHF/COPD/diverticulosis/CAD/DJD  #Chronic conditions  Continue home atorvastatin 40 mg daily  Continue home inhalers  Hold home Eliquis, baby aspirin, Lasix, Aldactone  Attending to restart medications as appropriate  Echocardiogram on file 3/6/2025: EF 50 to 55%, mild AVR    DVT Ppx  SCDs  No chemical prophylaxis 2/2 hematuria  Up to chair with assistance      Chief Complaint   Patient presents with    Urinary Retention     Had paz removed this am, has not urinated since.       This is a 89 years old male patient presented to the emergency department with a chief complaint of urinary retention.  Stated that today around noon he removed his Paz catheter and he could not pass urine since then.  Denies any flank pain, abdominal pain, nausea, vomiting, fever, chills, body aches, chest pain.    Review of system: As stated above in the HPI section.    had concerns including Urinary Retention (Had paz removed this am, has not urinated since.).       Problem List Items Addressed This Visit       * (Principal) Urinary retention - Primary    Relevant Medications    cephalexin (Keflex) 500 mg capsule    Other Relevant Orders    Referral to Urology    Hematuria       HPI       Urinary Retention     Additional comments: Had paz removed this am, has not urinated since.          Last edited by Javid Moya RN on 4/30/2025  8:26 PM.          Problem List as of 5/1/2025 Reviewed: 12/30/2024 11:23 PM by BONNIE Cristobal-CNP      Acquired spondylolisthesis of lumbosacral region    COPD (chronic obstructive pulmonary disease) (Multi)    Arthritis of left hip    Coronary artery disease    Diverticulosis    DJD (degenerative joint disease)    Dyslipidemia    HTN (hypertension)    Mitral valve insufficiency    Prostate CA (Multi)    S/P mitral valve  clip implantation    Dyspnea on exertion    Cataract, nuclear sclerotic, both eyes    Contusion of hip    Eye strain, bilateral    History of malignant neoplasm of skin    History of repair of hip joint    Hyperopia of both eyes with regular astigmatism    Regular astigmatism of both eyes with presbyopia    Left hip pain    Low back pain    CHF (congestive heart failure), NYHA class II, acute on chronic, diastolic    * (Principal) Urinary retention    DOYLE (acute kidney injury)    Hematuria    Anemia       Active Ambulatory Problems     Diagnosis Date Noted    Acquired spondylolisthesis of lumbosacral region 02/20/2024    COPD (chronic obstructive pulmonary disease) (Multi) 02/20/2024    Arthritis of left hip 02/20/2024    Coronary artery disease 02/20/2024    Diverticulosis 02/20/2024    DJD (degenerative joint disease) 02/20/2024    Dyslipidemia 02/20/2024    HTN (hypertension) 02/20/2024    Mitral valve insufficiency 02/20/2024    Prostate CA (Multi) 02/20/2024    S/P mitral valve clip implantation 02/20/2024    Dyspnea on exertion 02/20/2024    Cataract, nuclear sclerotic, both eyes 05/02/2023    Contusion of hip 03/01/2024    Eye strain, bilateral 03/01/2024    History of malignant neoplasm of skin 03/01/2024    History of repair of hip joint 03/01/2024    Hyperopia of both eyes with regular astigmatism 03/01/2024    Regular astigmatism of both eyes with presbyopia 03/01/2024    Left hip pain 03/01/2024    Low back pain 03/01/2024    CHF (congestive heart failure), NYHA class II, acute on chronic, diastolic 11/01/2024     Resolved Ambulatory Problems     Diagnosis Date Noted    Congestive heart failure 03/01/2024    Hyperlipoproteinemia 02/20/2024     Past Medical History:   Diagnosis Date    Cataract     CHF (congestive heart failure)     Hypertension     Personal history of malignant neoplasm of prostate     Personal history of other malignant neoplasm of skin            Active Orders   Procedures     Irrigation of Bladder     Frequency: Once     Number of Occurrences: 1 Occurrences     Order Comments: Please proceed with continuous bladder irrigation.  Thank you     Diet    Adult diet Cardiac; 70 gm fat; 2 - 3 grams Sodium     Frequency: Effective now     Number of Occurrences: Until Specified   Nursing    Activity (specify) Activity With Exceptions; Up in chair     Frequency: Until discontinued     Number of Occurrences: Until Specified    Apply sequential compression device     Frequency: Until discontinued     Number of Occurrences: Until Specified    Height on admission     Frequency: Once     Number of Occurrences: 1 Occurrences    Maintain Urethral Catheter with Nurse Driven Indwelling Urethral Catheter Removal Protocol     Frequency: Until discontinued     Number of Occurrences: Until Specified    Maintain Urethral Catheter with Nurse Driven Indwelling Urethral Catheter Removal Protocol     Frequency: Until discontinued     Number of Occurrences: Until Specified    No Isolation Required     Frequency: Once     Number of Occurrences: 1 Occurrences    Notify provider     Frequency: Until discontinued     Number of Occurrences: Until Specified    Nursing Guidelines: Paz Cath     Frequency: Until discontinued     Number of Occurrences: Until Specified     Order Comments: 1. Perform a Post Voiding Residual (PVR) using the Bladder Volume Control Indicator (BVI) the first two voidings after a paz catheter is removed.  If the PVR is greater than 450cc perform straight cath.  If the patient requires a second straight cath procedure notify physician on the next two rounds.  2. If the patient has not voided after 8 hours check volume with the BVI and straight cath if over 450cc.  3. If no voiding at discharge reinsert indwelling urinary catheter and recommend referral to Urology Clinic at discharge.   4. Notify physician of indwelling urinary catheter replacement      Pain Assessment     Frequency: Per unit  standards     Number of Occurrences: Until Specified    Physician Communication     Frequency: Until discontinued     Number of Occurrences: Until Specified     Order Comments: IF patient requires a second straight cath procedure after paz removed, notify physician on the next rounds      Vital Signs     Frequency: q4h     Number of Occurrences: Until Specified    Weight on admission     Frequency: Once     Number of Occurrences: 1 Occurrences   Consult    Inpatient consult to Social Work and TCC     Frequency: Once     Number of Occurrences: 1 Occurrences    Inpatient consult to Urology     Frequency: Once     Number of Occurrences: 1 Occurrences   Nourishments    May Participate in Room Service     Frequency: Once     Number of Occurrences: 1 Occurrences   Respiratory Care    Respiratory care eval and treat     Frequency: Once     Number of Occurrences: 1 Occurrences   ECG    ECG 12 lead     Frequency: Once     Number of Occurrences: 1 Occurrences    Electrocardiogram, 12-lead PRN ACS symptoms     Frequency: PRN     Number of Occurrences: Until Specified     Order Comments: Notify provider if performed.     Telemetry    Telemetry monitoring - Floor only     Frequency: Until discontinued     Number of Occurrences: 3 Days   Medications    acetaminophen (Tylenol) tablet 650 mg     Frequency: q4h PRN     Dose: 650 mg     Route: oral    albuterol 90 mcg/actuation inhaler 2 puff     Frequency: q6h PRN     Dose: 2 puff     Route: inhalation    atorvastatin (Lipitor) tablet 40 mg     Frequency: Daily     Dose: 40 mg     Route: oral    fluticasone furoate-vilanteroL (Breo Ellipta) 200-25 mcg/dose inhaler 1 puff     Linked Order: And     Frequency: Daily     Dose: 1 puff     Route: inhalation    furosemide (Lasix) tablet 20 mg     Frequency: Every other day     Dose: 20 mg     Route: oral    spironolactone (Aldactone) tablet 12.5 mg     Frequency: Daily     Dose: 12.5 mg     Route: oral    tiotropium (Spiriva Respimat)  2.5 mcg/actuation inhaler 2 puff     Linked Order: And     Frequency: Daily     Dose: 2 puff     Route: inhalation     Dietary Orders (From admission, onward)       Start     Ordered    05/01/25 0213  May Participate in Room Service  ( ROOM SERVICE MAY PARTICIPATE)  Once        Question:  .  Answer:  Yes    05/01/25 0212 05/01/25 0012  Adult diet Cardiac; 70 gm fat; 2 - 3 grams Sodium  Diet effective now        Question Answer Comment   Diet type Cardiac    Fat restriction: 70 gm fat    Sodium restriction: 2 - 3 grams Sodium        05/01/25 0011                  89 yrs@  ADMITDTTM@  Urinary retention [R33.9]  Hematuria, unspecified type [R31.9]  [unfilled]  Weight: 70.3 kg (155 lb)     Vitals:    04/30/25 2024 04/30/25 2100 04/30/25 2200 05/01/25 0209   BP: 119/56 116/64  107/67   Pulse: 83 56 66 58   Resp: (!) 22      Temp: 36.6 °C (97.9 °F)   36.5 °C (97.7 °F)   SpO2: 99% 100% 98% 98%   Weight: 70.3 kg (155 lb)       05/01/25 0351 05/01/25 0800 05/01/25 1129   BP: 109/67 112/61 98/51   Pulse: 57 60 68   Resp:      Temp: 36.3 °C (97.3 °F) 36.4 °C (97.5 °F) 36.6 °C (97.9 °F)   SpO2: 97% (!) 89% 93%   Weight:               Chief Complaint    Urinary Retention         Patient seen resting in bed with head of bed elevated, no s/s or c/o acute difficulties at this time.  Vital signs for last 24 hours Temp:  [36.3 °C (97.3 °F)-36.6 °C (97.9 °F)] 36.6 °C (97.9 °F)  Heart Rate:  [56-83] 68  Resp:  [22] 22  BP: ()/(51-67) 98/51    I/O this shift:  In: -   Out: 600 [Urine:600]  Patient still requiring frequent cardiac and SPO2 monitoring. Continue aggressive pulmonary hygiene and oral hygiene. Off loading as tolerated for skin integrity. Medications and labs reviewed-    Patient recently received an antibiotic (last 12 hours)       None            Results for orders placed or performed during the hospital encounter of 04/30/25 (from the past 96 hours)   Urinalysis with Reflex Culture and Microscopic   Result Value  Ref Range    Color, Urine Dark-Brown (N) Light-Yellow, Yellow, Dark-Yellow    Appearance, Urine Ex.Turbid (N) Clear    Specific Gravity, Urine 1.019 1.005 - 1.035    pH, Urine 7.0 5.0, 5.5, 6.0, 6.5, 7.0, 7.5, 8.0    Protein, Urine 300 (3+) (A) NEGATIVE, 10 (TRACE), 20 (TRACE) mg/dL    Glucose, Urine Normal Normal mg/dL    Blood, Urine OVER (3+) (A) NEGATIVE mg/dL    Ketones, Urine NEGATIVE NEGATIVE mg/dL    Bilirubin, Urine NEGATIVE NEGATIVE mg/dL    Urobilinogen, Urine Normal Normal mg/dL    Nitrite, Urine NEGATIVE NEGATIVE    Leukocyte Esterase, Urine 75 Connie/uL (A) NEGATIVE   Extra Urine Gray Tube   Result Value Ref Range    Extra Tube Hold for add-ons.    Microscopic Only, Urine   Result Value Ref Range    WBC, Urine 1-5 1-5, NONE /HPF    RBC, Urine >20 (A) NONE, 1-2, 3-5 /HPF   Coagulation Screen   Result Value Ref Range    Protime 17.2 (H) 9.8 - 12.4 seconds    INR 1.6 (H) 0.9 - 1.1    aPTT 29 26 - 36 seconds   CBC   Result Value Ref Range    WBC 6.3 4.4 - 11.3 x10*3/uL    nRBC 0.0 0.0 - 0.0 /100 WBCs    RBC 3.28 (L) 4.50 - 5.90 x10*6/uL    Hemoglobin 11.0 (L) 13.5 - 17.5 g/dL    Hematocrit 33.4 (L) 41.0 - 52.0 %     (H) 80 - 100 fL    MCH 33.5 26.0 - 34.0 pg    MCHC 32.9 32.0 - 36.0 g/dL    RDW 13.7 11.5 - 14.5 %    Platelets 138 (L) 150 - 450 x10*3/uL   Basic metabolic panel   Result Value Ref Range    Glucose 106 (H) 74 - 99 mg/dL    Sodium 137 136 - 145 mmol/L    Potassium 5.2 3.5 - 5.3 mmol/L    Chloride 104 98 - 107 mmol/L    Bicarbonate 24 21 - 32 mmol/L    Anion Gap 14 10 - 20 mmol/L    Urea Nitrogen 43 (H) 6 - 23 mg/dL    Creatinine 1.45 (H) 0.50 - 1.30 mg/dL    eGFR 46 (L) >60 mL/min/1.73m*2    Calcium 9.6 8.6 - 10.3 mg/dL     Patient fully evaluated 05/01, thorough record review performed of previous labs and notes from prior encounters. Plan discussed with interdisciplinary team, consults placed, appreciate input. Will continue current and repeat labs in the AM.      Discharge planning  discussed with patient and care team. Therapy evaluations ordered. Patient aware and agreeable to current plan, continue plan as above.     I spent a total of 75 minutes on the date of the service which included preparing to see the patient, face-to-face patient care, completing clinical documentation, obtaining and/or reviewing separately obtained history, performing a medically appropriate examination, counseling and educating the patient/family/caregiver, ordering medications, tests, or procedures, communicating with other HCPs (not separately reported), independently interpreting results (not separately reported), communicating results to the patient/family/caregiver, and care coordination (not separately reported).         Sangeetha Dumont

## 2025-05-01 NOTE — ED NOTES
Flow issues reported with paz.  Flushed and return flow noted.      Javid Moya RN  04/30/25 [id number]

## 2025-05-02 LAB
ALBUMIN SERPL BCP-MCNC: 3.6 G/DL (ref 3.4–5)
ALP SERPL-CCNC: 88 U/L (ref 33–136)
ALT SERPL W P-5'-P-CCNC: 16 U/L (ref 10–52)
ANION GAP SERPL CALC-SCNC: 13 MMOL/L (ref 10–20)
AST SERPL W P-5'-P-CCNC: 24 U/L (ref 9–39)
BACTERIA UR CULT: NORMAL
BASOPHILS # BLD AUTO: 0.03 X10*3/UL (ref 0–0.1)
BASOPHILS NFR BLD AUTO: 0.6 %
BILIRUB SERPL-MCNC: 1.2 MG/DL (ref 0–1.2)
BUN SERPL-MCNC: 30 MG/DL (ref 6–23)
CALCIUM SERPL-MCNC: 9 MG/DL (ref 8.6–10.3)
CHLORIDE SERPL-SCNC: 106 MMOL/L (ref 98–107)
CO2 SERPL-SCNC: 24 MMOL/L (ref 21–32)
CREAT SERPL-MCNC: 1.16 MG/DL (ref 0.5–1.3)
EGFRCR SERPLBLD CKD-EPI 2021: 60 ML/MIN/1.73M*2
EOSINOPHIL # BLD AUTO: 0.1 X10*3/UL (ref 0–0.4)
EOSINOPHIL NFR BLD AUTO: 2 %
ERYTHROCYTE [DISTWIDTH] IN BLOOD BY AUTOMATED COUNT: 13.7 % (ref 11.5–14.5)
GLUCOSE SERPL-MCNC: 98 MG/DL (ref 74–99)
HCT VFR BLD AUTO: 31.2 % (ref 41–52)
HGB BLD-MCNC: 10.2 G/DL (ref 13.5–17.5)
IMM GRANULOCYTES # BLD AUTO: 0.01 X10*3/UL (ref 0–0.5)
IMM GRANULOCYTES NFR BLD AUTO: 0.2 % (ref 0–0.9)
LYMPHOCYTES # BLD AUTO: 1.02 X10*3/UL (ref 0.8–3)
LYMPHOCYTES NFR BLD AUTO: 20.5 %
MCH RBC QN AUTO: 33.2 PG (ref 26–34)
MCHC RBC AUTO-ENTMCNC: 32.7 G/DL (ref 32–36)
MCV RBC AUTO: 102 FL (ref 80–100)
MONOCYTES # BLD AUTO: 0.53 X10*3/UL (ref 0.05–0.8)
MONOCYTES NFR BLD AUTO: 10.7 %
NEUTROPHILS # BLD AUTO: 3.28 X10*3/UL (ref 1.6–5.5)
NEUTROPHILS NFR BLD AUTO: 66 %
NRBC BLD-RTO: 0 /100 WBCS (ref 0–0)
PLATELET # BLD AUTO: 136 X10*3/UL (ref 150–450)
POTASSIUM SERPL-SCNC: 4.5 MMOL/L (ref 3.5–5.3)
PROT SERPL-MCNC: 5.6 G/DL (ref 6.4–8.2)
RBC # BLD AUTO: 3.07 X10*6/UL (ref 4.5–5.9)
SODIUM SERPL-SCNC: 138 MMOL/L (ref 136–145)
WBC # BLD AUTO: 5 X10*3/UL (ref 4.4–11.3)

## 2025-05-02 PROCEDURE — 94640 AIRWAY INHALATION TREATMENT: CPT

## 2025-05-02 PROCEDURE — 1200000002 HC GENERAL ROOM WITH TELEMETRY DAILY

## 2025-05-02 PROCEDURE — 93010 ELECTROCARDIOGRAM REPORT: CPT | Performed by: STUDENT IN AN ORGANIZED HEALTH CARE EDUCATION/TRAINING PROGRAM

## 2025-05-02 PROCEDURE — 2500000001 HC RX 250 WO HCPCS SELF ADMINISTERED DRUGS (ALT 637 FOR MEDICARE OP): Performed by: NURSE PRACTITIONER

## 2025-05-02 PROCEDURE — 36415 COLL VENOUS BLD VENIPUNCTURE: CPT | Performed by: INTERNAL MEDICINE

## 2025-05-02 PROCEDURE — 84075 ASSAY ALKALINE PHOSPHATASE: CPT | Performed by: INTERNAL MEDICINE

## 2025-05-02 PROCEDURE — 85025 COMPLETE CBC W/AUTO DIFF WBC: CPT | Performed by: INTERNAL MEDICINE

## 2025-05-02 PROCEDURE — 2500000004 HC RX 250 GENERAL PHARMACY W/ HCPCS (ALT 636 FOR OP/ED): Mod: JZ | Performed by: INTERNAL MEDICINE

## 2025-05-02 PROCEDURE — 74176 CT ABD & PELVIS W/O CONTRAST: CPT | Performed by: RADIOLOGY

## 2025-05-02 RX ORDER — CEFTRIAXONE 1 G/50ML
1 INJECTION, SOLUTION INTRAVENOUS EVERY 24 HOURS
Status: DISPENSED | OUTPATIENT
Start: 2025-05-02

## 2025-05-02 RX ADMIN — CEFTRIAXONE SODIUM 1 G: 1 INJECTION, SOLUTION INTRAVENOUS at 17:19

## 2025-05-02 RX ADMIN — FLUTICASONE FUROATE AND VILANTEROL TRIFENATATE 1 PUFF: 200; 25 POWDER RESPIRATORY (INHALATION) at 07:08

## 2025-05-02 RX ADMIN — TIOTROPIUM BROMIDE INHALATION SPRAY 2 PUFF: 3.12 SPRAY, METERED RESPIRATORY (INHALATION) at 07:06

## 2025-05-02 RX ADMIN — ATORVASTATIN CALCIUM 40 MG: 40 TABLET, FILM COATED ORAL at 08:46

## 2025-05-02 ASSESSMENT — COGNITIVE AND FUNCTIONAL STATUS - GENERAL
HELP NEEDED FOR BATHING: A LITTLE
TOILETING: A LITTLE
EATING MEALS: A LITTLE
MOVING FROM LYING ON BACK TO SITTING ON SIDE OF FLAT BED WITH BEDRAILS: A LITTLE
DAILY ACTIVITIY SCORE: 18
MOBILITY SCORE: 24
MOVING TO AND FROM BED TO CHAIR: A LITTLE
TOILETING: A LITTLE
PERSONAL GROOMING: A LITTLE
DRESSING REGULAR UPPER BODY CLOTHING: A LITTLE
MOBILITY SCORE: 18
STANDING UP FROM CHAIR USING ARMS: A LITTLE
DAILY ACTIVITIY SCORE: 23
CLIMB 3 TO 5 STEPS WITH RAILING: A LITTLE
TURNING FROM BACK TO SIDE WHILE IN FLAT BAD: A LITTLE
WALKING IN HOSPITAL ROOM: A LITTLE
DRESSING REGULAR LOWER BODY CLOTHING: A LITTLE

## 2025-05-02 ASSESSMENT — PAIN SCALES - GENERAL
PAINLEVEL_OUTOF10: 0 - NO PAIN
PAINLEVEL_OUTOF10: 0 - NO PAIN

## 2025-05-02 ASSESSMENT — PAIN - FUNCTIONAL ASSESSMENT: PAIN_FUNCTIONAL_ASSESSMENT: 0-10

## 2025-05-02 NOTE — PROGRESS NOTES
Patrick Vázquez is a 89 y.o. male on day 1 of admission presenting with Urinary retention.    Subjective   Zarco was hand irrigated yesterday  Cbi running moderately with light rust to red in tube and bag, no clots  H/h 10/31  Cr improved1.16       Objective     Physical Exam    Last Recorded Vitals  Blood pressure 111/59, pulse 55, temperature 36.5 °C (97.7 °F), resp. rate 18, weight 70.3 kg (155 lb), SpO2 95%.  Intake/Output last 3 Shifts:  I/O last 3 completed shifts:  In: - (0 mL/kg)   Out: -3400 (-48.4 mL/kg)   Dosing Weight: 70.3 kg     Relevant Results  Results for orders placed or performed during the hospital encounter of 04/30/25 (from the past 24 hours)   Comprehensive Metabolic Panel   Result Value Ref Range    Glucose 98 74 - 99 mg/dL    Sodium 138 136 - 145 mmol/L    Potassium 4.5 3.5 - 5.3 mmol/L    Chloride 106 98 - 107 mmol/L    Bicarbonate 24 21 - 32 mmol/L    Anion Gap 13 10 - 20 mmol/L    Urea Nitrogen 30 (H) 6 - 23 mg/dL    Creatinine 1.16 0.50 - 1.30 mg/dL    eGFR 60 (L) >60 mL/min/1.73m*2    Calcium 9.0 8.6 - 10.3 mg/dL    Albumin 3.6 3.4 - 5.0 g/dL    Alkaline Phosphatase 88 33 - 136 U/L    Total Protein 5.6 (L) 6.4 - 8.2 g/dL    AST 24 9 - 39 U/L    Bilirubin, Total 1.2 0.0 - 1.2 mg/dL    ALT 16 10 - 52 U/L   CBC and Auto Differential   Result Value Ref Range    WBC 5.0 4.4 - 11.3 x10*3/uL    nRBC 0.0 0.0 - 0.0 /100 WBCs    RBC 3.07 (L) 4.50 - 5.90 x10*6/uL    Hemoglobin 10.2 (L) 13.5 - 17.5 g/dL    Hematocrit 31.2 (L) 41.0 - 52.0 %     (H) 80 - 100 fL    MCH 33.2 26.0 - 34.0 pg    MCHC 32.7 32.0 - 36.0 g/dL    RDW 13.7 11.5 - 14.5 %    Platelets 136 (L) 150 - 450 x10*3/uL    Neutrophils % 66.0 40.0 - 80.0 %    Immature Granulocytes %, Automated 0.2 0.0 - 0.9 %    Lymphocytes % 20.5 13.0 - 44.0 %    Monocytes % 10.7 2.0 - 10.0 %    Eosinophils % 2.0 0.0 - 6.0 %    Basophils % 0.6 0.0 - 2.0 %    Neutrophils Absolute 3.28 1.60 - 5.50 x10*3/uL    Immature Granulocytes Absolute, Automated  0.01 0.00 - 0.50 x10*3/uL    Lymphocytes Absolute 1.02 0.80 - 3.00 x10*3/uL    Monocytes Absolute 0.53 0.05 - 0.80 x10*3/uL    Eosinophils Absolute 0.10 0.00 - 0.40 x10*3/uL    Basophils Absolute 0.03 0.00 - 0.10 x10*3/uL      Current Medications[1]                  This patient has a urinary catheter   Reason for the urinary catheter remaining today?   Retention with hematuria           Assessment & Plan  Urinary retention    DOYLE (acute kidney injury)    Hematuria    Anemia    Maintan paz and cbi  Titrate to light red        I spent 20   minutes in the professional and overall care of this patient.      Perry Ramirez PA-C         [1]   Current Facility-Administered Medications:     acetaminophen (Tylenol) tablet 650 mg, 650 mg, oral, q4h PRN, PADMINI Bourne, 650 mg at 05/01/25 0900    albuterol 90 mcg/actuation inhaler 2 puff, 2 puff, inhalation, q6h PRN, PADMINI Bourne    atorvastatin (Lipitor) tablet 40 mg, 40 mg, oral, Daily, BONNIE Bourne-CNP, 40 mg at 05/02/25 0846    tiotropium (Spiriva Respimat) 2.5 mcg/actuation inhaler 2 puff, 2 puff, inhalation, Daily, 2 puff at 05/02/25 0706 **AND** fluticasone furoate-vilanteroL (Breo Ellipta) 200-25 mcg/dose inhaler 1 puff, 1 puff, inhalation, Daily, PADMINI Bourne, 1 puff at 05/02/25 0708    [Held by provider] furosemide (Lasix) tablet 20 mg, 20 mg, oral, Every other day, PADMINI Bourne    [Held by provider] spironolactone (Aldactone) tablet 12.5 mg, 12.5 mg, oral, Daily, PADMINI Bourne

## 2025-05-02 NOTE — PROGRESS NOTES
05/02/25 1156   Discharge Planning   Living Arrangements Spouse/significant other   Support Systems Spouse/significant other   Type of Residence Private residence   Number of Stairs to Enter Residence 1   Number of Stairs Within Residence 13   Expected Discharge Disposition Home   Does the patient need discharge transport arranged? No     I met with patient at the bedside, verified insurance and demographics.  Patient lives with his significant other of 15yrs.  He ambulates with a cane when is walking long distances.  He has not fallen, drives and is independent with all ADLs.  Patient is unsure if he will be discharged with Zarco catheter so he isn't sure if he wants home health care.  He disclosed that his S.O. has been falling more frequently, recent fell and broke her nose so he requested information about programs and services she may benefit from.  I provided him with a senior living guide, a private duty home care list and printed information about [facility].  Care Coordination team following for assistance with discharge planning.  Melba PROCTOR [facility]

## 2025-05-02 NOTE — PROGRESS NOTES
Patrick Vázquez is a 89 y.o. male on day 1 of admission presenting with Urinary retention.      Subjective   Patient fully evaluated  05/02  for    Problem List Items Addressed This Visit       * (Principal) Urinary retention - Primary    Relevant Medications    cephalexin (Keflex) 500 mg capsule    Other Relevant Orders    Referral to Urology    Hematuria     Patient seen resting in bed with head of bed elevated, no s/s or c/o acute difficulties at this time.  Vital signs for last 24 hours Temp:  [36.4 °C (97.5 °F)-36.8 °C (98.2 °F)] 36.5 °C (97.7 °F)  Heart Rate:  [53-56] 56  Resp:  [18-20] 18  BP: (103-114)/(54-62) 114/54    I/O this shift:  In: 240 [P.O.:240]  Out: 3900 [Urine:3900]  Patient still requiring frequent cardiac and SPO2 monitoring. Continue aggressive pulmonary hygiene and oral hygiene. Off loading as tolerated for skin integrity. Medications and labs reviewed-   Results for orders placed or performed during the hospital encounter of 04/30/25 (from the past 24 hours)   Comprehensive Metabolic Panel   Result Value Ref Range    Glucose 98 74 - 99 mg/dL    Sodium 138 136 - 145 mmol/L    Potassium 4.5 3.5 - 5.3 mmol/L    Chloride 106 98 - 107 mmol/L    Bicarbonate 24 21 - 32 mmol/L    Anion Gap 13 10 - 20 mmol/L    Urea Nitrogen 30 (H) 6 - 23 mg/dL    Creatinine 1.16 0.50 - 1.30 mg/dL    eGFR 60 (L) >60 mL/min/1.73m*2    Calcium 9.0 8.6 - 10.3 mg/dL    Albumin 3.6 3.4 - 5.0 g/dL    Alkaline Phosphatase 88 33 - 136 U/L    Total Protein 5.6 (L) 6.4 - 8.2 g/dL    AST 24 9 - 39 U/L    Bilirubin, Total 1.2 0.0 - 1.2 mg/dL    ALT 16 10 - 52 U/L   CBC and Auto Differential   Result Value Ref Range    WBC 5.0 4.4 - 11.3 x10*3/uL    nRBC 0.0 0.0 - 0.0 /100 WBCs    RBC 3.07 (L) 4.50 - 5.90 x10*6/uL    Hemoglobin 10.2 (L) 13.5 - 17.5 g/dL    Hematocrit 31.2 (L) 41.0 - 52.0 %     (H) 80 - 100 fL    MCH 33.2 26.0 - 34.0 pg    MCHC 32.7 32.0 - 36.0 g/dL    RDW 13.7 11.5 - 14.5 %    Platelets 136 (L) 150 - 450  x10*3/uL    Neutrophils % 66.0 40.0 - 80.0 %    Immature Granulocytes %, Automated 0.2 0.0 - 0.9 %    Lymphocytes % 20.5 13.0 - 44.0 %    Monocytes % 10.7 2.0 - 10.0 %    Eosinophils % 2.0 0.0 - 6.0 %    Basophils % 0.6 0.0 - 2.0 %    Neutrophils Absolute 3.28 1.60 - 5.50 x10*3/uL    Immature Granulocytes Absolute, Automated 0.01 0.00 - 0.50 x10*3/uL    Lymphocytes Absolute 1.02 0.80 - 3.00 x10*3/uL    Monocytes Absolute 0.53 0.05 - 0.80 x10*3/uL    Eosinophils Absolute 0.10 0.00 - 0.40 x10*3/uL    Basophils Absolute 0.03 0.00 - 0.10 x10*3/uL      Patient recently received an antibiotic (last 12 hours)       None           Plan discussed with interdisciplinary team, paz with CBI to continue, hematuria still present, no clots, titrate to clear pink per urology, appreciate input. Patient hemoglobin slight drop overnight @ 10.2 from 11.0, creatinine improved @ 1.6, will continue to trend, continue current and repeat labs in the AM.     Discharge planning discussed with patient and care team. Therapy evaluations ordered. Anticipate HHC/SNF at discharge. Patient aware and agreeable to current plan, continue plan as above.     I spent a total of 60 minutes on the date of the service which included preparing to see the patient, face-to-face patient care, completing clinical documentation, obtaining and/or reviewing separately obtained history, performing a medically appropriate examination, counseling and educating the patient/family/caregiver, ordering medications, tests, or procedures, communicating with other HCPs (not separately reported), independently interpreting results (not separately reported), communicating results to the patient/family/caregiver, and care coordination (not separately reported).        Objective     Last Recorded Vitals  /54   Pulse 56   Temp 36.5 °C (97.7 °F)   Resp 18   Wt 70.3 kg (155 lb)   SpO2 97%   Intake/Output last 3 Shifts:    Intake/Output Summary (Last 24 hours) at  5/2/2025 1217  Last data filed at 5/2/2025 1200  Gross per 24 hour   Intake 240 ml   Output 7800 ml   Net -7560 ml       Admission Weight  Weight: 70.3 kg (155 lb) (04/30/25 2024)    Daily Weight  04/30/25 : 70.3 kg (155 lb)    Image Results  Transthoracic echo (TTE) complete     [facility], [address] [place] [address]            Tel [phone] and Fax [phone]    TRANSTHORACIC ECHOCARDIOGRAM REPORT       Patient Name:       RAMSES BENSON AMBROSIO       Reading Physician:    [id number] Shin MD  Study Date:         3/6/2025            Ordering Provider:    [id number] SHIN  MRN/PID:            [id number]            Fellow:  Accession#:         [id number]        Nurse:  Date of Birth/Age:  1935 / 89      Sonographer:          Spencer STEVENSON, RVT, RDCS  Gender assigned at  M                   Additional Staff:  Birth:  Height:             172.72 cm           Admit Date:           3/6/2025  Weight:             64.86 kg            Admission Status:     Outpatient  BSA / BMI:          1.77 m2 / 21.74     Encounter#:           [id number]                      kg/m2  Blood Pressure:     118/60 mmHg         Department Location:  [facility]    Study Type:    TRANSTHORACIC ECHO (TTE) COMPLETE  Diagnosis/ICD: Presence of other cardiac implants and grafts-Z95.818; Chronic                 combined systolic (congestive) and diastolic (congestive) heart                 failure (CHF)-I50.42  Indication:    Hypertension, Dyspnea, Congestive Heart Failure, s/p Mitral Clip  CPT Code:      Echo Complete w Full Doppler-[id number]    Patient History:  Valve Disorders:   Mitral Regurgitation, Tricuspid Regurgitation and Pulmonic                     Insufficiency.  Pertinent History: HTN, Hyperlipidemia, COPD,  CHF, Cancer, CAD and Dyspnea. Hx:                     Prostate Cancer, Mitral Valve Clip (7/14/22).    Study Detail: The following Echo studies were performed: 2D, M-Mode, Doppler and                color flow. Technically challenging study due to body habitus and                prominent lung artifact. The patient was awake.       PHYSICIAN INTERPRETATION:  Left Ventricle: Left ventricular ejection fraction is low normal, by visual estimate at 50-55%. There is severely increased concentric left ventricular hypertrophy. There are no regional left ventricular wall motion abnormalities. The left ventricular cavity size is normal. There is severely increased septal and severely increased posterior left ventricular wall thickness. Spectral Doppler shows a Grade III (restrictive pattern) of left ventricular diastolic filling with an elevated left atrial pressure.  Left Atrium: The left atrial size is severely dilated.  Right Ventricle: The right ventricle is normal in size. There is normal right ventricular global systolic function.  Right Atrium: The right atrium is normal in size.  Aortic Valve: The aortic valve is trileaflet. There is mild aortic valve thickening. There is evidence of mild aortic valve stenosis. The aortic valve dimensionless index is 0.39. There is trace to mild aortic valve regurgitation. The peak instantaneous gradient of the aortic valve is 8 mmHg. The mean gradient of the aortic valve is 4 mmHg.  Mitral Valve: The mitral valve is mildly thickened. The peak instantaneous gradient of the mitral valve is 12 mmHg. There is mild mitral valve regurgitation. MitraClip in place.  Tricuspid Valve: The tricuspid valve is structurally normal. There is mild tricuspid regurgitation. The Doppler estimated RVSP is moderately elevated at 38.5 mmHg.  Pulmonic Valve: The pulmonic valve is structurally normal. There is mild pulmonic valve regurgitation.  Pericardium: There is no pericardial effusion noted.  Aorta:  The aortic root is normal.  Systemic Veins: The inferior vena cava appears dilated.       CONCLUSIONS:   1. Left ventricular ejection fraction is low normal, by visual estimate at 50-55%.   2. Spectral Doppler shows a Grade III (restrictive pattern) of left ventricular diastolic filling with an elevated left atrial pressure.   3. There is severely increased septal and severely increased posterior left ventricular wall thickness.   4. There is severely increased concentric left ventricular hypertrophy.   5. There is normal right ventricular global systolic function.   6. The left atrial size is severely dilated.   7. MitraClip in place.   8. Moderately elevated right ventricular systolic pressure.   9. Mild aortic valve stenosis.    QUANTITATIVE DATA SUMMARY:     2D MEASUREMENTS:          Normal Ranges:  LAs:             4.80 cm  (2.7-4.0cm)  RVIDd:           3.75 cm  (0.9-3.6cm)  IVSd:            2.01 cm  (0.6-1.1cm)  LVPWd:           1.90 cm  (0.6-1.1cm)  LVIDd:           3.72 cm  (3.9-5.9cm)  LVIDs:           3.28 cm  LV Mass Index:   184 g/m2  LVEDV Index:     81 ml/m2  LV % FS          11.9 %       LEFT ATRIUM:                  Normal Ranges:  LA Vol A4C:        101.4 ml   (22+/-6mL/m2)  LA Vol A2C:        98.0 ml  LA Vol BP:         101.1 ml  LA Vol Index A4C:  57.2 ml/m2  LA Vol Index A2C:  55.3 ml/m2  LA Vol Index BP:   57.1 ml/m2  LA Area A4C:       28.9 cm2  LA Area A2C:       28.0 cm2  LA Major Axis A4C: 7.0 cm  LA Major Axis A2C: 6.8 cm  LA Vol A4C:        92.4 ml  LA Vol A2C:        134.6 ml  LA Vol Index BSA:  64.1 ml/m2       RIGHT ATRIUM:                 Normal Ranges:  RA Vol A4C:        79.0 ml    (8.3-19.5ml)  RA Vol Index A4C:  44.6 ml/m2  RA Area A4C:       24.2 cm2  RA Major Axis A4C: 6.3 cm       M-MODE MEASUREMENTS:            Normal Ranges:  Ao Root:             3.90 cm    (2.0-3.7cm)  AoV Exc:             1.67 cm    (1.5-2.5cm)  LAs:                 4.68 cm    (2.7-4.0cm)  IVSd:                 1.53 cm    (0.6-1.1cm)  LVPWd:               1.60 cm    (0.6-1.1cm)  LVIDd:               5.39 cm    (3.9-5.9cm)  LVIDs:               3.48 cm  LV Mass Index:       217.4 g/m2  LV % FS              35.5 %       AORTA MEASUREMENTS:         Normal Ranges:  AoV Exc:            1.67 cm (1.5-2.5cm)  AoV Marcela,s:          2.00 cm (1.4-2.6cm)  Ao Sinus, d:        3.40 cm (2.1-3.5cm)  Ao STJ, d:          2.90 cm (1.7-3.4cm)  Asc Ao, d:          3.50 cm (2.1-3.4cm)  Ao Arch:            2.90 cm (2.0-3.6cm)       LV SYSTOLIC FUNCTION:                       Normal Ranges:  EF-A4C View:    56 % (>=55%)  EF-A2C View:    48 %  EF-Biplane:     52 %  EF-Visual:      53 %  LV EF Reported: 53 %       LV DIASTOLIC FUNCTION:            Normal Ranges:  MV Peak E:             1.25 m/s   (0.7-1.2 m/s)  MV Peak A:             0.64 m/s   (0.42-0.7 m/s)  E/A Ratio:             1.96       (1.0-2.2)  MV e'                  0.020 m/s  (>8.0)  MV lateral e'          0.02 m/s  MV medial e'           0.02 m/s  E/e' Ratio:            62.60      (<8.0)  PulmV Sys Ronny:         27.51 cm/s  PulmV Guardado Ronny:        58.47 cm/s  PulmV S/D Ronny:         0.47  PulmV A Revs Ronny:      50.24 cm/s  PulmV A Revs Dur:      96.89 msec       MITRAL VALVE:           Normal Ranges:  MV Vmax:      1.72 m/s  (<=1.3m/s)  MV peak P.9 mmHg (<5mmHg)  MV mean P.7 mmHg  (<2mmHg)  MV VTI:       39.60 cm  (10-13cm)  MV DT:        114 msec  (150-240msec)       MITRAL INSUFFICIENCY:             Normal Ranges:  MR VTI:               143.27 cm  MR Vmax:              433.04 cm/s       AORTIC VALVE:                     Normal Ranges:  AoV Vmax:                1.40 m/s (<=1.7m/s)  AoV Peak P.9 mmHg (<20mmHg)  AoV Mean P.1 mmHg (1.7-11.5mmHg)  LVOT Max Ronny:            0.65 m/s (<=1.1m/s)  AoV VTI:                 29.60 cm (18-25cm)  LVOT VTI:                11.62 cm  LVOT Diameter:           2.01 cm  (1.8-2.4cm)  AoV Area, VTI:           1.25  cm2 (2.5-5.5cm2)  AoV Area,Vmax:           1.47 cm2 (2.5-4.5cm2)  AoV Dimensionless Index: 0.39       RIGHT VENTRICLE:  RV Basal 3.80 cm  RV Mid   2.90 cm  RV Major 7.6 cm  TAPSE:   17.0 mm  RV s'    0.01 m/s       TRICUSPID VALVE/RVSP:          Normal Ranges:  Peak TR Velocity:     2.47 m/s  RV Syst Pressure:     38 mmHg  (< 30mmHg)  IVC Diam:             2.90 cm       PULMONARY VEINS:  PulmV A Revs Dur: 96.89 msec  PulmV A Revs Ronny: 50.24 cm/s  PulmV Guardado Ronny:   58.47 cm/s  PulmV S/D Ronny:    0.47  PulmV Sys Ronny:    27.51 cm/s       AORTA:  Asc Ao Diam 3.52 cm       [id number] Nelson Shin MD  Electronically signed on 3/7/2025 at 10:02:32 AM       ** Final **      Physical Exam  Vitals and nursing note reviewed.         Relevant Results                      This patient has a urinary catheter   Reason for the urinary catheter remaining today? urinary retention/bladder outlet obstruction, acute or chronic          Assessment & Plan  Urinary retention    DOYLE (acute kidney injury)    Hematuria    Anemia                   Gordo Stevens MD   Physician  Internal Medicine     H&P     Signed     Date of Service: 5/1/2025 12:22 PM     Signed       Expand All Collapse All    History Of Present Illness  Patrick Vázquez is a 89 y.o. male with past medical history significant for prostate cancer, hypertension, heart failure, COPD, diverticulosis, NSTEMI (2022) with stent placement, mitral regurgitation s/p MitraClip presenting to emergency department for evaluation of urinary retention.  Patient states that his urologist placed a Zarco catheter approximately 1 week ago due to urinary retention.  He states that his Zarco catheter was removed around noon and since then he had been unable to urinate.  Patient states he developed lower abdominal pain.  Patient denies nausea, vomiting, diarrhea, recent illness.  Patient denies chest pain or dizziness.  Patient states he is currently anticoagulated on Plavix, baby aspirin, and  Eliquis due to a mixup in his medications.  Patient was removed from his Plavix on 4/7/2025 by his cardiologist Dr. Shin, however the patient states that he never actually stopped taking the Plavix.  He called the cardiologist office on 4/25/2025 when his urologist questioned why he was on Plavix and Eliquis.  Patient stated at that time he would stop taking the Plavix however he now states that he is confused and is unsure if he actually stopped taking it.     In ED, a bladder scan was completed and revealed 440 mL of urine.  A Zarco catheter was inserted and the patient was initially going to be discharged home on Keflex prophylactically to follow-up with his urologist.  Upon discharging the patient he developed jeremiah hematuria which clogged his Zarco catheter.  Patient is currently requiring continuous bladder irrigation.  Basic labs obtained and ordered by me.  PT 17.2, INR 1.6, hemoglobin 11.0, hematocrit 33.4, platelets 138, glucose 106, BUN 43, creatinine 1.45, GFR 46.  Urinalysis showing dark brown turbid urine with 3+ blood and 75 leukocytes, no WBCs.  Consult placed to urology.  Blood pressure currently 119/56, heart rate 66, respirations 18, temperature 36.6 °C, SpO2 96% on room air.  Patient is a DNR/DNI.  Has pitting edema on his bilateral lower ankles, history of heart failure.  No IV fluids given.  Echocardiogram 3/6/2025 with an EF of 50 to 55%, mild AVR.  Patient will be admitted to telemetry under the care of Dr. Stevens who will continue to follow.  I was asked to do H&P and place initial admission orders.  PCP Dr. Sean Godwin.     Prior medical records reviewed by me.     Past Medical History  Prostate cancer, hypertension, CHF, cataract, COPD, diverticulosis, DJD, NSTEMI 2022, mitral regurgitation s/p MitraClip  Surgical History  MitraClip, hip replacement, prostatectomy, cardiac stent placement, cardiac catheterization     Social History  Former smoker, no drug use, social alcohol use,  ", lives at home with his wife  Family History  Reviewed and noncontributory     Allergies  Bee venom protein (honey bee)     Review of Systems   HENT:  Positive for congestion.    Respiratory:  Positive for shortness of breath.    Cardiovascular:  Positive for leg swelling.   Genitourinary:  Positive for decreased urine volume and difficulty urinating.   Neurological:  Positive for weakness.   All other systems reviewed and are negative.     A 10 point review of systems was completed and is negative except what is listed in HPI  Physical Exam  Constitutional:       Appearance: Normal appearance.   HENT:      Mouth/Throat:      Mouth: Mucous membranes are dry.      Pharynx: Oropharynx is clear.   Eyes:      Pupils: Pupils are equal, round, and reactive to light.   Cardiovascular:      Rate and Rhythm: Normal rate and regular rhythm.   Pulmonary:      Effort: Pulmonary effort is normal.      Breath sounds: Normal breath sounds.   Abdominal:      General: Bowel sounds are normal.      Palpations: Abdomen is soft.   Genitourinary:     Comments: Zarco catheter  Musculoskeletal:         General: Normal range of motion.      Cervical back: Normal range of motion.      Comments: \"Sock line\" pitting edema B/L ankles   Skin:     General: Skin is warm and dry.      Capillary Refill: Capillary refill takes less than 2 seconds.   Neurological:      General: No focal deficit present.      Mental Status: He is alert and oriented to person, place, and time.            Last Recorded Vitals  Blood pressure 98/51, pulse 68, temperature 36.6 °C (97.9 °F), resp. rate (!) 22, weight 70.3 kg (155 lb), SpO2 93%.     Relevant Results  No results found.        Results for orders placed or performed during the hospital encounter of 04/30/25 (from the past 24 hours)   Urinalysis with Reflex Culture and Microscopic   Result Value Ref Range     Color, Urine Dark-Brown (N) Light-Yellow, Yellow, Dark-Yellow     Appearance, Urine Ex.Turbid " (N) Clear     Specific Gravity, Urine 1.019 1.005 - 1.035     pH, Urine 7.0 5.0, 5.5, 6.0, 6.5, 7.0, 7.5, 8.0     Protein, Urine 300 (3+) (A) NEGATIVE, 10 (TRACE), 20 (TRACE) mg/dL     Glucose, Urine Normal Normal mg/dL     Blood, Urine OVER (3+) (A) NEGATIVE mg/dL     Ketones, Urine NEGATIVE NEGATIVE mg/dL     Bilirubin, Urine NEGATIVE NEGATIVE mg/dL     Urobilinogen, Urine Normal Normal mg/dL     Nitrite, Urine NEGATIVE NEGATIVE     Leukocyte Esterase, Urine 75 Connie/uL (A) NEGATIVE   Extra Urine Gray Tube   Result Value Ref Range     Extra Tube Hold for add-ons.     Microscopic Only, Urine   Result Value Ref Range     WBC, Urine 1-5 1-5, NONE /HPF     RBC, Urine >20 (A) NONE, 1-2, 3-5 /HPF   Coagulation Screen   Result Value Ref Range     Protime 17.2 (H) 9.8 - 12.4 seconds     INR 1.6 (H) 0.9 - 1.1     aPTT 29 26 - 36 seconds   CBC   Result Value Ref Range     WBC 6.3 4.4 - 11.3 x10*3/uL     nRBC 0.0 0.0 - 0.0 /100 WBCs     RBC 3.28 (L) 4.50 - 5.90 x10*6/uL     Hemoglobin 11.0 (L) 13.5 - 17.5 g/dL     Hematocrit 33.4 (L) 41.0 - 52.0 %      (H) 80 - 100 fL     MCH 33.5 26.0 - 34.0 pg     MCHC 32.9 32.0 - 36.0 g/dL     RDW 13.7 11.5 - 14.5 %     Platelets 138 (L) 150 - 450 x10*3/uL   Basic metabolic panel   Result Value Ref Range     Glucose 106 (H) 74 - 99 mg/dL     Sodium 137 136 - 145 mmol/L     Potassium 5.2 3.5 - 5.3 mmol/L     Chloride 104 98 - 107 mmol/L     Bicarbonate 24 21 - 32 mmol/L     Anion Gap 14 10 - 20 mmol/L     Urea Nitrogen 43 (H) 6 - 23 mg/dL     Creatinine 1.45 (H) 0.50 - 1.30 mg/dL     eGFR 46 (L) >60 mL/min/1.73m*2     Calcium 9.6 8.6 - 10.3 mg/dL         Assessment & Plan  Urinary retention     DOYLE (acute kidney injury)     Hematuria     Anemia        Patrick is a 89-year-old male patient presented to emergency department for evaluation of urinary retention.  Patient with a history of prostate cancer states he had a Zarco catheter placed 1 week ago and was removed at approximately noon  today.  Patient states that since then he has been unable to urinate and developed lower abdominal pain.  Patient with a bladder scan of 440 in ED.  Had a Paz catheter placed and was initially going to be discharged home on prophylactic Keflex with follow-up to his urologist.  Patient developed jeremiah hematuria which clogged his Paz catheter requiring continuous bladder irrigation.  Patient also has a medication mixup in which she has been taking Plavix, Eliquis, and baby aspirin daily.  Per medical records patient was removed from his Plavix on 4/7/2025 by his cardiologist.  There was a telephone conversation in which this was cleared up again on 4/25/2025 and the patient states he would take that Plavix out of his pillbox but he is unsure whether he did.  Consult placed to urology.  Labs obtained, patient found to have an DOYLE and anemia.  Vital signs within normal limits.  Patient admitted for further medical management.     Urinary retention/DOYLE/hematuria/anemia  Inpatient telemetry admission to Dr. Stevens  Paz/continuous bladder irrigation  Consult urology and appreciate input  Hold home anticoagulation including Eliquis, baby aspirin  Patient requiring medication education  Trend hemoglobin  No IV fluids for DOYLE 2/2 history of CHF  Hold home furosemide/Aldactone 2/2 DOYLE  Sock pitting edema noted to bilateral lower extremities  Cardiac/low-sodium diet  Tylenol as needed     Prostate cancer/HTN/CHF/COPD/diverticulosis/CAD/DJD  #Chronic conditions  Continue home atorvastatin 40 mg daily  Continue home inhalers  Hold home Eliquis, baby aspirin, Lasix, Aldactone  Attending to restart medications as appropriate  Echocardiogram on file 3/6/2025: EF 50 to 55%, mild AVR     DVT Ppx  SCDs  No chemical prophylaxis 2/2 hematuria  Up to chair with assistance             Chief Complaint   Patient presents with    Urinary Retention       Had paz removed this am, has not urinated since.         This is a 89 years old  male patient presented to the emergency department with a chief complaint of urinary retention.  Stated that today around noon he removed his Paz catheter and he could not pass urine since then.  Denies any flank pain, abdominal pain, nausea, vomiting, fever, chills, body aches, chest pain.     Review of system: As stated above in the HPI section.     had concerns including Urinary Retention (Had paz removed this am, has not urinated since.).        Problem List Items Addressed This Visit         * (Principal) Urinary retention - Primary     Relevant Medications     cephalexin (Keflex) 500 mg capsule     Other Relevant Orders     Referral to Urology     Hematuria         HPI         Urinary Retention     Additional comments: Had paz removed this am, has not urinated since.            Last edited by Javid Moya RN on 4/30/2025  8:26 PM.             Problem List as of 5/1/2025 Reviewed: 12/30/2024 11:23 PM by BONNIE Cristobal-CNP        Acquired spondylolisthesis of lumbosacral region     COPD (chronic obstructive pulmonary disease) (Multi)     Arthritis of left hip     Coronary artery disease     Diverticulosis     DJD (degenerative joint disease)     Dyslipidemia     HTN (hypertension)     Mitral valve insufficiency     Prostate CA (Multi)     S/P mitral valve clip implantation     Dyspnea on exertion     Cataract, nuclear sclerotic, both eyes     Contusion of hip     Eye strain, bilateral     History of malignant neoplasm of skin     History of repair of hip joint     Hyperopia of both eyes with regular astigmatism     Regular astigmatism of both eyes with presbyopia     Left hip pain     Low back pain     CHF (congestive heart failure), NYHA class II, acute on chronic, diastolic     * (Principal) Urinary retention     DOYLE (acute kidney injury)     Hematuria     Anemia              Active Ambulatory Problems     Diagnosis Date Noted    Acquired spondylolisthesis of lumbosacral region 02/20/2024    COPD  (chronic obstructive pulmonary disease) (Multi) 02/20/2024    Arthritis of left hip 02/20/2024    Coronary artery disease 02/20/2024    Diverticulosis 02/20/2024    DJD (degenerative joint disease) 02/20/2024    Dyslipidemia 02/20/2024    HTN (hypertension) 02/20/2024    Mitral valve insufficiency 02/20/2024    Prostate CA (Multi) 02/20/2024    S/P mitral valve clip implantation 02/20/2024    Dyspnea on exertion 02/20/2024    Cataract, nuclear sclerotic, both eyes 05/02/2023    Contusion of hip 03/01/2024    Eye strain, bilateral 03/01/2024    History of malignant neoplasm of skin 03/01/2024    History of repair of hip joint 03/01/2024    Hyperopia of both eyes with regular astigmatism 03/01/2024    Regular astigmatism of both eyes with presbyopia 03/01/2024    Left hip pain 03/01/2024    Low back pain 03/01/2024    CHF (congestive heart failure), NYHA class II, acute on chronic, diastolic 11/01/2024           Resolved Ambulatory Problems     Diagnosis Date Noted    Congestive heart failure 03/01/2024    Hyperlipoproteinemia 02/20/2024           Past Medical History:   Diagnosis Date    Cataract      CHF (congestive heart failure)      Hypertension      Personal history of malignant neoplasm of prostate      Personal history of other malignant neoplasm of skin                      Active Orders   Procedures     Irrigation of Bladder       Frequency: Once       Number of Occurrences: 1 Occurrences       Order Comments: Please proceed with continuous bladder irrigation.  Thank you      Diet     Adult diet Cardiac; 70 gm fat; 2 - 3 grams Sodium       Frequency: Effective now       Number of Occurrences: Until Specified   Nursing     Activity (specify) Activity With Exceptions; Up in chair       Frequency: Until discontinued       Number of Occurrences: Until Specified     Apply sequential compression device       Frequency: Until discontinued       Number of Occurrences: Until Specified     Height on admission        Frequency: Once       Number of Occurrences: 1 Occurrences     Maintain Urethral Catheter with Nurse Driven Indwelling Urethral Catheter Removal Protocol       Frequency: Until discontinued       Number of Occurrences: Until Specified     Maintain Urethral Catheter with Nurse Driven Indwelling Urethral Catheter Removal Protocol       Frequency: Until discontinued       Number of Occurrences: Until Specified     No Isolation Required       Frequency: Once       Number of Occurrences: 1 Occurrences     Notify provider       Frequency: Until discontinued       Number of Occurrences: Until Specified     Nursing Guidelines: Paz Cath       Frequency: Until discontinued       Number of Occurrences: Until Specified       Order Comments: 1. Perform a Post Voiding Residual (PVR) using the Bladder Volume Control Indicator (BVI) the first two voidings after a paz catheter is removed.  If the PVR is greater than 450cc perform straight cath.  If the patient requires a second straight cath procedure notify physician on the next two rounds.  2. If the patient has not voided after 8 hours check volume with the BVI and straight cath if over 450cc.  3. If no voiding at discharge reinsert indwelling urinary catheter and recommend referral to Urology Clinic at discharge.   4. Notify physician of indwelling urinary catheter replacement        Pain Assessment       Frequency: Per unit standards       Number of Occurrences: Until Specified     Physician Communication       Frequency: Until discontinued       Number of Occurrences: Until Specified       Order Comments: IF patient requires a second straight cath procedure after paz removed, notify physician on the next rounds        Vital Signs       Frequency: q4h       Number of Occurrences: Until Specified     Weight on admission       Frequency: Once       Number of Occurrences: 1 Occurrences   Consult     Inpatient consult to Social Work and TCC       Frequency: Once       Number  of Occurrences: 1 Occurrences     Inpatient consult to Urology       Frequency: Once       Number of Occurrences: 1 Occurrences   Nourishments     May Participate in Room Service       Frequency: Once       Number of Occurrences: 1 Occurrences   Respiratory Care     Respiratory care eval and treat       Frequency: Once       Number of Occurrences: 1 Occurrences   ECG     ECG 12 lead       Frequency: Once       Number of Occurrences: 1 Occurrences     Electrocardiogram, 12-lead PRN ACS symptoms       Frequency: PRN       Number of Occurrences: Until Specified       Order Comments: Notify provider if performed.      Telemetry     Telemetry monitoring - Floor only       Frequency: Until discontinued       Number of Occurrences: 3 Days   Medications     acetaminophen (Tylenol) tablet 650 mg       Frequency: q4h PRN       Dose: 650 mg       Route: oral     albuterol 90 mcg/actuation inhaler 2 puff       Frequency: q6h PRN       Dose: 2 puff       Route: inhalation     atorvastatin (Lipitor) tablet 40 mg       Frequency: Daily       Dose: 40 mg       Route: oral     fluticasone furoate-vilanteroL (Breo Ellipta) 200-25 mcg/dose inhaler 1 puff       Linked Order: And       Frequency: Daily       Dose: 1 puff       Route: inhalation     furosemide (Lasix) tablet 20 mg       Frequency: Every other day       Dose: 20 mg       Route: oral     spironolactone (Aldactone) tablet 12.5 mg       Frequency: Daily       Dose: 12.5 mg       Route: oral     tiotropium (Spiriva Respimat) 2.5 mcg/actuation inhaler 2 puff       Linked Order: And       Frequency: Daily       Dose: 2 puff       Route: inhalation      Dietary Orders (From admission, onward)          Start     Ordered     05/01/25 0213   May Participate in Room Service  ( ROOM SERVICE MAY PARTICIPATE)  Once        Question:  .  Answer:  Yes    05/01/25 0212     05/01/25 0012   Adult diet Cardiac; 70 gm fat; 2 - 3 grams Sodium  Diet effective now        Question Answer Comment    Diet type Cardiac     Fat restriction: 70 gm fat     Sodium restriction: 2 - 3 grams Sodium         05/01/25 0011                       89 yrs@  ADMITDTTM@  Urinary retention [R33.9]  Hematuria, unspecified type [R31.9]  [unfilled]  Weight: 70.3 kg (155 lb)     Vitals          Vitals:     04/30/25 2024 04/30/25 2100 04/30/25 2200 05/01/25 0209   BP: 119/56 116/64   107/67   Pulse: 83 56 66 58   Resp: (!) 22         Temp: 36.6 °C (97.9 °F)     36.5 °C (97.7 °F)   SpO2: 99% 100% 98% 98%   Weight: 70.3 kg (155 lb)           05/01/25 0351 05/01/25 0800 05/01/25 1129   BP: 109/67 112/61 98/51   Pulse: 57 60 68   Resp:         Temp: 36.3 °C (97.3 °F) 36.4 °C (97.5 °F) 36.6 °C (97.9 °F)   SpO2: 97% (!) 89% 93%   Weight:                        Chief Complaint    Urinary Retention            Patient seen resting in bed with head of bed elevated, no s/s or c/o acute difficulties at this time.  Vital signs for last 24 hours Temp:  [36.3 °C (97.3 °F)-36.6 °C (97.9 °F)] 36.6 °C (97.9 °F)  Heart Rate:  [56-83] 68  Resp:  [22] 22  BP: ()/(51-67) 98/51    I/O this shift:  In: -   Out: 600 [Urine:600]  Patient still requiring frequent cardiac and SPO2 monitoring. Continue aggressive pulmonary hygiene and oral hygiene. Off loading as tolerated for skin integrity. Medications and labs reviewed-    Patient recently received an antibiotic (last 12 hours)         None                    Results for orders placed or performed during the hospital encounter of 04/30/25 (from the past 96 hours)   Urinalysis with Reflex Culture and Microscopic   Result Value Ref Range     Color, Urine Dark-Brown (N) Light-Yellow, Yellow, Dark-Yellow     Appearance, Urine Ex.Turbid (N) Clear     Specific Gravity, Urine 1.019 1.005 - 1.035     pH, Urine 7.0 5.0, 5.5, 6.0, 6.5, 7.0, 7.5, 8.0     Protein, Urine 300 (3+) (A) NEGATIVE, 10 (TRACE), 20 (TRACE) mg/dL     Glucose, Urine Normal Normal mg/dL     Blood, Urine OVER (3+) (A) NEGATIVE mg/dL      Ketones, Urine NEGATIVE NEGATIVE mg/dL     Bilirubin, Urine NEGATIVE NEGATIVE mg/dL     Urobilinogen, Urine Normal Normal mg/dL     Nitrite, Urine NEGATIVE NEGATIVE     Leukocyte Esterase, Urine 75 Ocnnie/uL (A) NEGATIVE   Extra Urine Gray Tube   Result Value Ref Range     Extra Tube Hold for add-ons.     Microscopic Only, Urine   Result Value Ref Range     WBC, Urine 1-5 1-5, NONE /HPF     RBC, Urine >20 (A) NONE, 1-2, 3-5 /HPF   Coagulation Screen   Result Value Ref Range     Protime 17.2 (H) 9.8 - 12.4 seconds     INR 1.6 (H) 0.9 - 1.1     aPTT 29 26 - 36 seconds   CBC   Result Value Ref Range     WBC 6.3 4.4 - 11.3 x10*3/uL     nRBC 0.0 0.0 - 0.0 /100 WBCs     RBC 3.28 (L) 4.50 - 5.90 x10*6/uL     Hemoglobin 11.0 (L) 13.5 - 17.5 g/dL     Hematocrit 33.4 (L) 41.0 - 52.0 %      (H) 80 - 100 fL     MCH 33.5 26.0 - 34.0 pg     MCHC 32.9 32.0 - 36.0 g/dL     RDW 13.7 11.5 - 14.5 %     Platelets 138 (L) 150 - 450 x10*3/uL   Basic metabolic panel   Result Value Ref Range     Glucose 106 (H) 74 - 99 mg/dL     Sodium 137 136 - 145 mmol/L     Potassium 5.2 3.5 - 5.3 mmol/L     Chloride 104 98 - 107 mmol/L     Bicarbonate 24 21 - 32 mmol/L     Anion Gap 14 10 - 20 mmol/L     Urea Nitrogen 43 (H) 6 - 23 mg/dL     Creatinine 1.45 (H) 0.50 - 1.30 mg/dL     eGFR 46 (L) >60 mL/min/1.73m*2     Calcium 9.6 8.6 - 10.3 mg/dL      Patient fully evaluated 05/01, thorough record review performed of previous labs and notes from prior encounters. Plan discussed with interdisciplinary team, consults placed, appreciate input. Will continue current and repeat labs in the AM.       Discharge planning discussed with patient and care team. Therapy evaluations ordered. Patient aware and agreeable to current plan, continue plan as above.      I spent a total of 75 minutes on the date of the service which included preparing to see the patient, face-to-face patient care, completing clinical documentation, obtaining and/or reviewing separately  obtained history, performing a medically appropriate examination, counseling and educating the patient/family/caregiver, ordering medications, tests, or procedures, communicating with other HCPs (not separately reported), independently interpreting results (not separately reported), communicating results to the patient/family/caregiver, and care coordination (not separately reported).            Sangeetha Dumont                   Revision History       Sangeetha Dumont

## 2025-05-02 NOTE — CARE PLAN
The clinical goals for the shift include remain hemodynamically stable      Problem: Pain - Adult  Goal: Verbalizes/displays adequate comfort level or baseline comfort level  Outcome: Progressing  Flowsheets (Taken 5/2/2025 0104)  Verbalizes/displays adequate comfort level or baseline comfort level: Encourage patient to monitor pain and request assistance     Problem: Safety - Adult  Goal: Free from fall injury  Outcome: Progressing  Flowsheets (Taken 5/2/2025 0104)  Free from fall injury: Instruct family/caregiver on patient safety     Problem: Discharge Planning  Goal: Discharge to home or other facility with appropriate resources  Outcome: Progressing  Flowsheets (Taken 5/2/2025 0104)  Discharge to home or other facility with appropriate resources: Identify barriers to discharge with patient and caregiver     Problem: Chronic Conditions and Co-morbidities  Goal: Patient's chronic conditions and co-morbidity symptoms are monitored and maintained or improved  Outcome: Progressing  Flowsheets (Taken 5/2/2025 0104)  Care Plan - Patient's Chronic Conditions and Co-Morbidity Symptoms are Monitored and Maintained or Improved: Monitor and assess patient's chronic conditions and comorbid symptoms for stability, deterioration, or improvement     Problem: Nutrition  Goal: Nutrient intake appropriate for maintaining nutritional needs  Outcome: Progressing     Problem: Fall/Injury  Goal: Not fall by end of shift  Outcome: Progressing

## 2025-05-03 LAB
ALBUMIN SERPL BCP-MCNC: 3.6 G/DL (ref 3.4–5)
ALP SERPL-CCNC: 90 U/L (ref 33–136)
ALT SERPL W P-5'-P-CCNC: 17 U/L (ref 10–52)
ANION GAP SERPL CALC-SCNC: 11 MMOL/L (ref 10–20)
AST SERPL W P-5'-P-CCNC: 25 U/L (ref 9–39)
BASOPHILS # BLD AUTO: 0.03 X10*3/UL (ref 0–0.1)
BASOPHILS NFR BLD AUTO: 0.6 %
BILIRUB SERPL-MCNC: 1 MG/DL (ref 0–1.2)
BUN SERPL-MCNC: 29 MG/DL (ref 6–23)
CALCIUM SERPL-MCNC: 9 MG/DL (ref 8.6–10.3)
CHLORIDE SERPL-SCNC: 105 MMOL/L (ref 98–107)
CO2 SERPL-SCNC: 26 MMOL/L (ref 21–32)
CREAT SERPL-MCNC: 1.11 MG/DL (ref 0.5–1.3)
EGFRCR SERPLBLD CKD-EPI 2021: 63 ML/MIN/1.73M*2
EOSINOPHIL # BLD AUTO: 0.16 X10*3/UL (ref 0–0.4)
EOSINOPHIL NFR BLD AUTO: 3.1 %
ERYTHROCYTE [DISTWIDTH] IN BLOOD BY AUTOMATED COUNT: 13.5 % (ref 11.5–14.5)
GLUCOSE SERPL-MCNC: 106 MG/DL (ref 74–99)
HCT VFR BLD AUTO: 31.1 % (ref 41–52)
HGB BLD-MCNC: 10.3 G/DL (ref 13.5–17.5)
IMM GRANULOCYTES # BLD AUTO: 0.01 X10*3/UL (ref 0–0.5)
IMM GRANULOCYTES NFR BLD AUTO: 0.2 % (ref 0–0.9)
LYMPHOCYTES # BLD AUTO: 1.16 X10*3/UL (ref 0.8–3)
LYMPHOCYTES NFR BLD AUTO: 22.2 %
MAGNESIUM SERPL-MCNC: 1.94 MG/DL (ref 1.6–2.4)
MCH RBC QN AUTO: 33.4 PG (ref 26–34)
MCHC RBC AUTO-ENTMCNC: 33.1 G/DL (ref 32–36)
MCV RBC AUTO: 101 FL (ref 80–100)
MONOCYTES # BLD AUTO: 0.61 X10*3/UL (ref 0.05–0.8)
MONOCYTES NFR BLD AUTO: 11.7 %
NEUTROPHILS # BLD AUTO: 3.26 X10*3/UL (ref 1.6–5.5)
NEUTROPHILS NFR BLD AUTO: 62.2 %
NRBC BLD-RTO: 0 /100 WBCS (ref 0–0)
PLATELET # BLD AUTO: 134 X10*3/UL (ref 150–450)
POTASSIUM SERPL-SCNC: 4.9 MMOL/L (ref 3.5–5.3)
PROT SERPL-MCNC: 5.8 G/DL (ref 6.4–8.2)
RBC # BLD AUTO: 3.08 X10*6/UL (ref 4.5–5.9)
SODIUM SERPL-SCNC: 137 MMOL/L (ref 136–145)
WBC # BLD AUTO: 5.2 X10*3/UL (ref 4.4–11.3)

## 2025-05-03 PROCEDURE — 85025 COMPLETE CBC W/AUTO DIFF WBC: CPT | Performed by: INTERNAL MEDICINE

## 2025-05-03 PROCEDURE — 2500000001 HC RX 250 WO HCPCS SELF ADMINISTERED DRUGS (ALT 637 FOR MEDICARE OP)

## 2025-05-03 PROCEDURE — 80053 COMPREHEN METABOLIC PANEL: CPT | Performed by: INTERNAL MEDICINE

## 2025-05-03 PROCEDURE — 99222 1ST HOSP IP/OBS MODERATE 55: CPT | Performed by: INTERNAL MEDICINE

## 2025-05-03 PROCEDURE — 2500000001 HC RX 250 WO HCPCS SELF ADMINISTERED DRUGS (ALT 637 FOR MEDICARE OP): Performed by: NURSE PRACTITIONER

## 2025-05-03 PROCEDURE — 83735 ASSAY OF MAGNESIUM: CPT

## 2025-05-03 PROCEDURE — 2500000001 HC RX 250 WO HCPCS SELF ADMINISTERED DRUGS (ALT 637 FOR MEDICARE OP): Performed by: PHYSICIAN ASSISTANT

## 2025-05-03 PROCEDURE — 99232 SBSQ HOSP IP/OBS MODERATE 35: CPT

## 2025-05-03 PROCEDURE — 36415 COLL VENOUS BLD VENIPUNCTURE: CPT | Performed by: INTERNAL MEDICINE

## 2025-05-03 PROCEDURE — 2060000001 HC INTERMEDIATE ICU ROOM DAILY

## 2025-05-03 PROCEDURE — 51702 INSERT TEMP BLADDER CATH: CPT

## 2025-05-03 PROCEDURE — 94640 AIRWAY INHALATION TREATMENT: CPT

## 2025-05-03 PROCEDURE — 2500000004 HC RX 250 GENERAL PHARMACY W/ HCPCS (ALT 636 FOR OP/ED): Mod: JZ | Performed by: INTERNAL MEDICINE

## 2025-05-03 RX ORDER — TRAMADOL HYDROCHLORIDE 50 MG/1
25 TABLET ORAL EVERY 6 HOURS PRN
Status: ACTIVE | OUTPATIENT
Start: 2025-05-03

## 2025-05-03 RX ORDER — MORPHINE SULFATE 2 MG/ML
2 INJECTION, SOLUTION INTRAMUSCULAR; INTRAVENOUS ONCE
Status: ACTIVE | OUTPATIENT
Start: 2025-05-03

## 2025-05-03 RX ORDER — FINASTERIDE 5 MG/1
5 TABLET, FILM COATED ORAL DAILY
Status: DISPENSED | OUTPATIENT
Start: 2025-05-03

## 2025-05-03 RX ORDER — TRAMADOL HYDROCHLORIDE 50 MG/1
50 TABLET ORAL EVERY 8 HOURS PRN
Status: DISPENSED | OUTPATIENT
Start: 2025-05-03

## 2025-05-03 RX ADMIN — CEFTRIAXONE SODIUM 1 G: 1 INJECTION, SOLUTION INTRAVENOUS at 22:59

## 2025-05-03 RX ADMIN — TRAMADOL HYDROCHLORIDE 50 MG: 50 TABLET, COATED ORAL at 12:00

## 2025-05-03 RX ADMIN — ATORVASTATIN CALCIUM 40 MG: 40 TABLET, FILM COATED ORAL at 10:18

## 2025-05-03 RX ADMIN — FLUTICASONE FUROATE AND VILANTEROL TRIFENATATE 1 PUFF: 200; 25 POWDER RESPIRATORY (INHALATION) at 07:27

## 2025-05-03 RX ADMIN — FINASTERIDE 5 MG: 5 TABLET, FILM COATED ORAL at 10:18

## 2025-05-03 RX ADMIN — TIOTROPIUM BROMIDE INHALATION SPRAY 2 PUFF: 3.12 SPRAY, METERED RESPIRATORY (INHALATION) at 07:25

## 2025-05-03 SDOH — ECONOMIC STABILITY: FOOD INSECURITY: WITHIN THE PAST 12 MONTHS, THE FOOD YOU BOUGHT JUST DIDN'T LAST AND YOU DIDN'T HAVE MONEY TO GET MORE.: NEVER TRUE

## 2025-05-03 SDOH — ECONOMIC STABILITY: INCOME INSECURITY: IN THE PAST 12 MONTHS HAS THE ELECTRIC, GAS, OIL, OR WATER COMPANY THREATENED TO SHUT OFF SERVICES IN YOUR HOME?: NO

## 2025-05-03 SDOH — SOCIAL STABILITY: SOCIAL INSECURITY: WITHIN THE LAST YEAR, HAVE YOU BEEN AFRAID OF YOUR PARTNER OR EX-PARTNER?: NO

## 2025-05-03 SDOH — ECONOMIC STABILITY: FOOD INSECURITY: WITHIN THE PAST 12 MONTHS, YOU WORRIED THAT YOUR FOOD WOULD RUN OUT BEFORE YOU GOT THE MONEY TO BUY MORE.: NEVER TRUE

## 2025-05-03 SDOH — SOCIAL STABILITY: SOCIAL INSECURITY: WITHIN THE LAST YEAR, HAVE YOU BEEN HUMILIATED OR EMOTIONALLY ABUSED IN OTHER WAYS BY YOUR PARTNER OR EX-PARTNER?: NO

## 2025-05-03 ASSESSMENT — PAIN SCALES - GENERAL
PAINLEVEL_OUTOF10: 0 - NO PAIN
PAINLEVEL_OUTOF10: 2
PAINLEVEL_OUTOF10: 0 - NO PAIN

## 2025-05-03 ASSESSMENT — COGNITIVE AND FUNCTIONAL STATUS - GENERAL
CLIMB 3 TO 5 STEPS WITH RAILING: A LITTLE
DRESSING REGULAR LOWER BODY CLOTHING: A LITTLE
TOILETING: A LITTLE
MOVING TO AND FROM BED TO CHAIR: A LITTLE
DRESSING REGULAR LOWER BODY CLOTHING: A LITTLE
STANDING UP FROM CHAIR USING ARMS: A LITTLE
TURNING FROM BACK TO SIDE WHILE IN FLAT BAD: A LITTLE
CLIMB 3 TO 5 STEPS WITH RAILING: A LOT
DRESSING REGULAR UPPER BODY CLOTHING: A LITTLE
MOVING FROM LYING ON BACK TO SITTING ON SIDE OF FLAT BED WITH BEDRAILS: A LITTLE
STANDING UP FROM CHAIR USING ARMS: A LITTLE
PERSONAL GROOMING: A LITTLE
HELP NEEDED FOR BATHING: A LITTLE
HELP NEEDED FOR BATHING: A LITTLE
MOVING TO AND FROM BED TO CHAIR: A LITTLE
DAILY ACTIVITIY SCORE: 19
DAILY ACTIVITIY SCORE: 20
WALKING IN HOSPITAL ROOM: A LITTLE
MOBILITY SCORE: 19
TURNING FROM BACK TO SIDE WHILE IN FLAT BAD: A LITTLE
WALKING IN HOSPITAL ROOM: A LITTLE
MOBILITY SCORE: 17
TOILETING: A LITTLE
DRESSING REGULAR UPPER BODY CLOTHING: A LITTLE

## 2025-05-03 ASSESSMENT — ACTIVITIES OF DAILY LIVING (ADL): LACK_OF_TRANSPORTATION: NO

## 2025-05-03 ASSESSMENT — PAIN - FUNCTIONAL ASSESSMENT
PAIN_FUNCTIONAL_ASSESSMENT: 0-10

## 2025-05-03 ASSESSMENT — PAIN DESCRIPTION - LOCATION: LOCATION: PENIS

## 2025-05-03 NOTE — NURSING NOTE
With rounds, patient resting in bed without distress.  Patient 3 way bladder irrigation running at moderate rate with return of bright red blood, no clots noted at this time.  Patient in no distress, no complaints of pain or pressure to bladder at this time.

## 2025-05-03 NOTE — PROGRESS NOTES
Patrick Vázquez is a 89 y.o. male on day 2 of admission presenting with Urinary retention.      Subjective   Patient fully evaluated  05/02  for    Problem List Items Addressed This Visit          Genitourinary and Reproductive    * (Principal) Urinary retention - Primary    Relevant Medications    cephalexin (Keflex) 500 mg capsule    Other Relevant Orders    Referral to Urology    Hematuria     Patient seen resting in bed with head of bed elevated, no s/s or c/o acute difficulties at this time.  Vital signs for last 24 hours Temp:  [36 °C (96.8 °F)-36.7 °C (98.1 °F)] 36.7 °C (98.1 °F)  Heart Rate:  [52-89] 66  Resp:  [16-18] 18  BP: (109-130)/(59-80) 130/70  FiO2 (%):  [21 %] 21 %    I/O this shift:  In: -   Out: 1000 [Urine:1000]  Patient still requiring frequent cardiac and SPO2 monitoring. Continue aggressive pulmonary hygiene and oral hygiene. Off loading as tolerated for skin integrity. Medications and labs reviewed-   Results for orders placed or performed during the hospital encounter of 04/30/25 (from the past 24 hours)   Comprehensive Metabolic Panel   Result Value Ref Range    Glucose 106 (H) 74 - 99 mg/dL    Sodium 137 136 - 145 mmol/L    Potassium 4.9 3.5 - 5.3 mmol/L    Chloride 105 98 - 107 mmol/L    Bicarbonate 26 21 - 32 mmol/L    Anion Gap 11 10 - 20 mmol/L    Urea Nitrogen 29 (H) 6 - 23 mg/dL    Creatinine 1.11 0.50 - 1.30 mg/dL    eGFR 63 >60 mL/min/1.73m*2    Calcium 9.0 8.6 - 10.3 mg/dL    Albumin 3.6 3.4 - 5.0 g/dL    Alkaline Phosphatase 90 33 - 136 U/L    Total Protein 5.8 (L) 6.4 - 8.2 g/dL    AST 25 9 - 39 U/L    Bilirubin, Total 1.0 0.0 - 1.2 mg/dL    ALT 17 10 - 52 U/L   CBC and Auto Differential   Result Value Ref Range    WBC 5.2 4.4 - 11.3 x10*3/uL    nRBC 0.0 0.0 - 0.0 /100 WBCs    RBC 3.08 (L) 4.50 - 5.90 x10*6/uL    Hemoglobin 10.3 (L) 13.5 - 17.5 g/dL    Hematocrit 31.1 (L) 41.0 - 52.0 %     (H) 80 - 100 fL    MCH 33.4 26.0 - 34.0 pg    MCHC 33.1 32.0 - 36.0 g/dL    RDW 13.5  11.5 - 14.5 %    Platelets 134 (L) 150 - 450 x10*3/uL    Neutrophils % 62.2 40.0 - 80.0 %    Immature Granulocytes %, Automated 0.2 0.0 - 0.9 %    Lymphocytes % 22.2 13.0 - 44.0 %    Monocytes % 11.7 2.0 - 10.0 %    Eosinophils % 3.1 0.0 - 6.0 %    Basophils % 0.6 0.0 - 2.0 %    Neutrophils Absolute 3.26 1.60 - 5.50 x10*3/uL    Immature Granulocytes Absolute, Automated 0.01 0.00 - 0.50 x10*3/uL    Lymphocytes Absolute 1.16 0.80 - 3.00 x10*3/uL    Monocytes Absolute 0.61 0.05 - 0.80 x10*3/uL    Eosinophils Absolute 0.16 0.00 - 0.40 x10*3/uL    Basophils Absolute 0.03 0.00 - 0.10 x10*3/uL   Magnesium   Result Value Ref Range    Magnesium 1.94 1.60 - 2.40 mg/dL      Patient recently received an antibiotic (last 12 hours)       None           Plan discussed with interdisciplinary team, paz with CBI to continue, hematuria still present, no clots, titrate to clear pink per urology, appreciate input. Patient hemoglobin slight drop overnight @ 10.2 from 11.0, creatinine improved @ 1.6, will continue to trend, continue current and repeat labs in the AM.     Discharge planning discussed with patient and care team. Therapy evaluations ordered. Anticipate HHC/SNF at discharge. Patient aware and agreeable to current plan, continue plan as above.     I spent a total of 60 minutes on the date of the service which included preparing to see the patient, face-to-face patient care, completing clinical documentation, obtaining and/or reviewing separately obtained history, performing a medically appropriate examination, counseling and educating the patient/family/caregiver, ordering medications, tests, or procedures, communicating with other HCPs (not separately reported), independently interpreting results (not separately reported), communicating results to the patient/family/caregiver, and care coordination (not separately reported).        Objective     Last Recorded Vitals  /70 (BP Location: Right arm, Patient Position:  Lying)   Pulse 66   Temp 36.7 °C (98.1 °F) (Temporal)   Resp 18   Wt 68 kg (150 lb)   SpO2 97%   Intake/Output last 3 Shifts:    Intake/Output Summary (Last 24 hours) at 5/3/2025 1522  Last data filed at 5/3/2025 1144  Gross per 24 hour   Intake 0 ml   Output 2400 ml   Net -2400 ml       Admission Weight  Weight: 70.3 kg (155 lb) (04/30/25 2024)    Daily Weight  05/03/25 : 68 kg (150 lb)    Image Results  CT abdomen pelvis wo IV contrast  Narrative: Interpreted By:  Raheem Cummins,   STUDY:  CT ABDOMEN PELVIS WO IV CONTRAST;  5/2/2025 9:01 pm      INDICATION:  Signs/Symptoms:hematuria.          COMPARISON:  CT abdomen and pelvis without contrast 30 January 2025      ACCESSION NUMBER(S):  [id number]      ORDERING CLINICIAN:  MAURA EWING      TECHNIQUE:  CT of the abdomen and pelvis from the lung bases through the  symphysis pubis without oral or IV contrast      FINDINGS:  LOWER CHEST: Small free-flowing bilateral pleural effusions both new  from 30 January 2025. Unchanged cardiomegaly      BONES: No acute skeletal findings.      RIGHT KIDNEY AND URETER:  Radiodense stone: Two unchanged less than 3 mm (too small to  accurately measure attenuation) nonobstructing renal stones, one at  each pole. No new stone. None in the ureter noting the distal most  several cm of the ureter are completely obscured by streak artifact  from the hip prostheses Hydronephrosis: Negative  Congenital variant anatomy: Negative. No duplicated ureter or other  variant anatomy. Other: Large but simple parapelvic cyst or cysts  unchanged      LEFT KIDNEY AND URETER:  Radiodense stone: None, noting distal ureter obscured by streak  artifact, but the left kidney had no stone on last CT, still does not  Other: Decompressed by well-positioned Zarco catheter      URINARY BLADDER:  Radiodense stone: Negative  Wall thickening / other inflammatory change: Negative  Diverticula: Negative  Congenital variant anatomy: Negative. No variant anatomy  such as  urachal remnant. Other: n/a      LIVER: Normal. No enlargement or evidence of cirrhosis or fatty  change. No gross evidence of a mass, noting low sensitivity and  specificity without IV contrast.      SPLEEN: Normal. No enlargement.      PANCREAS: Normal. No CT evidence of acute or chronic pancreatitis. No  obvious  duct dilation. No gross evidence of a mass, noting low  sensitivity and specificity without IV contrast.      GALLBLADDER: Normal CT appearance. No dilation, calcified, or  gas-containing stones.  Other types of gallstones could be occult on  CT and detectable only by ultrasound.      BILE DUCTS: Normal. No biliary duct dilation.      ADRENAL GLANDS: Unchanged symmetric mild thickening      LYMPH NODES: No adenopathy, intraperitoneal, retroperitoneal, pelvic,  inguinal or otherwise.      APPENDIX: Normal.  Not dilated, thick walled or in any other way  inflamed in appearance.  No inflammatory change about the appendix.      COLON: Normal. No sign of acute diverticulitis or other colitis. No  annular constricting mass.      SMALL BOWEL: Normal. No small bowel dilation or any other sign of  small bowel obstruction.      STOMACH / DUODENUM: Grossly normal by CT which has limited  sensitivity and specificity for the stomach and duodenum.      RETROPERITONEUM: Normal.  No acute hemorrhage or inflammatory change.  Lymph nodes in a separate dedicated section.      OMENTUM, MESENTERY AND PERITONEAL SPACES:  Free intraperitoneal air: Negative  Free intraperitoneal fluid: Negative  Abscess: Negative  Other: Mild diffuse edema      PELVIS: Lower pelvis obscured by streak artifact from bilateral hip  prostheses      VASCULATURE: Aortic and iliac atherosclerotic calcifications without  aneurysm or other acute finding.      ABDOMINAL WALL:  Hernia: Negative  Other: Subcutaneous edema      Impression: No acute new process in the urinary tract or anywhere else in the  abdomen or pelvis when compared to 30  January 2025      Same two tiny nonobstructing right renal stones, one at each pole      No new stone anywhere in the urinary tract      No hydroureteronephrosis on either side, only a large but simple  parapelvic cysts at the right renal hilus, a common artifactual mimic  for hydronephrosis      Urinary bladder completely decompressed by well-positioned Zarco  catheter      Diffuse at least mild edema (subcutaneous, mesenteric, and trace deep  pelvic free fluid)      New small bilateral pleural effusions      MACRO:  None      Signed by: Raheem Cummins 5/3/2025 8:17 AM  Dictation workstation:   [id number]      Physical Exam  Vitals and nursing note reviewed.         Relevant Results               This patient currently has cardiac telemetry ordered; if you would like to modify or discontinue the telemetry order, click here to go to the orders activity to modify/discontinue the order.      This patient has a urinary catheter   Reason for the urinary catheter remaining today? urinary retention/bladder outlet obstruction, acute or chronic          Assessment & Plan  Urinary retention    DOYLE (acute kidney injury)    Hematuria    Anemia                   Gordo Stevens MD   Physician  Internal Medicine     H&P     Signed     Date of Service: 5/1/2025 12:22 PM     Signed       Expand All Collapse All    History Of Present Illness  Patrick Vázquez is a 89 y.o. male with past medical history significant for prostate cancer, hypertension, heart failure, COPD, diverticulosis, NSTEMI (2022) with stent placement, mitral regurgitation s/p MitraClip presenting to emergency department for evaluation of urinary retention.  Patient states that his urologist placed a Zarco catheter approximately 1 week ago due to urinary retention.  He states that his Zarco catheter was removed around noon and since then he had been unable to urinate.  Patient states he developed lower abdominal pain.  Patient denies nausea, vomiting, diarrhea,  recent illness.  Patient denies chest pain or dizziness.  Patient states he is currently anticoagulated on Plavix, baby aspirin, and Eliquis due to a mixup in his medications.  Patient was removed from his Plavix on 4/7/2025 by his cardiologist Dr. Shin, however the patient states that he never actually stopped taking the Plavix.  He called the cardiologist office on 4/25/2025 when his urologist questioned why he was on Plavix and Eliquis.  Patient stated at that time he would stop taking the Plavix however he now states that he is confused and is unsure if he actually stopped taking it.     In ED, a bladder scan was completed and revealed 440 mL of urine.  A Zarco catheter was inserted and the patient was initially going to be discharged home on Keflex prophylactically to follow-up with his urologist.  Upon discharging the patient he developed jeremiah hematuria which clogged his Zarco catheter.  Patient is currently requiring continuous bladder irrigation.  Basic labs obtained and ordered by me.  PT 17.2, INR 1.6, hemoglobin 11.0, hematocrit 33.4, platelets 138, glucose 106, BUN 43, creatinine 1.45, GFR 46.  Urinalysis showing dark brown turbid urine with 3+ blood and 75 leukocytes, no WBCs.  Consult placed to urology.  Blood pressure currently 119/56, heart rate 66, respirations 18, temperature 36.6 °C, SpO2 96% on room air.  Patient is a DNR/DNI.  Has pitting edema on his bilateral lower ankles, history of heart failure.  No IV fluids given.  Echocardiogram 3/6/2025 with an EF of 50 to 55%, mild AVR.  Patient will be admitted to telemetry under the care of Dr. Stevens who will continue to follow.  I was asked to do H&P and place initial admission orders.  PCP Dr. Sean Godwin.     Prior medical records reviewed by me.     Past Medical History  Prostate cancer, hypertension, CHF, cataract, COPD, diverticulosis, DJD, NSTEMI 2022, mitral regurgitation s/p MitraClip  Surgical History  MitraClip, hip replacement,  "prostatectomy, cardiac stent placement, cardiac catheterization     Social History  Former smoker, no drug use, social alcohol use, , lives at home with his wife  Family History  Reviewed and noncontributory     Allergies  Bee venom protein (honey bee)     Review of Systems   HENT:  Positive for congestion.    Respiratory:  Positive for shortness of breath.    Cardiovascular:  Positive for leg swelling.   Genitourinary:  Positive for decreased urine volume and difficulty urinating.   Neurological:  Positive for weakness.   All other systems reviewed and are negative.     A 10 point review of systems was completed and is negative except what is listed in HPI  Physical Exam  Constitutional:       Appearance: Normal appearance.   HENT:      Mouth/Throat:      Mouth: Mucous membranes are dry.      Pharynx: Oropharynx is clear.   Eyes:      Pupils: Pupils are equal, round, and reactive to light.   Cardiovascular:      Rate and Rhythm: Normal rate and regular rhythm.   Pulmonary:      Effort: Pulmonary effort is normal.      Breath sounds: Normal breath sounds.   Abdominal:      General: Bowel sounds are normal.      Palpations: Abdomen is soft.   Genitourinary:     Comments: Zarco catheter  Musculoskeletal:         General: Normal range of motion.      Cervical back: Normal range of motion.      Comments: \"Sock line\" pitting edema B/L ankles   Skin:     General: Skin is warm and dry.      Capillary Refill: Capillary refill takes less than 2 seconds.   Neurological:      General: No focal deficit present.      Mental Status: He is alert and oriented to person, place, and time.            Last Recorded Vitals  Blood pressure 98/51, pulse 68, temperature 36.6 °C (97.9 °F), resp. rate (!) 22, weight 70.3 kg (155 lb), SpO2 93%.     Relevant Results  No results found.        Results for orders placed or performed during the hospital encounter of 04/30/25 (from the past 24 hours)   Urinalysis with Reflex Culture and " Microscopic   Result Value Ref Range     Color, Urine Dark-Brown (N) Light-Yellow, Yellow, Dark-Yellow     Appearance, Urine Ex.Turbid (N) Clear     Specific Gravity, Urine 1.019 1.005 - 1.035     pH, Urine 7.0 5.0, 5.5, 6.0, 6.5, 7.0, 7.5, 8.0     Protein, Urine 300 (3+) (A) NEGATIVE, 10 (TRACE), 20 (TRACE) mg/dL     Glucose, Urine Normal Normal mg/dL     Blood, Urine OVER (3+) (A) NEGATIVE mg/dL     Ketones, Urine NEGATIVE NEGATIVE mg/dL     Bilirubin, Urine NEGATIVE NEGATIVE mg/dL     Urobilinogen, Urine Normal Normal mg/dL     Nitrite, Urine NEGATIVE NEGATIVE     Leukocyte Esterase, Urine 75 Connie/uL (A) NEGATIVE   Extra Urine Gray Tube   Result Value Ref Range     Extra Tube Hold for add-ons.     Microscopic Only, Urine   Result Value Ref Range     WBC, Urine 1-5 1-5, NONE /HPF     RBC, Urine >20 (A) NONE, 1-2, 3-5 /HPF   Coagulation Screen   Result Value Ref Range     Protime 17.2 (H) 9.8 - 12.4 seconds     INR 1.6 (H) 0.9 - 1.1     aPTT 29 26 - 36 seconds   CBC   Result Value Ref Range     WBC 6.3 4.4 - 11.3 x10*3/uL     nRBC 0.0 0.0 - 0.0 /100 WBCs     RBC 3.28 (L) 4.50 - 5.90 x10*6/uL     Hemoglobin 11.0 (L) 13.5 - 17.5 g/dL     Hematocrit 33.4 (L) 41.0 - 52.0 %      (H) 80 - 100 fL     MCH 33.5 26.0 - 34.0 pg     MCHC 32.9 32.0 - 36.0 g/dL     RDW 13.7 11.5 - 14.5 %     Platelets 138 (L) 150 - 450 x10*3/uL   Basic metabolic panel   Result Value Ref Range     Glucose 106 (H) 74 - 99 mg/dL     Sodium 137 136 - 145 mmol/L     Potassium 5.2 3.5 - 5.3 mmol/L     Chloride 104 98 - 107 mmol/L     Bicarbonate 24 21 - 32 mmol/L     Anion Gap 14 10 - 20 mmol/L     Urea Nitrogen 43 (H) 6 - 23 mg/dL     Creatinine 1.45 (H) 0.50 - 1.30 mg/dL     eGFR 46 (L) >60 mL/min/1.73m*2     Calcium 9.6 8.6 - 10.3 mg/dL         Assessment & Plan  Urinary retention     DOYLE (acute kidney injury)     Hematuria     Anemia        Patrick is a 89-year-old male patient presented to emergency department for evaluation of urinary  retention.  Patient with a history of prostate cancer states he had a Zarco catheter placed 1 week ago and was removed at approximately noon today.  Patient states that since then he has been unable to urinate and developed lower abdominal pain.  Patient with a bladder scan of 440 in ED.  Had a Zarco catheter placed and was initially going to be discharged home on prophylactic Keflex with follow-up to his urologist.  Patient developed jeremiah hematuria which clogged his Zarco catheter requiring continuous bladder irrigation.  Patient also has a medication mixup in which she has been taking Plavix, Eliquis, and baby aspirin daily.  Per medical records patient was removed from his Plavix on 4/7/2025 by his cardiologist.  There was a telephone conversation in which this was cleared up again on 4/25/2025 and the patient states he would take that Plavix out of his pillbox but he is unsure whether he did.  Consult placed to urology.  Labs obtained, patient found to have an DOYLE and anemia.  Vital signs within normal limits.  Patient admitted for further medical management.     Urinary retention/DOYLE/hematuria/anemia  Inpatient telemetry admission to Dr. Hilda Zarco/continuous bladder irrigation  Consult urology and appreciate input  Hold home anticoagulation including Eliquis, baby aspirin  Patient requiring medication education  Trend hemoglobin  No IV fluids for DOYLE 2/2 history of CHF  Hold home furosemide/Aldactone 2/2 DOYLE  Sock pitting edema noted to bilateral lower extremities  Cardiac/low-sodium diet  Tylenol as needed     Prostate cancer/HTN/CHF/COPD/diverticulosis/CAD/DJD  #Chronic conditions  Continue home atorvastatin 40 mg daily  Continue home inhalers  Hold home Eliquis, baby aspirin, Lasix, Aldactone  Attending to restart medications as appropriate  Echocardiogram on file 3/6/2025: EF 50 to 55%, mild AVR     DVT Ppx  SCDs  No chemical prophylaxis 2/2 hematuria  Up to chair with assistance             Chief  Complaint   Patient presents with    Urinary Retention       Had paz removed this am, has not urinated since.         This is a 89 years old male patient presented to the emergency department with a chief complaint of urinary retention.  Stated that today around noon he removed his Paz catheter and he could not pass urine since then.  Denies any flank pain, abdominal pain, nausea, vomiting, fever, chills, body aches, chest pain.     Review of system: As stated above in the HPI section.     had concerns including Urinary Retention (Had paz removed this am, has not urinated since.).        Problem List Items Addressed This Visit         * (Principal) Urinary retention - Primary     Relevant Medications     cephalexin (Keflex) 500 mg capsule     Other Relevant Orders     Referral to Urology     Hematuria         HPI         Urinary Retention     Additional comments: Had paz removed this am, has not urinated since.            Last edited by Javid Moya RN on 4/30/2025  8:26 PM.             Problem List as of 5/1/2025 Reviewed: 12/30/2024 11:23 PM by BONNIE Cristobal-CNP        Acquired spondylolisthesis of lumbosacral region     COPD (chronic obstructive pulmonary disease) (Multi)     Arthritis of left hip     Coronary artery disease     Diverticulosis     DJD (degenerative joint disease)     Dyslipidemia     HTN (hypertension)     Mitral valve insufficiency     Prostate CA (Multi)     S/P mitral valve clip implantation     Dyspnea on exertion     Cataract, nuclear sclerotic, both eyes     Contusion of hip     Eye strain, bilateral     History of malignant neoplasm of skin     History of repair of hip joint     Hyperopia of both eyes with regular astigmatism     Regular astigmatism of both eyes with presbyopia     Left hip pain     Low back pain     CHF (congestive heart failure), NYHA class II, acute on chronic, diastolic     * (Principal) Urinary retention     DOYLE (acute kidney injury)     Hematuria      Anemia              Active Ambulatory Problems     Diagnosis Date Noted    Acquired spondylolisthesis of lumbosacral region 02/20/2024    COPD (chronic obstructive pulmonary disease) (Multi) 02/20/2024    Arthritis of left hip 02/20/2024    Coronary artery disease 02/20/2024    Diverticulosis 02/20/2024    DJD (degenerative joint disease) 02/20/2024    Dyslipidemia 02/20/2024    HTN (hypertension) 02/20/2024    Mitral valve insufficiency 02/20/2024    Prostate CA (Multi) 02/20/2024    S/P mitral valve clip implantation 02/20/2024    Dyspnea on exertion 02/20/2024    Cataract, nuclear sclerotic, both eyes 05/02/2023    Contusion of hip 03/01/2024    Eye strain, bilateral 03/01/2024    History of malignant neoplasm of skin 03/01/2024    History of repair of hip joint 03/01/2024    Hyperopia of both eyes with regular astigmatism 03/01/2024    Regular astigmatism of both eyes with presbyopia 03/01/2024    Left hip pain 03/01/2024    Low back pain 03/01/2024    CHF (congestive heart failure), NYHA class II, acute on chronic, diastolic 11/01/2024           Resolved Ambulatory Problems     Diagnosis Date Noted    Congestive heart failure 03/01/2024    Hyperlipoproteinemia 02/20/2024           Past Medical History:   Diagnosis Date    Cataract      CHF (congestive heart failure)      Hypertension      Personal history of malignant neoplasm of prostate      Personal history of other malignant neoplasm of skin                      Active Orders   Procedures     Irrigation of Bladder       Frequency: Once       Number of Occurrences: 1 Occurrences       Order Comments: Please proceed with continuous bladder irrigation.  Thank you      Diet     Adult diet Cardiac; 70 gm fat; 2 - 3 grams Sodium       Frequency: Effective now       Number of Occurrences: Until Specified   Nursing     Activity (specify) Activity With Exceptions; Up in chair       Frequency: Until discontinued       Number of Occurrences: Until Specified     Apply  sequential compression device       Frequency: Until discontinued       Number of Occurrences: Until Specified     Height on admission       Frequency: Once       Number of Occurrences: 1 Occurrences     Maintain Urethral Catheter with Nurse Driven Indwelling Urethral Catheter Removal Protocol       Frequency: Until discontinued       Number of Occurrences: Until Specified     Maintain Urethral Catheter with Nurse Driven Indwelling Urethral Catheter Removal Protocol       Frequency: Until discontinued       Number of Occurrences: Until Specified     No Isolation Required       Frequency: Once       Number of Occurrences: 1 Occurrences     Notify provider       Frequency: Until discontinued       Number of Occurrences: Until Specified     Nursing Guidelines: Paz Cath       Frequency: Until discontinued       Number of Occurrences: Until Specified       Order Comments: 1. Perform a Post Voiding Residual (PVR) using the Bladder Volume Control Indicator (BVI) the first two voidings after a paz catheter is removed.  If the PVR is greater than 450cc perform straight cath.  If the patient requires a second straight cath procedure notify physician on the next two rounds.  2. If the patient has not voided after 8 hours check volume with the BVI and straight cath if over 450cc.  3. If no voiding at discharge reinsert indwelling urinary catheter and recommend referral to Urology Clinic at discharge.   4. Notify physician of indwelling urinary catheter replacement        Pain Assessment       Frequency: Per unit standards       Number of Occurrences: Until Specified     Physician Communication       Frequency: Until discontinued       Number of Occurrences: Until Specified       Order Comments: IF patient requires a second straight cath procedure after paz removed, notify physician on the next rounds        Vital Signs       Frequency: q4h       Number of Occurrences: Until Specified     Weight on admission        Frequency: Once       Number of Occurrences: 1 Occurrences   Consult     Inpatient consult to Social Work and TCC       Frequency: Once       Number of Occurrences: 1 Occurrences     Inpatient consult to Urology       Frequency: Once       Number of Occurrences: 1 Occurrences   Nourishments     May Participate in Room Service       Frequency: Once       Number of Occurrences: 1 Occurrences   Respiratory Care     Respiratory care eval and treat       Frequency: Once       Number of Occurrences: 1 Occurrences   ECG     ECG 12 lead       Frequency: Once       Number of Occurrences: 1 Occurrences     Electrocardiogram, 12-lead PRN ACS symptoms       Frequency: PRN       Number of Occurrences: Until Specified       Order Comments: Notify provider if performed.      Telemetry     Telemetry monitoring - Floor only       Frequency: Until discontinued       Number of Occurrences: 3 Days   Medications     acetaminophen (Tylenol) tablet 650 mg       Frequency: q4h PRN       Dose: 650 mg       Route: oral     albuterol 90 mcg/actuation inhaler 2 puff       Frequency: q6h PRN       Dose: 2 puff       Route: inhalation     atorvastatin (Lipitor) tablet 40 mg       Frequency: Daily       Dose: 40 mg       Route: oral     fluticasone furoate-vilanteroL (Breo Ellipta) 200-25 mcg/dose inhaler 1 puff       Linked Order: And       Frequency: Daily       Dose: 1 puff       Route: inhalation     furosemide (Lasix) tablet 20 mg       Frequency: Every other day       Dose: 20 mg       Route: oral     spironolactone (Aldactone) tablet 12.5 mg       Frequency: Daily       Dose: 12.5 mg       Route: oral     tiotropium (Spiriva Respimat) 2.5 mcg/actuation inhaler 2 puff       Linked Order: And       Frequency: Daily       Dose: 2 puff       Route: inhalation      Dietary Orders (From admission, onward)          Start     Ordered     05/01/25 0213   May Participate in Room Service  ( ROOM SERVICE MAY PARTICIPATE)  Once        Question:  .   Answer:  Yes    05/01/25 0212     05/01/25 0012   Adult diet Cardiac; 70 gm fat; 2 - 3 grams Sodium  Diet effective now        Question Answer Comment   Diet type Cardiac     Fat restriction: 70 gm fat     Sodium restriction: 2 - 3 grams Sodium         05/01/25 0011                       89 yrs@  ADMITDTTM@  Urinary retention [R33.9]  Hematuria, unspecified type [R31.9]  [unfilled]  Weight: 70.3 kg (155 lb)     Vitals          Vitals:     04/30/25 2024 04/30/25 2100 04/30/25 2200 05/01/25 0209   BP: 119/56 116/64   107/67   Pulse: 83 56 66 58   Resp: (!) 22         Temp: 36.6 °C (97.9 °F)     36.5 °C (97.7 °F)   SpO2: 99% 100% 98% 98%   Weight: 70.3 kg (155 lb)           05/01/25 0351 05/01/25 0800 05/01/25 1129   BP: 109/67 112/61 98/51   Pulse: 57 60 68   Resp:         Temp: 36.3 °C (97.3 °F) 36.4 °C (97.5 °F) 36.6 °C (97.9 °F)   SpO2: 97% (!) 89% 93%   Weight:                        Chief Complaint    Urinary Retention            Patient seen resting in bed with head of bed elevated, no s/s or c/o acute difficulties at this time.  Vital signs for last 24 hours Temp:  [36.3 °C (97.3 °F)-36.6 °C (97.9 °F)] 36.6 °C (97.9 °F)  Heart Rate:  [56-83] 68  Resp:  [22] 22  BP: ()/(51-67) 98/51    I/O this shift:  In: -   Out: 600 [Urine:600]  Patient still requiring frequent cardiac and SPO2 monitoring. Continue aggressive pulmonary hygiene and oral hygiene. Off loading as tolerated for skin integrity. Medications and labs reviewed-    Patient recently received an antibiotic (last 12 hours)         None                    Results for orders placed or performed during the hospital encounter of 04/30/25 (from the past 96 hours)   Urinalysis with Reflex Culture and Microscopic   Result Value Ref Range     Color, Urine Dark-Brown (N) Light-Yellow, Yellow, Dark-Yellow     Appearance, Urine Ex.Turbid (N) Clear     Specific Gravity, Urine 1.019 1.005 - 1.035     pH, Urine 7.0 5.0, 5.5, 6.0, 6.5, 7.0, 7.5, 8.0     Protein,  Urine 300 (3+) (A) NEGATIVE, 10 (TRACE), 20 (TRACE) mg/dL     Glucose, Urine Normal Normal mg/dL     Blood, Urine OVER (3+) (A) NEGATIVE mg/dL     Ketones, Urine NEGATIVE NEGATIVE mg/dL     Bilirubin, Urine NEGATIVE NEGATIVE mg/dL     Urobilinogen, Urine Normal Normal mg/dL     Nitrite, Urine NEGATIVE NEGATIVE     Leukocyte Esterase, Urine 75 Connie/uL (A) NEGATIVE   Extra Urine Gray Tube   Result Value Ref Range     Extra Tube Hold for add-ons.     Microscopic Only, Urine   Result Value Ref Range     WBC, Urine 1-5 1-5, NONE /HPF     RBC, Urine >20 (A) NONE, 1-2, 3-5 /HPF   Coagulation Screen   Result Value Ref Range     Protime 17.2 (H) 9.8 - 12.4 seconds     INR 1.6 (H) 0.9 - 1.1     aPTT 29 26 - 36 seconds   CBC   Result Value Ref Range     WBC 6.3 4.4 - 11.3 x10*3/uL     nRBC 0.0 0.0 - 0.0 /100 WBCs     RBC 3.28 (L) 4.50 - 5.90 x10*6/uL     Hemoglobin 11.0 (L) 13.5 - 17.5 g/dL     Hematocrit 33.4 (L) 41.0 - 52.0 %      (H) 80 - 100 fL     MCH 33.5 26.0 - 34.0 pg     MCHC 32.9 32.0 - 36.0 g/dL     RDW 13.7 11.5 - 14.5 %     Platelets 138 (L) 150 - 450 x10*3/uL   Basic metabolic panel   Result Value Ref Range     Glucose 106 (H) 74 - 99 mg/dL     Sodium 137 136 - 145 mmol/L     Potassium 5.2 3.5 - 5.3 mmol/L     Chloride 104 98 - 107 mmol/L     Bicarbonate 24 21 - 32 mmol/L     Anion Gap 14 10 - 20 mmol/L     Urea Nitrogen 43 (H) 6 - 23 mg/dL     Creatinine 1.45 (H) 0.50 - 1.30 mg/dL     eGFR 46 (L) >60 mL/min/1.73m*2     Calcium 9.6 8.6 - 10.3 mg/dL      Patient fully evaluated 05/01, thorough record review performed of previous labs and notes from prior encounters. Plan discussed with interdisciplinary team, consults placed, appreciate input. Will continue current and repeat labs in the AM.       Discharge planning discussed with patient and care team. Therapy evaluations ordered. Patient aware and agreeable to current plan, continue plan as above.      I spent a total of 75 minutes on the date of the  service which included preparing to see the patient, face-to-face patient care, completing clinical documentation, obtaining and/or reviewing separately obtained history, performing a medically appropriate examination, counseling and educating the patient/family/caregiver, ordering medications, tests, or procedures, communicating with other HCPs (not separately reported), independently interpreting results (not separately reported), communicating results to the patient/family/caregiver, and care coordination (not separately reported).            Sangeetha Dumont                   Revision History        Patient fully evaluated on May 3.  H&H are stable.  Still with significant mobility issues.  Recheck labs in AM.  To have recheck labs in AM.  Still having hematuria.  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  Continue present IV antibiotics for contaminated urine.  Gordo Stevens MD       English

## 2025-05-03 NOTE — PROGRESS NOTES
Nurse contacted this provider for evaluation of patient due to intermittent heart rate decreases on monitor. EKG's were obtained and evaluated. Patient is asymptomatic and remains comfortable but due to the EKG changes it was determined that patient would benefit from being transferred to step-down for closer monitoring.

## 2025-05-03 NOTE — CONSULTS
Inpatient consult to Cardiology  Consult performed by: James C Ramicone, DO  Consult ordered by: Gordo Stevens MD  Reason for consult: Bradycardia          Reason for consultation:    Bradycardia    History Of Present Illness:      This is a 89-year-old male with a history of coronary artery disease.  He is being seen today in consultation for evaluation of bradycardia.  The patient presented to [facility] because of urinary retention and hematuria.  He has a Zarco catheter at this time and is having significant hematuria.  From a cardiac perspective, he was noted to be bradycardic at times with concerns of high-grade AV block.  In my discussion with the patient this morning, he states that he has been very active and denies any lightheadedness, dizziness, falls, or syncopal episodes.  The patient's son was also present and has not observed any symptoms related to bradycardia.  Cardiology records reviewed.    Past medical history:    Prostate cancer  Hypertension  CAD with history of NSTEMI in 2022  Mitral regurgitation with history of MitraClip    Past surgical history:    Hip replacement  Prostatectomy  Stent placement    Social history: Former smoker    Family history: Negative for premature CAD    Review of Systems    Other review of systems negative    Social History:  He reports that he has quit smoking. His smoking use included cigarettes. He has never used smokeless tobacco. He reports current alcohol use. He reports that he does not use drugs.    Family History:  Family History[1]     Allergies:  Bee venom protein (honey bee)    Medications:  Current Medications[2]      Last Recorded Vitals:  Vitals:    05/03/25 0108 05/03/25 0321 05/03/25 0725 05/03/25 0828   BP: 128/67 112/61  116/59   BP Location: Right arm   Right arm   Patient Position: Lying Lying  Lying   Pulse: 58 59  70   Resp: 16 18  18   Temp: 36 °C (96.8 °F) 36.2 °C (97.2 °F)  36.1 °C (97 °F)   TempSrc: Temporal Temporal   "Temporal   SpO2: 97% 96% 95% 94%   Weight: 68 kg (150 lb)      Height: 1.727 m (5' 8\")          Physical Exam:    General Appearance:  Alert, oriented, no distress  Skin:  Warm and dry  Head and Neck:  No elevation of JVP, no carotid bruits  Cardiac Exam:  Rhythm is regular, S1 and S2 are normal, no murmur S3 or S4  Lungs:  Clear to auscultation  Extremities:  no edema  Neurologic:  No focal deficits  Psychiatric:  Appropriate mood and behavior    Laboratory data:    CMP:  Recent Labs     05/03/25  0545 05/02/25  0647 05/01/25  0121 12/30/24  0807 11/03/24  0514 11/02/24  0540 11/01/24  1539 11/01/24  1423 10/29/24  1043 11/07/23  0945 08/29/23  1037 02/22/23  1410 02/22/23  1409 09/09/22  0922 07/22/22  1042 07/15/22  0322 07/14/22  1910    138 137 138 140 142  --  136 142   < > 138   < > 139 142   < > 138 141   K 4.9 4.5 5.2 4.6 4.0 4.7 4.7 6.6* 5.0   < > 5.3   < > 4.9 4.4   < > 4.0 4.1    106 104 106 104 107  --  106 109*   < > 106   < > 104 107   < > 107 108*   CO2 26 24 24 22 26 26  --  19* 25   < > 26   < > 26 25   < > 24 24   ANIONGAP 11 13 14 15 14 14  --  18 13   < > 11   < > 14 14   < > 11 13   BUN 29* 30* 43* 49* 31* 30*  --  27* 33*   < > 48*   < > 38* 28*   < > 24* 25*   CREATININE 1.11 1.16 1.45* 1.27 1.18 1.14  --  1.04 1.21   < > 1.33*   < > 1.17 1.09   < > 0.91 0.86   EGFR 63 60* 46* 54* 59* 61  --  69 57*   < >  --   --   --   --   --   --   --    MG 1.94  --   --  1.96  --   --   --  2.04  --   --  2.42*  --  2.36 2.08  --  2.00 2.11    < > = values in this interval not displayed.     Recent Labs     05/03/25  0545 05/02/25  0647 12/30/24  0807 11/03/24  0514 11/01/24  1423   ALBUMIN 3.6 3.6 4.3 3.6 4.5   ALKPHOS 90 88 105 82 99   ALT 17 16 24 21 27   AST 25 24 46* 29 56*   BILITOT 1.0 1.2 1.6* 2.1* 2.2*     CBC:  Recent Labs     05/03/25  0545 05/02/25  0647 05/01/25  0121 12/30/24  0807 11/03/24  0513 11/02/24  0540 11/01/24  1423 08/02/24  0826   WBC 5.2 5.0 6.3 5.9 3.9* 4.5 6.3 5.0 "   HGB 10.3* 10.2* 11.0* 12.8* 12.1* 12.2* 13.1* 13.7   HCT 31.1* 31.2* 33.4* 39.3* 35.6* 36.1* 40.5* 42.0   * 136* 138* 147* 105* 104* 107* 176   * 102* 102* 97 95 97 99 99     COAG:   Recent Labs     05/01/25  0121 11/01/24  1704 02/22/23  1651 07/22/22  1042 07/15/22  0322 07/14/22  1748 06/28/22  1407 05/08/22  0756 11/06/20  0856   INR 1.6* 1.3* 1.0 1.0 1.3* 1.2* 1.0  --  1.0   DDIMERVTE  --   --   --   --   --   --   --  834*  --      HEME/ENDO:  Recent Labs     12/27/24  1507 10/29/24  1043 08/02/24  0826 03/05/24  0913 11/07/23  0945 08/29/23  1037 02/23/23  0501   TSH  --   --   --   --   --  3.63 4.87*   HGBA1C 6.4* 6.4* 6.2* 6.3*   < >  --  6.7*    < > = values in this interval not displayed.      CARDIAC:   Recent Labs     12/30/24  0933 12/30/24  0807 11/01/24  1539 11/01/24  1423 02/23/23  0501 02/22/23  1409 02/22/23  1408 05/09/22  0524 05/08/22  1545 05/08/22  0908 05/08/22  0756   TROPHS 61* 71* 65* 59* 57*  --  44* 63* 56*   < > 48*   BNP  --  742*  --  628*  --  193*  --   --   --   --  294*    < > = values in this interval not displayed.     Recent Labs     08/29/23  1037 02/23/23  0501 05/09/22  0524   CHOL 108 97 116   LDLF 52 42 60   HDL 45.5 45.4 44.1   TRIG 51 47 58       Test Review:  I have personally review the diagnostic testing:  EKG review:  -May 8, 2022: Normal sinus rhythm, first-degree AV block, left bundle branch block  -November 1, 2024: Atrial fibrillation, left bundle branch block  -May 2, 2025: Atrial tachycardia with variable AV block, left bundle branch block  Telemetry May 3, 2025: Atrial fibrillation, controlled ventricular response    Echocardiogram March 6, 2025: Ejection fraction 50 to 55%    Assessment/Plan:    1.  Paroxysmal atrial fibrillation: This patient is in atrial fibrillation this morning with a controlled ventricular response.  His heart rate has been between 55 and 70 bpm while lying in bed.  The patient adamantly denies any symptoms related to  bradycardia prior to this hospitalization.  At this time there is no pacemaker indication.  I will continue to follow this patient's progress and monitor his rhythm status on telemetry.    2.  Coronary artery disease: History of a NSTEMI in May 2022.  At that time he underwent cardiac catheterization with stenting of the LAD, then MitraClip for treatment of severe MR.  No signs of CHF at this time, and ejection fraction was 50 to 55% based on echocardiogram from March 2025.    3.  Hematuria: The patient has a Zarco in place which is being irrigated.    James C Ramicone, DO         [1]   Family History  Problem Relation Name Age of Onset    Breast cancer Daughter      Breast cancer Maternal Grandmother     [2]   Current Facility-Administered Medications   Medication Dose Route Frequency Provider Last Rate Last Admin    acetaminophen (Tylenol) tablet 650 mg  650 mg oral q4h PRN PADMINI Bourne   650 mg at 05/01/25 0900    albuterol 90 mcg/actuation inhaler 2 puff  2 puff inhalation q6h PRN PADMINI Bourne        atorvastatin (Lipitor) tablet 40 mg  40 mg oral Daily JACKELINE BourneCNP   40 mg at 05/03/25 1018    cefTRIAXone (Rocephin) 1 g in dextrose (iso) IV 50 mL  1 g intravenous q24h Gordo Stevens MD   Stopped at 05/02/25 1849    finasteride (Proscar) tablet 5 mg  5 mg oral Daily Perry Ramirez PA-C   5 mg at 05/03/25 1018    tiotropium (Spiriva Respimat) 2.5 mcg/actuation inhaler 2 puff  2 puff inhalation Daily PADMINI Bourne   2 puff at 05/03/25 0725    And    fluticasone furoate-vilanteroL (Breo Ellipta) 200-25 mcg/dose inhaler 1 puff  1 puff inhalation Daily PADMINI Bourne   1 puff at 05/03/25 0727    [Held by provider] furosemide (Lasix) tablet 20 mg  20 mg oral Every other day PADMINI Bourne        morphine injection 2 mg  2 mg intravenous Once PADMINI [place]        [Held by provider] spironolactone (Aldactone) tablet  12.5 mg  12.5 mg oral Daily PADMINI Bourne        traMADol (Ultram) tablet 50 mg  50 mg oral q8h PRN PADMINI [place]        Or    traMADol (Ultram) tablet 25 mg  25 mg oral q6h PRN PADMINI [place]

## 2025-05-03 NOTE — CARE PLAN
The patient's goals for the shift include      The clinical goals for the shift include pain management, monitor HR & for pt to remain HDS/safe throughout the shift      Problem: Pain - Adult  Goal: Verbalizes/displays adequate comfort level or baseline comfort level  Outcome: Progressing

## 2025-05-03 NOTE — PROGRESS NOTES
"Patrick Vázquez is a 89 y.o. male on day 2 of admission presenting with Urinary retention.    Subjective   Required hand irrigation overnight  Cbi running moderately  Clear red in bag  Hx prostatectomy with remote salvage radiation  H/h stable today  Creatinine Improved         Objective     Physical Exam    Last Recorded Vitals  Blood pressure 116/59, pulse 70, temperature 36.1 °C (97 °F), resp. rate 18, height 1.727 m (5' 8\"), weight 68 kg (150 lb), SpO2 94%.  Intake/Output last 3 Shifts:  I/O last 3 completed shifts:  In: 440 (6.3 mL/kg) [P.O.:440]  Out: 89384 (165 mL/kg) [Urine:36964 (4.6 mL/kg/hr)]  Dosing Weight: 70.3 kg     Relevant Results  Results for orders placed or performed during the hospital encounter of 04/30/25 (from the past 96 hours)   Urinalysis with Reflex Culture and Microscopic   Result Value Ref Range    Color, Urine Dark-Brown (N) Light-Yellow, Yellow, Dark-Yellow    Appearance, Urine Ex.Turbid (N) Clear    Specific Gravity, Urine 1.019 1.005 - 1.035    pH, Urine 7.0 5.0, 5.5, 6.0, 6.5, 7.0, 7.5, 8.0    Protein, Urine 300 (3+) (A) NEGATIVE, 10 (TRACE), 20 (TRACE) mg/dL    Glucose, Urine Normal Normal mg/dL    Blood, Urine OVER (3+) (A) NEGATIVE mg/dL    Ketones, Urine NEGATIVE NEGATIVE mg/dL    Bilirubin, Urine NEGATIVE NEGATIVE mg/dL    Urobilinogen, Urine Normal Normal mg/dL    Nitrite, Urine NEGATIVE NEGATIVE    Leukocyte Esterase, Urine 75 Connie/uL (A) NEGATIVE   Extra Urine Gray Tube   Result Value Ref Range    Extra Tube Hold for add-ons.    Microscopic Only, Urine   Result Value Ref Range    WBC, Urine 1-5 1-5, NONE /HPF    RBC, Urine >20 (A) NONE, 1-2, 3-5 /HPF   Urine Culture    Specimen: Clean Catch/Voided; Urine   Result Value Ref Range    Urine Culture       Growth indicates contamination with periurethral justina. Repeat culture if clinically indicated.   Coagulation Screen   Result Value Ref Range    Protime 17.2 (H) 9.8 - 12.4 seconds    INR 1.6 (H) 0.9 - 1.1    aPTT 29 26 - 36 " seconds   CBC   Result Value Ref Range    WBC 6.3 4.4 - 11.3 x10*3/uL    nRBC 0.0 0.0 - 0.0 /100 WBCs    RBC 3.28 (L) 4.50 - 5.90 x10*6/uL    Hemoglobin 11.0 (L) 13.5 - 17.5 g/dL    Hematocrit 33.4 (L) 41.0 - 52.0 %     (H) 80 - 100 fL    MCH 33.5 26.0 - 34.0 pg    MCHC 32.9 32.0 - 36.0 g/dL    RDW 13.7 11.5 - 14.5 %    Platelets 138 (L) 150 - 450 x10*3/uL   Basic metabolic panel   Result Value Ref Range    Glucose 106 (H) 74 - 99 mg/dL    Sodium 137 136 - 145 mmol/L    Potassium 5.2 3.5 - 5.3 mmol/L    Chloride 104 98 - 107 mmol/L    Bicarbonate 24 21 - 32 mmol/L    Anion Gap 14 10 - 20 mmol/L    Urea Nitrogen 43 (H) 6 - 23 mg/dL    Creatinine 1.45 (H) 0.50 - 1.30 mg/dL    eGFR 46 (L) >60 mL/min/1.73m*2    Calcium 9.6 8.6 - 10.3 mg/dL   Comprehensive Metabolic Panel   Result Value Ref Range    Glucose 98 74 - 99 mg/dL    Sodium 138 136 - 145 mmol/L    Potassium 4.5 3.5 - 5.3 mmol/L    Chloride 106 98 - 107 mmol/L    Bicarbonate 24 21 - 32 mmol/L    Anion Gap 13 10 - 20 mmol/L    Urea Nitrogen 30 (H) 6 - 23 mg/dL    Creatinine 1.16 0.50 - 1.30 mg/dL    eGFR 60 (L) >60 mL/min/1.73m*2    Calcium 9.0 8.6 - 10.3 mg/dL    Albumin 3.6 3.4 - 5.0 g/dL    Alkaline Phosphatase 88 33 - 136 U/L    Total Protein 5.6 (L) 6.4 - 8.2 g/dL    AST 24 9 - 39 U/L    Bilirubin, Total 1.2 0.0 - 1.2 mg/dL    ALT 16 10 - 52 U/L   CBC and Auto Differential   Result Value Ref Range    WBC 5.0 4.4 - 11.3 x10*3/uL    nRBC 0.0 0.0 - 0.0 /100 WBCs    RBC 3.07 (L) 4.50 - 5.90 x10*6/uL    Hemoglobin 10.2 (L) 13.5 - 17.5 g/dL    Hematocrit 31.2 (L) 41.0 - 52.0 %     (H) 80 - 100 fL    MCH 33.2 26.0 - 34.0 pg    MCHC 32.7 32.0 - 36.0 g/dL    RDW 13.7 11.5 - 14.5 %    Platelets 136 (L) 150 - 450 x10*3/uL    Neutrophils % 66.0 40.0 - 80.0 %    Immature Granulocytes %, Automated 0.2 0.0 - 0.9 %    Lymphocytes % 20.5 13.0 - 44.0 %    Monocytes % 10.7 2.0 - 10.0 %    Eosinophils % 2.0 0.0 - 6.0 %    Basophils % 0.6 0.0 - 2.0 %    Neutrophils  Absolute 3.28 1.60 - 5.50 x10*3/uL    Immature Granulocytes Absolute, Automated 0.01 0.00 - 0.50 x10*3/uL    Lymphocytes Absolute 1.02 0.80 - 3.00 x10*3/uL    Monocytes Absolute 0.53 0.05 - 0.80 x10*3/uL    Eosinophils Absolute 0.10 0.00 - 0.40 x10*3/uL    Basophils Absolute 0.03 0.00 - 0.10 x10*3/uL   Comprehensive Metabolic Panel   Result Value Ref Range    Glucose 106 (H) 74 - 99 mg/dL    Sodium 137 136 - 145 mmol/L    Potassium 4.9 3.5 - 5.3 mmol/L    Chloride 105 98 - 107 mmol/L    Bicarbonate 26 21 - 32 mmol/L    Anion Gap 11 10 - 20 mmol/L    Urea Nitrogen 29 (H) 6 - 23 mg/dL    Creatinine 1.11 0.50 - 1.30 mg/dL    eGFR 63 >60 mL/min/1.73m*2    Calcium 9.0 8.6 - 10.3 mg/dL    Albumin 3.6 3.4 - 5.0 g/dL    Alkaline Phosphatase 90 33 - 136 U/L    Total Protein 5.8 (L) 6.4 - 8.2 g/dL    AST 25 9 - 39 U/L    Bilirubin, Total 1.0 0.0 - 1.2 mg/dL    ALT 17 10 - 52 U/L   CBC and Auto Differential   Result Value Ref Range    WBC 5.2 4.4 - 11.3 x10*3/uL    nRBC 0.0 0.0 - 0.0 /100 WBCs    RBC 3.08 (L) 4.50 - 5.90 x10*6/uL    Hemoglobin 10.3 (L) 13.5 - 17.5 g/dL    Hematocrit 31.1 (L) 41.0 - 52.0 %     (H) 80 - 100 fL    MCH 33.4 26.0 - 34.0 pg    MCHC 33.1 32.0 - 36.0 g/dL    RDW 13.5 11.5 - 14.5 %    Platelets 134 (L) 150 - 450 x10*3/uL    Neutrophils % 62.2 40.0 - 80.0 %    Immature Granulocytes %, Automated 0.2 0.0 - 0.9 %    Lymphocytes % 22.2 13.0 - 44.0 %    Monocytes % 11.7 2.0 - 10.0 %    Eosinophils % 3.1 0.0 - 6.0 %    Basophils % 0.6 0.0 - 2.0 %    Neutrophils Absolute 3.26 1.60 - 5.50 x10*3/uL    Immature Granulocytes Absolute, Automated 0.01 0.00 - 0.50 x10*3/uL    Lymphocytes Absolute 1.16 0.80 - 3.00 x10*3/uL    Monocytes Absolute 0.61 0.05 - 0.80 x10*3/uL    Eosinophils Absolute 0.16 0.00 - 0.40 x10*3/uL    Basophils Absolute 0.03 0.00 - 0.10 x10*3/uL   Magnesium   Result Value Ref Range    Magnesium 1.94 1.60 - 2.40 mg/dL                This patient currently has cardiac telemetry ordered; if  you would like to modify or discontinue the telemetry order, click here to go to the orders activity to modify/discontinue the order.                 Assessment & Plan  Urinary retention    DOYLE (acute kidney injury)    Hematuria    Anemia    Hematuria, likely related to retention , paz and radiation cytitis  Required hand irrigation overnight  Discussed amicar with attending  Will hold off given cardiac history  Begin finasteride  Continue cbi, titrate to light pink  Hand irrigate prn        I spent 20 minutes in the professional and overall care of this patient.      Perry Ramirez PA-C

## 2025-05-04 VITALS
OXYGEN SATURATION: 96 % | HEIGHT: 68 IN | BODY MASS INDEX: 22.73 KG/M2 | TEMPERATURE: 99.3 F | RESPIRATION RATE: 18 BRPM | WEIGHT: 150 LBS | DIASTOLIC BLOOD PRESSURE: 71 MMHG | SYSTOLIC BLOOD PRESSURE: 115 MMHG | HEART RATE: 66 BPM

## 2025-05-04 LAB
ALBUMIN SERPL BCP-MCNC: 3.4 G/DL (ref 3.4–5)
ALP SERPL-CCNC: 89 U/L (ref 33–136)
ALT SERPL W P-5'-P-CCNC: 17 U/L (ref 10–52)
ANION GAP SERPL CALC-SCNC: 12 MMOL/L (ref 10–20)
AST SERPL W P-5'-P-CCNC: 24 U/L (ref 9–39)
BASOPHILS # BLD AUTO: 0.05 X10*3/UL (ref 0–0.1)
BASOPHILS NFR BLD AUTO: 0.9 %
BILIRUB SERPL-MCNC: 0.9 MG/DL (ref 0–1.2)
BUN SERPL-MCNC: 25 MG/DL (ref 6–23)
CALCIUM SERPL-MCNC: 8.6 MG/DL (ref 8.6–10.3)
CHLORIDE SERPL-SCNC: 106 MMOL/L (ref 98–107)
CO2 SERPL-SCNC: 24 MMOL/L (ref 21–32)
CREAT SERPL-MCNC: 1.09 MG/DL (ref 0.5–1.3)
EGFRCR SERPLBLD CKD-EPI 2021: 64 ML/MIN/1.73M*2
EOSINOPHIL # BLD AUTO: 0.26 X10*3/UL (ref 0–0.4)
EOSINOPHIL NFR BLD AUTO: 4.5 %
ERYTHROCYTE [DISTWIDTH] IN BLOOD BY AUTOMATED COUNT: 13.1 % (ref 11.5–14.5)
GLUCOSE SERPL-MCNC: 102 MG/DL (ref 74–99)
HCT VFR BLD AUTO: 29.8 % (ref 41–52)
HGB BLD-MCNC: 10 G/DL (ref 13.5–17.5)
IMM GRANULOCYTES # BLD AUTO: 0.01 X10*3/UL (ref 0–0.5)
IMM GRANULOCYTES NFR BLD AUTO: 0.2 % (ref 0–0.9)
LYMPHOCYTES # BLD AUTO: 1.51 X10*3/UL (ref 0.8–3)
LYMPHOCYTES NFR BLD AUTO: 26.1 %
MCH RBC QN AUTO: 34 PG (ref 26–34)
MCHC RBC AUTO-ENTMCNC: 33.6 G/DL (ref 32–36)
MCV RBC AUTO: 101 FL (ref 80–100)
MONOCYTES # BLD AUTO: 0.66 X10*3/UL (ref 0.05–0.8)
MONOCYTES NFR BLD AUTO: 11.4 %
NEUTROPHILS # BLD AUTO: 3.29 X10*3/UL (ref 1.6–5.5)
NEUTROPHILS NFR BLD AUTO: 56.9 %
NRBC BLD-RTO: 0 /100 WBCS (ref 0–0)
PLATELET # BLD AUTO: 172 X10*3/UL (ref 150–450)
POTASSIUM SERPL-SCNC: 4.7 MMOL/L (ref 3.5–5.3)
PROT SERPL-MCNC: 5.5 G/DL (ref 6.4–8.2)
RBC # BLD AUTO: 2.94 X10*6/UL (ref 4.5–5.9)
SODIUM SERPL-SCNC: 137 MMOL/L (ref 136–145)
WBC # BLD AUTO: 5.8 X10*3/UL (ref 4.4–11.3)

## 2025-05-04 PROCEDURE — 99232 SBSQ HOSP IP/OBS MODERATE 35: CPT | Performed by: INTERNAL MEDICINE

## 2025-05-04 PROCEDURE — 2500000004 HC RX 250 GENERAL PHARMACY W/ HCPCS (ALT 636 FOR OP/ED): Mod: JZ | Performed by: INTERNAL MEDICINE

## 2025-05-04 PROCEDURE — 36415 COLL VENOUS BLD VENIPUNCTURE: CPT | Performed by: INTERNAL MEDICINE

## 2025-05-04 PROCEDURE — 2500000001 HC RX 250 WO HCPCS SELF ADMINISTERED DRUGS (ALT 637 FOR MEDICARE OP): Performed by: PHYSICIAN ASSISTANT

## 2025-05-04 PROCEDURE — 80053 COMPREHEN METABOLIC PANEL: CPT | Performed by: INTERNAL MEDICINE

## 2025-05-04 PROCEDURE — 85025 COMPLETE CBC W/AUTO DIFF WBC: CPT | Performed by: INTERNAL MEDICINE

## 2025-05-04 PROCEDURE — 2500000001 HC RX 250 WO HCPCS SELF ADMINISTERED DRUGS (ALT 637 FOR MEDICARE OP): Performed by: NURSE PRACTITIONER

## 2025-05-04 PROCEDURE — 2060000001 HC INTERMEDIATE ICU ROOM DAILY

## 2025-05-04 PROCEDURE — 94640 AIRWAY INHALATION TREATMENT: CPT

## 2025-05-04 RX ORDER — ALBUTEROL SULFATE 90 UG/1
2 INHALANT RESPIRATORY (INHALATION) EVERY 2 HOUR PRN
Status: ACTIVE | OUTPATIENT
Start: 2025-05-04

## 2025-05-04 RX ADMIN — FINASTERIDE 5 MG: 5 TABLET, FILM COATED ORAL at 09:26

## 2025-05-04 RX ADMIN — TIOTROPIUM BROMIDE INHALATION SPRAY 2 PUFF: 3.12 SPRAY, METERED RESPIRATORY (INHALATION) at 08:06

## 2025-05-04 RX ADMIN — CEFTRIAXONE SODIUM 1 G: 1 INJECTION, SOLUTION INTRAVENOUS at 17:00

## 2025-05-04 RX ADMIN — ACETAMINOPHEN 650 MG: 325 TABLET, FILM COATED ORAL at 21:07

## 2025-05-04 RX ADMIN — FLUTICASONE FUROATE AND VILANTEROL TRIFENATATE 1 PUFF: 200; 25 POWDER RESPIRATORY (INHALATION) at 08:04

## 2025-05-04 RX ADMIN — ATORVASTATIN CALCIUM 40 MG: 40 TABLET, FILM COATED ORAL at 09:26

## 2025-05-04 ASSESSMENT — COGNITIVE AND FUNCTIONAL STATUS - GENERAL
DRESSING REGULAR LOWER BODY CLOTHING: A LITTLE
CLIMB 3 TO 5 STEPS WITH RAILING: A LITTLE
HELP NEEDED FOR BATHING: A LITTLE
PERSONAL GROOMING: A LITTLE
MOVING TO AND FROM BED TO CHAIR: A LITTLE
DRESSING REGULAR UPPER BODY CLOTHING: A LITTLE
CLIMB 3 TO 5 STEPS WITH RAILING: A LITTLE
DRESSING REGULAR UPPER BODY CLOTHING: A LITTLE
MOVING TO AND FROM BED TO CHAIR: A LITTLE
DRESSING REGULAR LOWER BODY CLOTHING: A LITTLE
MOBILITY SCORE: 20
TURNING FROM BACK TO SIDE WHILE IN FLAT BAD: A LITTLE
STANDING UP FROM CHAIR USING ARMS: A LITTLE
DAILY ACTIVITIY SCORE: 19
DAILY ACTIVITIY SCORE: 19
HELP NEEDED FOR BATHING: A LITTLE
WALKING IN HOSPITAL ROOM: A LITTLE
TOILETING: A LITTLE
STANDING UP FROM CHAIR USING ARMS: A LITTLE
MOBILITY SCORE: 19
WALKING IN HOSPITAL ROOM: A LITTLE
PERSONAL GROOMING: A LITTLE
TOILETING: A LITTLE

## 2025-05-04 ASSESSMENT — PAIN DESCRIPTION - DESCRIPTORS: DESCRIPTORS: ACHING

## 2025-05-04 ASSESSMENT — PAIN - FUNCTIONAL ASSESSMENT
PAIN_FUNCTIONAL_ASSESSMENT: 0-10

## 2025-05-04 ASSESSMENT — PAIN SCALES - GENERAL
PAINLEVEL_OUTOF10: 2
PAINLEVEL_OUTOF10: 0 - NO PAIN
PAINLEVEL_OUTOF10: 4
PAINLEVEL_OUTOF10: 4

## 2025-05-04 NOTE — CARE PLAN
The patient's goals for the shift include      The clinical goals for the shift include continue irrigation to keep drainage pink    Over the shift, the patient will reposition to maintain skin integrity

## 2025-05-04 NOTE — CARE PLAN
The patient's goals for the shift include      The clinical goals for the shift include pain management, monitor HR & for pt to remain HDS/safe throughout the shift    Over the shift, the patient did not make progress toward the following goals. Barriers to progression include . Recommendations to address these barriers include .

## 2025-05-04 NOTE — PROGRESS NOTES
History Of Present Illness:      The patient has no complaints of palpitations, chest pain, or shortness of breath.  He states that he exercises regularly at home and uses a stationary bike with no functional limitations.    Past medical history:     Prostate cancer  Hypertension  CAD with history of NSTEMI in 2022  Mitral regurgitation with history of MitraClip     Past surgical history:     Hip replacement  Prostatectomy  Stent placement     Social history: Former smoker     Family history: Negative for premature CAD    Review of Systems  Other review of systems negative  Last Recorded Vitals:      5/3/2025     8:28 AM 5/3/2025    11:44 AM 5/3/2025     4:29 PM 5/3/2025     8:33 PM 5/3/2025    11:00 PM 5/4/2025     4:41 AM 5/4/2025     8:00 AM   Vitals   Systolic 116 130 122 104 108 103 97   Diastolic 59 70 58 72 56 58 54   BP Location Right arm Right arm  Right arm Right arm Right arm Right arm   Heart Rate 70 66 60 60 62 61 54   Temp 36.1 °C (97 °F) 36.7 °C (98.1 °F) 36.2 °C (97.2 °F) 36.3 °C (97.3 °F) 36.4 °C (97.5 °F) 36.5 °C (97.7 °F) 36.1 °C (97 °F)   Resp 18 18 18 18 18 16 18     Allergies:  Bee venom protein (honey bee)  Outpatient Medications:  Current Outpatient Medications   Medication Instructions    acetaminophen (TYLENOL) 650 mg, oral, Every 6 hours PRN    albuterol 90 mcg/actuation inhaler 2 puffs, Every 6 hours PRN    amLODIPine (NORVASC) 2.5 mg, Daily    apixaban (ELIQUIS) 5 mg, oral, Every 12 hours    ascorbic acid (VITAMIN C) 500 mg, Daily    atorvastatin (LIPITOR) 40 mg, oral, Daily    Breztri Aerosphere 160-9-4.8 mcg/actuation HFA aerosol inhaler 2 puffs, 2 times daily    cephalexin (KEFLEX) 500 mg, oral, 4 times daily    clopidogrel (Plavix) 75 mg tablet 1 tablet, Daily (0630)    cyclobenzaprine (FLEXERIL) 5 mg, oral, 3 times daily    furosemide (LASIX) 20 mg, oral, Every other day    ibuprofen 600 mg, oral, Every 8 hours PRN    metoprolol succinate XL (TOPROL-XL) 12.5 mg, oral, Daily     multivitamin tablet 1 tablet, Daily    olmesartan (BENICAR) 20 mg, Daily    spironolactone (ALDACTONE) 12.5 mg, oral, Daily       Physical Exam:    General Appearance:  Alert, oriented, no distress  Skin:  Warm and dry  Head and Neck:  No elevation of JVP, no carotid bruits  Cardiac Exam:  Rhythm is regular, S1 and S2 are normal, no murmur S3 or S4  Lungs:  Clear to auscultation  Extremities:  no edema  Neurologic:  No focal deficits  Psychiatric:  Appropriate mood and behavior    Lab Results:    CMP:  Recent Labs     05/04/25  0541 05/03/25  0545 05/02/25  0647 05/01/25  0121 12/30/24  0807 11/03/24  0514 11/02/24  0540 11/01/24  1539 11/01/24  1423 11/07/23  0945 08/29/23  1037 02/22/23  1410 02/22/23  1409 09/09/22  0922 07/22/22  1042 07/15/22  0322 07/14/22  1910    137 138 137 138 140 142  --  136   < > 138   < > 139 142   < > 138 141   K 4.7 4.9 4.5 5.2 4.6 4.0 4.7 4.7 6.6*   < > 5.3   < > 4.9 4.4   < > 4.0 4.1    105 106 104 106 104 107  --  106   < > 106   < > 104 107   < > 107 108*   CO2 24 26 24 24 22 26 26  --  19*   < > 26   < > 26 25   < > 24 24   ANIONGAP 12 11 13 14 15 14 14  --  18   < > 11   < > 14 14   < > 11 13   BUN 25* 29* 30* 43* 49* 31* 30*  --  27*   < > 48*   < > 38* 28*   < > 24* 25*   CREATININE 1.09 1.11 1.16 1.45* 1.27 1.18 1.14  --  1.04   < > 1.33*   < > 1.17 1.09   < > 0.91 0.86   EGFR 64 63 60* 46* 54* 59* 61  --  69   < >  --   --   --   --   --   --   --    MG  --  1.94  --   --  1.96  --   --   --  2.04  --  2.42*  --  2.36 2.08  --  2.00 2.11    < > = values in this interval not displayed.     Recent Labs     05/04/25  0541 05/03/25  0545 05/02/25  0647 12/30/24  0807 11/03/24  0514   ALBUMIN 3.4 3.6 3.6 4.3 3.6   ALKPHOS 89 90 88 105 82   ALT 17 17 16 24 21   AST 24 25 24 46* 29   BILITOT 0.9 1.0 1.2 1.6* 2.1*     CBC:  Recent Labs     05/04/25  0541 05/03/25  0545 05/02/25  0647 05/01/25  0121 12/30/24  0807 11/03/24  0513 11/02/24  0540 11/01/24  1423   WBC 5.8  5.2 5.0 6.3 5.9 3.9* 4.5 6.3   HGB 10.0* 10.3* 10.2* 11.0* 12.8* 12.1* 12.2* 13.1*   HCT 29.8* 31.1* 31.2* 33.4* 39.3* 35.6* 36.1* 40.5*    134* 136* 138* 147* 105* 104* 107*   * 101* 102* 102* 97 95 97 99     COAG:   Recent Labs     05/01/25  0121 11/01/24  1704 02/22/23  1651 07/22/22  1042 07/15/22  0322 07/14/22  1748 06/28/22  1407 05/08/22  0756 11/06/20  0856   INR 1.6* 1.3* 1.0 1.0 1.3* 1.2* 1.0  --  1.0   DDIMERVTE  --   --   --   --   --   --   --  834*  --      Cardiology Tests (personally reviewed):    EKG review:  -May 8, 2022: Normal sinus rhythm, first-degree AV block, left bundle branch block  -November 1, 2024: Atrial fibrillation, left bundle branch block  -May 2, 2025: Atrial tachycardia with variable AV block, left bundle branch block  Telemetry May 3, 2025: Atrial fibrillation, controlled ventricular response     Echocardiogram March 6, 2025: Ejection fraction 50 to 55%    Assessment/Plan     1.  Paroxysmal atrial fibrillation: The patient is in atrial tachycardia this morning with variable AV conduction.  Heart rate has been stable.  He also has been in atrial fibrillation at times.  He has been asymptomatic from the bradycardia.  The patient exercises regularly at home and has no functional limitations.    2.  Coronary artery disease: History of a NSTEMI in May 2022.  He had PCI of the LAD, then underwent MitraClip for treatment of severe MR.  Ejection fraction currently 50 to 55% with no signs of CHF.    3.  Hematuria: The patient has a Zarco which is being irrigated continuously.  Unable to use anticoagulation because of the hematuria.    James C Ramicone, DO

## 2025-05-04 NOTE — PROGRESS NOTES
Patrick Vázquez is a 89 y.o. male on day 3 of admission presenting with Urinary retention.      Subjective   Patient fully evaluated  05/02  for    Problem List Items Addressed This Visit       * (Principal) Urinary retention - Primary    Relevant Medications    cephalexin (Keflex) 500 mg capsule    Other Relevant Orders    Referral to Urology    Hematuria     Patient seen resting in bed with head of bed elevated, no s/s or c/o acute difficulties at this time.  Vital signs for last 24 hours Temp:  [35.9 °C (96.6 °F)-36.5 °C (97.7 °F)] 35.9 °C (96.6 °F)  Heart Rate:  [52-62] 52  Resp:  [16-18] 18  BP: ()/(54-72) 109/64  FiO2 (%):  [21 %] 21 %    I/O this shift:  In: -   Out: 2100 [Urine:2100]  Patient still requiring frequent cardiac and SPO2 monitoring. Continue aggressive pulmonary hygiene and oral hygiene. Off loading as tolerated for skin integrity. Medications and labs reviewed-   Results for orders placed or performed during the hospital encounter of 04/30/25 (from the past 24 hours)   Comprehensive Metabolic Panel   Result Value Ref Range    Glucose 102 (H) 74 - 99 mg/dL    Sodium 137 136 - 145 mmol/L    Potassium 4.7 3.5 - 5.3 mmol/L    Chloride 106 98 - 107 mmol/L    Bicarbonate 24 21 - 32 mmol/L    Anion Gap 12 10 - 20 mmol/L    Urea Nitrogen 25 (H) 6 - 23 mg/dL    Creatinine 1.09 0.50 - 1.30 mg/dL    eGFR 64 >60 mL/min/1.73m*2    Calcium 8.6 8.6 - 10.3 mg/dL    Albumin 3.4 3.4 - 5.0 g/dL    Alkaline Phosphatase 89 33 - 136 U/L    Total Protein 5.5 (L) 6.4 - 8.2 g/dL    AST 24 9 - 39 U/L    Bilirubin, Total 0.9 0.0 - 1.2 mg/dL    ALT 17 10 - 52 U/L   CBC and Auto Differential   Result Value Ref Range    WBC 5.8 4.4 - 11.3 x10*3/uL    nRBC 0.0 0.0 - 0.0 /100 WBCs    RBC 2.94 (L) 4.50 - 5.90 x10*6/uL    Hemoglobin 10.0 (L) 13.5 - 17.5 g/dL    Hematocrit 29.8 (L) 41.0 - 52.0 %     (H) 80 - 100 fL    MCH 34.0 26.0 - 34.0 pg    MCHC 33.6 32.0 - 36.0 g/dL    RDW 13.1 11.5 - 14.5 %    Platelets 172 150  - 450 x10*3/uL    Neutrophils % 56.9 40.0 - 80.0 %    Immature Granulocytes %, Automated 0.2 0.0 - 0.9 %    Lymphocytes % 26.1 13.0 - 44.0 %    Monocytes % 11.4 2.0 - 10.0 %    Eosinophils % 4.5 0.0 - 6.0 %    Basophils % 0.9 0.0 - 2.0 %    Neutrophils Absolute 3.29 1.60 - 5.50 x10*3/uL    Immature Granulocytes Absolute, Automated 0.01 0.00 - 0.50 x10*3/uL    Lymphocytes Absolute 1.51 0.80 - 3.00 x10*3/uL    Monocytes Absolute 0.66 0.05 - 0.80 x10*3/uL    Eosinophils Absolute 0.26 0.00 - 0.40 x10*3/uL    Basophils Absolute 0.05 0.00 - 0.10 x10*3/uL      Patient recently received an antibiotic (last 12 hours)       None           Plan discussed with interdisciplinary team, paz with CBI to continue, hematuria still present, no clots, titrate to clear pink per urology, appreciate input. Patient hemoglobin slight drop overnight @ 10.2 from 11.0, creatinine improved @ 1.6, will continue to trend, continue current and repeat labs in the AM.     Discharge planning discussed with patient and care team. Therapy evaluations ordered. Anticipate HHC/SNF at discharge. Patient aware and agreeable to current plan, continue plan as above.     I spent a total of 60 minutes on the date of the service which included preparing to see the patient, face-to-face patient care, completing clinical documentation, obtaining and/or reviewing separately obtained history, performing a medically appropriate examination, counseling and educating the patient/family/caregiver, ordering medications, tests, or procedures, communicating with other HCPs (not separately reported), independently interpreting results (not separately reported), communicating results to the patient/family/caregiver, and care coordination (not separately reported).        Objective     Last Recorded Vitals  /64 (BP Location: Right arm, Patient Position: Lying)   Pulse 52   Temp 35.9 °C (96.6 °F) (Temporal)   Resp 18   Wt 68 kg (150 lb)   SpO2 93%   Intake/Output  last 3 Shifts:    Intake/Output Summary (Last 24 hours) at 5/4/2025 1518  Last data filed at 5/4/2025 0804  Gross per 24 hour   Intake 50 ml   Output 3300 ml   Net -3250 ml       Admission Weight  Weight: 70.3 kg (155 lb) (04/30/25 2024)    Daily Weight  05/03/25 : 68 kg (150 lb)    Image Results  CT abdomen pelvis wo IV contrast  Narrative: Interpreted By:  Raheem Cummins,   STUDY:  CT ABDOMEN PELVIS WO IV CONTRAST;  5/2/2025 9:01 pm      INDICATION:  Signs/Symptoms:hematuria.          COMPARISON:  CT abdomen and pelvis without contrast 30 January 2025      ACCESSION NUMBER(S):  [id number]      ORDERING CLINICIAN:  MAURA EWING      TECHNIQUE:  CT of the abdomen and pelvis from the lung bases through the  symphysis pubis without oral or IV contrast      FINDINGS:  LOWER CHEST: Small free-flowing bilateral pleural effusions both new  from 30 January 2025. Unchanged cardiomegaly      BONES: No acute skeletal findings.      RIGHT KIDNEY AND URETER:  Radiodense stone: Two unchanged less than 3 mm (too small to  accurately measure attenuation) nonobstructing renal stones, one at  each pole. No new stone. None in the ureter noting the distal most  several cm of the ureter are completely obscured by streak artifact  from the hip prostheses Hydronephrosis: Negative  Congenital variant anatomy: Negative. No duplicated ureter or other  variant anatomy. Other: Large but simple parapelvic cyst or cysts  unchanged      LEFT KIDNEY AND URETER:  Radiodense stone: None, noting distal ureter obscured by streak  artifact, but the left kidney had no stone on last CT, still does not  Other: Decompressed by well-positioned Zarco catheter      URINARY BLADDER:  Radiodense stone: Negative  Wall thickening / other inflammatory change: Negative  Diverticula: Negative  Congenital variant anatomy: Negative. No variant anatomy such as  urachal remnant. Other: n/a      LIVER: Normal. No enlargement or evidence of cirrhosis or fatty  change.  No gross evidence of a mass, noting low sensitivity and  specificity without IV contrast.      SPLEEN: Normal. No enlargement.      PANCREAS: Normal. No CT evidence of acute or chronic pancreatitis. No  obvious  duct dilation. No gross evidence of a mass, noting low  sensitivity and specificity without IV contrast.      GALLBLADDER: Normal CT appearance. No dilation, calcified, or  gas-containing stones.  Other types of gallstones could be occult on  CT and detectable only by ultrasound.      BILE DUCTS: Normal. No biliary duct dilation.      ADRENAL GLANDS: Unchanged symmetric mild thickening      LYMPH NODES: No adenopathy, intraperitoneal, retroperitoneal, pelvic,  inguinal or otherwise.      APPENDIX: Normal.  Not dilated, thick walled or in any other way  inflamed in appearance.  No inflammatory change about the appendix.      COLON: Normal. No sign of acute diverticulitis or other colitis. No  annular constricting mass.      SMALL BOWEL: Normal. No small bowel dilation or any other sign of  small bowel obstruction.      STOMACH / DUODENUM: Grossly normal by CT which has limited  sensitivity and specificity for the stomach and duodenum.      RETROPERITONEUM: Normal.  No acute hemorrhage or inflammatory change.  Lymph nodes in a separate dedicated section.      OMENTUM, MESENTERY AND PERITONEAL SPACES:  Free intraperitoneal air: Negative  Free intraperitoneal fluid: Negative  Abscess: Negative  Other: Mild diffuse edema      PELVIS: Lower pelvis obscured by streak artifact from bilateral hip  prostheses      VASCULATURE: Aortic and iliac atherosclerotic calcifications without  aneurysm or other acute finding.      ABDOMINAL WALL:  Hernia: Negative  Other: Subcutaneous edema      Impression: No acute new process in the urinary tract or anywhere else in the  abdomen or pelvis when compared to 30 January 2025      Same two tiny nonobstructing right renal stones, one at each pole      No new stone anywhere in the  urinary tract      No hydroureteronephrosis on either side, only a large but simple  parapelvic cysts at the right renal hilus, a common artifactual mimic  for hydronephrosis      Urinary bladder completely decompressed by well-positioned Zarco  catheter      Diffuse at least mild edema (subcutaneous, mesenteric, and trace deep  pelvic free fluid)      New small bilateral pleural effusions      MACRO:  None      Signed by: Raheem Cummins 5/3/2025 8:17 AM  Dictation workstation:   [id number]      Physical Exam  Vitals and nursing note reviewed.         Relevant Results                      This patient has a urinary catheter   Reason for the urinary catheter remaining today? urinary retention/bladder outlet obstruction, acute or chronic          Assessment & Plan  Urinary retention    DOYLE (acute kidney injury)    Hematuria    Anemia                   Gordo Stevens MD   Physician  Internal Medicine     H&P     Signed     Date of Service: 5/1/2025 12:22 PM     Signed       Expand All Collapse All    History Of Present Illness  Patrick Vázquez is a 89 y.o. male with past medical history significant for prostate cancer, hypertension, heart failure, COPD, diverticulosis, NSTEMI (2022) with stent placement, mitral regurgitation s/p MitraClip presenting to emergency department for evaluation of urinary retention.  Patient states that his urologist placed a Zarco catheter approximately 1 week ago due to urinary retention.  He states that his Zarco catheter was removed around noon and since then he had been unable to urinate.  Patient states he developed lower abdominal pain.  Patient denies nausea, vomiting, diarrhea, recent illness.  Patient denies chest pain or dizziness.  Patient states he is currently anticoagulated on Plavix, baby aspirin, and Eliquis due to a mixup in his medications.  Patient was removed from his Plavix on 4/7/2025 by his cardiologist Dr. Shin, however the patient states that he never actually  stopped taking the Plavix.  He called the cardiologist office on 4/25/2025 when his urologist questioned why he was on Plavix and Eliquis.  Patient stated at that time he would stop taking the Plavix however he now states that he is confused and is unsure if he actually stopped taking it.     In ED, a bladder scan was completed and revealed 440 mL of urine.  A Zarco catheter was inserted and the patient was initially going to be discharged home on Keflex prophylactically to follow-up with his urologist.  Upon discharging the patient he developed jeremiah hematuria which clogged his Zarco catheter.  Patient is currently requiring continuous bladder irrigation.  Basic labs obtained and ordered by me.  PT 17.2, INR 1.6, hemoglobin 11.0, hematocrit 33.4, platelets 138, glucose 106, BUN 43, creatinine 1.45, GFR 46.  Urinalysis showing dark brown turbid urine with 3+ blood and 75 leukocytes, no WBCs.  Consult placed to urology.  Blood pressure currently 119/56, heart rate 66, respirations 18, temperature 36.6 °C, SpO2 96% on room air.  Patient is a DNR/DNI.  Has pitting edema on his bilateral lower ankles, history of heart failure.  No IV fluids given.  Echocardiogram 3/6/2025 with an EF of 50 to 55%, mild AVR.  Patient will be admitted to telemetry under the care of Dr. Stevens who will continue to follow.  I was asked to do H&P and place initial admission orders.  PCP Dr. Sean Godwin.     Prior medical records reviewed by me.     Past Medical History  Prostate cancer, hypertension, CHF, cataract, COPD, diverticulosis, DJD, NSTEMI 2022, mitral regurgitation s/p MitraClip  Surgical History  MitraClip, hip replacement, prostatectomy, cardiac stent placement, cardiac catheterization     Social History  Former smoker, no drug use, social alcohol use, , lives at home with his wife  Family History  Reviewed and noncontributory     Allergies  Bee venom protein (honey bee)     Review of Systems   HENT:  Positive for  "congestion.    Respiratory:  Positive for shortness of breath.    Cardiovascular:  Positive for leg swelling.   Genitourinary:  Positive for decreased urine volume and difficulty urinating.   Neurological:  Positive for weakness.   All other systems reviewed and are negative.     A 10 point review of systems was completed and is negative except what is listed in HPI  Physical Exam  Constitutional:       Appearance: Normal appearance.   HENT:      Mouth/Throat:      Mouth: Mucous membranes are dry.      Pharynx: Oropharynx is clear.   Eyes:      Pupils: Pupils are equal, round, and reactive to light.   Cardiovascular:      Rate and Rhythm: Normal rate and regular rhythm.   Pulmonary:      Effort: Pulmonary effort is normal.      Breath sounds: Normal breath sounds.   Abdominal:      General: Bowel sounds are normal.      Palpations: Abdomen is soft.   Genitourinary:     Comments: Zarco catheter  Musculoskeletal:         General: Normal range of motion.      Cervical back: Normal range of motion.      Comments: \"Sock line\" pitting edema B/L ankles   Skin:     General: Skin is warm and dry.      Capillary Refill: Capillary refill takes less than 2 seconds.   Neurological:      General: No focal deficit present.      Mental Status: He is alert and oriented to person, place, and time.            Last Recorded Vitals  Blood pressure 98/51, pulse 68, temperature 36.6 °C (97.9 °F), resp. rate (!) 22, weight 70.3 kg (155 lb), SpO2 93%.     Relevant Results  No results found.        Results for orders placed or performed during the hospital encounter of 04/30/25 (from the past 24 hours)   Urinalysis with Reflex Culture and Microscopic   Result Value Ref Range     Color, Urine Dark-Brown (N) Light-Yellow, Yellow, Dark-Yellow     Appearance, Urine Ex.Turbid (N) Clear     Specific Gravity, Urine 1.019 1.005 - 1.035     pH, Urine 7.0 5.0, 5.5, 6.0, 6.5, 7.0, 7.5, 8.0     Protein, Urine 300 (3+) (A) NEGATIVE, 10 (TRACE), 20 " (TRACE) mg/dL     Glucose, Urine Normal Normal mg/dL     Blood, Urine OVER (3+) (A) NEGATIVE mg/dL     Ketones, Urine NEGATIVE NEGATIVE mg/dL     Bilirubin, Urine NEGATIVE NEGATIVE mg/dL     Urobilinogen, Urine Normal Normal mg/dL     Nitrite, Urine NEGATIVE NEGATIVE     Leukocyte Esterase, Urine 75 Connie/uL (A) NEGATIVE   Extra Urine Gray Tube   Result Value Ref Range     Extra Tube Hold for add-ons.     Microscopic Only, Urine   Result Value Ref Range     WBC, Urine 1-5 1-5, NONE /HPF     RBC, Urine >20 (A) NONE, 1-2, 3-5 /HPF   Coagulation Screen   Result Value Ref Range     Protime 17.2 (H) 9.8 - 12.4 seconds     INR 1.6 (H) 0.9 - 1.1     aPTT 29 26 - 36 seconds   CBC   Result Value Ref Range     WBC 6.3 4.4 - 11.3 x10*3/uL     nRBC 0.0 0.0 - 0.0 /100 WBCs     RBC 3.28 (L) 4.50 - 5.90 x10*6/uL     Hemoglobin 11.0 (L) 13.5 - 17.5 g/dL     Hematocrit 33.4 (L) 41.0 - 52.0 %      (H) 80 - 100 fL     MCH 33.5 26.0 - 34.0 pg     MCHC 32.9 32.0 - 36.0 g/dL     RDW 13.7 11.5 - 14.5 %     Platelets 138 (L) 150 - 450 x10*3/uL   Basic metabolic panel   Result Value Ref Range     Glucose 106 (H) 74 - 99 mg/dL     Sodium 137 136 - 145 mmol/L     Potassium 5.2 3.5 - 5.3 mmol/L     Chloride 104 98 - 107 mmol/L     Bicarbonate 24 21 - 32 mmol/L     Anion Gap 14 10 - 20 mmol/L     Urea Nitrogen 43 (H) 6 - 23 mg/dL     Creatinine 1.45 (H) 0.50 - 1.30 mg/dL     eGFR 46 (L) >60 mL/min/1.73m*2     Calcium 9.6 8.6 - 10.3 mg/dL         Assessment & Plan  Urinary retention     DOYLE (acute kidney injury)     Hematuria     Anemia        Patrick is a 89-year-old male patient presented to emergency department for evaluation of urinary retention.  Patient with a history of prostate cancer states he had a Zarco catheter placed 1 week ago and was removed at approximately noon today.  Patient states that since then he has been unable to urinate and developed lower abdominal pain.  Patient with a bladder scan of 440 in ED.  Had a Zarco  catheter placed and was initially going to be discharged home on prophylactic Keflex with follow-up to his urologist.  Patient developed jeremiah hematuria which clogged his Paz catheter requiring continuous bladder irrigation.  Patient also has a medication mixup in which she has been taking Plavix, Eliquis, and baby aspirin daily.  Per medical records patient was removed from his Plavix on 4/7/2025 by his cardiologist.  There was a telephone conversation in which this was cleared up again on 4/25/2025 and the patient states he would take that Plavix out of his pillbox but he is unsure whether he did.  Consult placed to urology.  Labs obtained, patient found to have an DOYLE and anemia.  Vital signs within normal limits.  Patient admitted for further medical management.     Urinary retention/DOYLE/hematuria/anemia  Inpatient telemetry admission to Dr. Hilda Paz/continuous bladder irrigation  Consult urology and appreciate input  Hold home anticoagulation including Eliquis, baby aspirin  Patient requiring medication education  Trend hemoglobin  No IV fluids for DOYLE 2/2 history of CHF  Hold home furosemide/Aldactone 2/2 DOYLE  Sock pitting edema noted to bilateral lower extremities  Cardiac/low-sodium diet  Tylenol as needed     Prostate cancer/HTN/CHF/COPD/diverticulosis/CAD/DJD  #Chronic conditions  Continue home atorvastatin 40 mg daily  Continue home inhalers  Hold home Eliquis, baby aspirin, Lasix, Aldactone  Attending to restart medications as appropriate  Echocardiogram on file 3/6/2025: EF 50 to 55%, mild AVR     DVT Ppx  SCDs  No chemical prophylaxis 2/2 hematuria  Up to chair with assistance             Chief Complaint   Patient presents with    Urinary Retention       Had paz removed this am, has not urinated since.         This is a 89 years old male patient presented to the emergency department with a chief complaint of urinary retention.  Stated that today around noon he removed his Paz catheter and he  could not pass urine since then.  Denies any flank pain, abdominal pain, nausea, vomiting, fever, chills, body aches, chest pain.     Review of system: As stated above in the HPI section.     had concerns including Urinary Retention (Had paz removed this am, has not urinated since.).        Problem List Items Addressed This Visit         * (Principal) Urinary retention - Primary     Relevant Medications     cephalexin (Keflex) 500 mg capsule     Other Relevant Orders     Referral to Urology     Hematuria         HPI         Urinary Retention     Additional comments: Had paz removed this am, has not urinated since.            Last edited by Javid Moya RN on 4/30/2025  8:26 PM.             Problem List as of 5/1/2025 Reviewed: 12/30/2024 11:23 PM by BONNIE Cristobal-CNP        Acquired spondylolisthesis of lumbosacral region     COPD (chronic obstructive pulmonary disease) (Multi)     Arthritis of left hip     Coronary artery disease     Diverticulosis     DJD (degenerative joint disease)     Dyslipidemia     HTN (hypertension)     Mitral valve insufficiency     Prostate CA (Multi)     S/P mitral valve clip implantation     Dyspnea on exertion     Cataract, nuclear sclerotic, both eyes     Contusion of hip     Eye strain, bilateral     History of malignant neoplasm of skin     History of repair of hip joint     Hyperopia of both eyes with regular astigmatism     Regular astigmatism of both eyes with presbyopia     Left hip pain     Low back pain     CHF (congestive heart failure), NYHA class II, acute on chronic, diastolic     * (Principal) Urinary retention     DOYLE (acute kidney injury)     Hematuria     Anemia              Active Ambulatory Problems     Diagnosis Date Noted    Acquired spondylolisthesis of lumbosacral region 02/20/2024    COPD (chronic obstructive pulmonary disease) (Multi) 02/20/2024    Arthritis of left hip 02/20/2024    Coronary artery disease 02/20/2024    Diverticulosis 02/20/2024     DJD (degenerative joint disease) 02/20/2024    Dyslipidemia 02/20/2024    HTN (hypertension) 02/20/2024    Mitral valve insufficiency 02/20/2024    Prostate CA (Multi) 02/20/2024    S/P mitral valve clip implantation 02/20/2024    Dyspnea on exertion 02/20/2024    Cataract, nuclear sclerotic, both eyes 05/02/2023    Contusion of hip 03/01/2024    Eye strain, bilateral 03/01/2024    History of malignant neoplasm of skin 03/01/2024    History of repair of hip joint 03/01/2024    Hyperopia of both eyes with regular astigmatism 03/01/2024    Regular astigmatism of both eyes with presbyopia 03/01/2024    Left hip pain 03/01/2024    Low back pain 03/01/2024    CHF (congestive heart failure), NYHA class II, acute on chronic, diastolic 11/01/2024           Resolved Ambulatory Problems     Diagnosis Date Noted    Congestive heart failure 03/01/2024    Hyperlipoproteinemia 02/20/2024           Past Medical History:   Diagnosis Date    Cataract      CHF (congestive heart failure)      Hypertension      Personal history of malignant neoplasm of prostate      Personal history of other malignant neoplasm of skin                      Active Orders   Procedures     Irrigation of Bladder       Frequency: Once       Number of Occurrences: 1 Occurrences       Order Comments: Please proceed with continuous bladder irrigation.  Thank you      Diet     Adult diet Cardiac; 70 gm fat; 2 - 3 grams Sodium       Frequency: Effective now       Number of Occurrences: Until Specified   Nursing     Activity (specify) Activity With Exceptions; Up in chair       Frequency: Until discontinued       Number of Occurrences: Until Specified     Apply sequential compression device       Frequency: Until discontinued       Number of Occurrences: Until Specified     Height on admission       Frequency: Once       Number of Occurrences: 1 Occurrences     Maintain Urethral Catheter with Nurse Driven Indwelling Urethral Catheter Removal Protocol        Frequency: Until discontinued       Number of Occurrences: Until Specified     Maintain Urethral Catheter with Nurse Driven Indwelling Urethral Catheter Removal Protocol       Frequency: Until discontinued       Number of Occurrences: Until Specified     No Isolation Required       Frequency: Once       Number of Occurrences: 1 Occurrences     Notify provider       Frequency: Until discontinued       Number of Occurrences: Until Specified     Nursing Guidelines: Paz Cath       Frequency: Until discontinued       Number of Occurrences: Until Specified       Order Comments: 1. Perform a Post Voiding Residual (PVR) using the Bladder Volume Control Indicator (BVI) the first two voidings after a paz catheter is removed.  If the PVR is greater than 450cc perform straight cath.  If the patient requires a second straight cath procedure notify physician on the next two rounds.  2. If the patient has not voided after 8 hours check volume with the BVI and straight cath if over 450cc.  3. If no voiding at discharge reinsert indwelling urinary catheter and recommend referral to Urology Clinic at discharge.   4. Notify physician of indwelling urinary catheter replacement        Pain Assessment       Frequency: Per unit standards       Number of Occurrences: Until Specified     Physician Communication       Frequency: Until discontinued       Number of Occurrences: Until Specified       Order Comments: IF patient requires a second straight cath procedure after paz removed, notify physician on the next rounds        Vital Signs       Frequency: q4h       Number of Occurrences: Until Specified     Weight on admission       Frequency: Once       Number of Occurrences: 1 Occurrences   Consult     Inpatient consult to Social Work and TCC       Frequency: Once       Number of Occurrences: 1 Occurrences     Inpatient consult to Urology       Frequency: Once       Number of Occurrences: 1 Occurrences   Nourishments     May  Participate in Room Service       Frequency: Once       Number of Occurrences: 1 Occurrences   Respiratory Care     Respiratory care eval and treat       Frequency: Once       Number of Occurrences: 1 Occurrences   ECG     ECG 12 lead       Frequency: Once       Number of Occurrences: 1 Occurrences     Electrocardiogram, 12-lead PRN ACS symptoms       Frequency: PRN       Number of Occurrences: Until Specified       Order Comments: Notify provider if performed.      Telemetry     Telemetry monitoring - Floor only       Frequency: Until discontinued       Number of Occurrences: 3 Days   Medications     acetaminophen (Tylenol) tablet 650 mg       Frequency: q4h PRN       Dose: 650 mg       Route: oral     albuterol 90 mcg/actuation inhaler 2 puff       Frequency: q6h PRN       Dose: 2 puff       Route: inhalation     atorvastatin (Lipitor) tablet 40 mg       Frequency: Daily       Dose: 40 mg       Route: oral     fluticasone furoate-vilanteroL (Breo Ellipta) 200-25 mcg/dose inhaler 1 puff       Linked Order: And       Frequency: Daily       Dose: 1 puff       Route: inhalation     furosemide (Lasix) tablet 20 mg       Frequency: Every other day       Dose: 20 mg       Route: oral     spironolactone (Aldactone) tablet 12.5 mg       Frequency: Daily       Dose: 12.5 mg       Route: oral     tiotropium (Spiriva Respimat) 2.5 mcg/actuation inhaler 2 puff       Linked Order: And       Frequency: Daily       Dose: 2 puff       Route: inhalation      Dietary Orders (From admission, onward)          Start     Ordered     05/01/25 0213   May Participate in Room Service  ( ROOM SERVICE MAY PARTICIPATE)  Once        Question:  .  Answer:  Yes    05/01/25 0212 05/01/25 0012   Adult diet Cardiac; 70 gm fat; 2 - 3 grams Sodium  Diet effective now        Question Answer Comment   Diet type Cardiac     Fat restriction: 70 gm fat     Sodium restriction: 2 - 3 grams Sodium         05/01/25 0011                       89  yrs@  ADMITDTTM@  Urinary retention [R33.9]  Hematuria, unspecified type [R31.9]  [unfilled]  Weight: 70.3 kg (155 lb)     Vitals          Vitals:     04/30/25 2024 04/30/25 2100 04/30/25 2200 05/01/25 0209   BP: 119/56 116/64   107/67   Pulse: 83 56 66 58   Resp: (!) 22         Temp: 36.6 °C (97.9 °F)     36.5 °C (97.7 °F)   SpO2: 99% 100% 98% 98%   Weight: 70.3 kg (155 lb)           05/01/25 0351 05/01/25 0800 05/01/25 1129   BP: 109/67 112/61 98/51   Pulse: 57 60 68   Resp:         Temp: 36.3 °C (97.3 °F) 36.4 °C (97.5 °F) 36.6 °C (97.9 °F)   SpO2: 97% (!) 89% 93%   Weight:                        Chief Complaint    Urinary Retention            Patient seen resting in bed with head of bed elevated, no s/s or c/o acute difficulties at this time.  Vital signs for last 24 hours Temp:  [36.3 °C (97.3 °F)-36.6 °C (97.9 °F)] 36.6 °C (97.9 °F)  Heart Rate:  [56-83] 68  Resp:  [22] 22  BP: ()/(51-67) 98/51    I/O this shift:  In: -   Out: 600 [Urine:600]  Patient still requiring frequent cardiac and SPO2 monitoring. Continue aggressive pulmonary hygiene and oral hygiene. Off loading as tolerated for skin integrity. Medications and labs reviewed-    Patient recently received an antibiotic (last 12 hours)         None                    Results for orders placed or performed during the hospital encounter of 04/30/25 (from the past 96 hours)   Urinalysis with Reflex Culture and Microscopic   Result Value Ref Range     Color, Urine Dark-Brown (N) Light-Yellow, Yellow, Dark-Yellow     Appearance, Urine Ex.Turbid (N) Clear     Specific Gravity, Urine 1.019 1.005 - 1.035     pH, Urine 7.0 5.0, 5.5, 6.0, 6.5, 7.0, 7.5, 8.0     Protein, Urine 300 (3+) (A) NEGATIVE, 10 (TRACE), 20 (TRACE) mg/dL     Glucose, Urine Normal Normal mg/dL     Blood, Urine OVER (3+) (A) NEGATIVE mg/dL     Ketones, Urine NEGATIVE NEGATIVE mg/dL     Bilirubin, Urine NEGATIVE NEGATIVE mg/dL     Urobilinogen, Urine Normal Normal mg/dL     Nitrite, Urine  NEGATIVE NEGATIVE     Leukocyte Esterase, Urine 75 Connie/uL (A) NEGATIVE   Extra Urine Gray Tube   Result Value Ref Range     Extra Tube Hold for add-ons.     Microscopic Only, Urine   Result Value Ref Range     WBC, Urine 1-5 1-5, NONE /HPF     RBC, Urine >20 (A) NONE, 1-2, 3-5 /HPF   Coagulation Screen   Result Value Ref Range     Protime 17.2 (H) 9.8 - 12.4 seconds     INR 1.6 (H) 0.9 - 1.1     aPTT 29 26 - 36 seconds   CBC   Result Value Ref Range     WBC 6.3 4.4 - 11.3 x10*3/uL     nRBC 0.0 0.0 - 0.0 /100 WBCs     RBC 3.28 (L) 4.50 - 5.90 x10*6/uL     Hemoglobin 11.0 (L) 13.5 - 17.5 g/dL     Hematocrit 33.4 (L) 41.0 - 52.0 %      (H) 80 - 100 fL     MCH 33.5 26.0 - 34.0 pg     MCHC 32.9 32.0 - 36.0 g/dL     RDW 13.7 11.5 - 14.5 %     Platelets 138 (L) 150 - 450 x10*3/uL   Basic metabolic panel   Result Value Ref Range     Glucose 106 (H) 74 - 99 mg/dL     Sodium 137 136 - 145 mmol/L     Potassium 5.2 3.5 - 5.3 mmol/L     Chloride 104 98 - 107 mmol/L     Bicarbonate 24 21 - 32 mmol/L     Anion Gap 14 10 - 20 mmol/L     Urea Nitrogen 43 (H) 6 - 23 mg/dL     Creatinine 1.45 (H) 0.50 - 1.30 mg/dL     eGFR 46 (L) >60 mL/min/1.73m*2     Calcium 9.6 8.6 - 10.3 mg/dL      Patient fully evaluated 05/01, thorough record review performed of previous labs and notes from prior encounters. Plan discussed with interdisciplinary team, consults placed, appreciate input. Will continue current and repeat labs in the AM.       Discharge planning discussed with patient and care team. Therapy evaluations ordered. Patient aware and agreeable to current plan, continue plan as above.      I spent a total of 75 minutes on the date of the service which included preparing to see the patient, face-to-face patient care, completing clinical documentation, obtaining and/or reviewing separately obtained history, performing a medically appropriate examination, counseling and educating the patient/family/caregiver, ordering medications,  tests, or procedures, communicating with other HCPs (not separately reported), independently interpreting results (not separately reported), communicating results to the patient/family/caregiver, and care coordination (not separately reported).            Sangeetha Tim                   Revision History        Patient fully evaluated on May 3.  H&H are stable.  Still with significant mobility issues.  Recheck labs in AM.  To have recheck labs in AM.  Still having hematuria.  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  Continue present IV antibiotics for contaminated urine.    Patient fully evaluated  05/04  for    Problem List Items Addressed This Visit       * (Principal) Urinary retention - Primary    Relevant Medications    cephalexin (Keflex) 500 mg capsule    Other Relevant Orders    Referral to Urology    Hematuria     Patient seen resting in bed with head of bed elevated, no s/s or c/o acute difficulties at this time.  Vital signs for last 24 hours Temp:  [35.9 °C (96.6 °F)-36.5 °C (97.7 °F)] 35.9 °C (96.6 °F)  Heart Rate:  [52-62] 52  Resp:  [16-18] 18  BP: ()/(54-72) 109/64  FiO2 (%):  [21 %] 21 %    I/O this shift:  In: -   Out: 2100 [Urine:2100]  Patient still requiring frequent cardiac and SPO2 monitoring. Continue aggressive pulmonary hygiene and oral hygiene. Off loading as tolerated for skin integrity. Medications and labs reviewed-   Results for orders placed or performed during the hospital encounter of 04/30/25 (from the past 24 hours)   Comprehensive Metabolic Panel   Result Value Ref Range    Glucose 102 (H) 74 - 99 mg/dL    Sodium 137 136 - 145 mmol/L    Potassium 4.7 3.5 - 5.3 mmol/L    Chloride 106 98 - 107 mmol/L    Bicarbonate 24 21 - 32 mmol/L    Anion Gap 12 10 - 20 mmol/L    Urea Nitrogen 25 (H) 6 - 23 mg/dL    Creatinine 1.09 0.50 - 1.30 mg/dL    eGFR 64 >60 mL/min/1.73m*2    Calcium 8.6 8.6 - 10.3 mg/dL    Albumin 3.4 3.4 - 5.0 g/dL    Alkaline Phosphatase 89 33 - 136 U/L    Total Protein  5.5 (L) 6.4 - 8.2 g/dL    AST 24 9 - 39 U/L    Bilirubin, Total 0.9 0.0 - 1.2 mg/dL    ALT 17 10 - 52 U/L   CBC and Auto Differential   Result Value Ref Range    WBC 5.8 4.4 - 11.3 x10*3/uL    nRBC 0.0 0.0 - 0.0 /100 WBCs    RBC 2.94 (L) 4.50 - 5.90 x10*6/uL    Hemoglobin 10.0 (L) 13.5 - 17.5 g/dL    Hematocrit 29.8 (L) 41.0 - 52.0 %     (H) 80 - 100 fL    MCH 34.0 26.0 - 34.0 pg    MCHC 33.6 32.0 - 36.0 g/dL    RDW 13.1 11.5 - 14.5 %    Platelets 172 150 - 450 x10*3/uL    Neutrophils % 56.9 40.0 - 80.0 %    Immature Granulocytes %, Automated 0.2 0.0 - 0.9 %    Lymphocytes % 26.1 13.0 - 44.0 %    Monocytes % 11.4 2.0 - 10.0 %    Eosinophils % 4.5 0.0 - 6.0 %    Basophils % 0.9 0.0 - 2.0 %    Neutrophils Absolute 3.29 1.60 - 5.50 x10*3/uL    Immature Granulocytes Absolute, Automated 0.01 0.00 - 0.50 x10*3/uL    Lymphocytes Absolute 1.51 0.80 - 3.00 x10*3/uL    Monocytes Absolute 0.66 0.05 - 0.80 x10*3/uL    Eosinophils Absolute 0.26 0.00 - 0.40 x10*3/uL    Basophils Absolute 0.05 0.00 - 0.10 x10*3/uL      Patient recently received an antibiotic (last 12 hours)       None           Plan discussed with interdisciplinary team, patient continues with ambulatory dysfunction and significant mobility issues. Hematuria continues, will monitor H&H, strict intake and output. 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  Continue present IV antibiotics, continue current and repeat labs in the AM.     Discharge planning discussed with patient and care team. Therapy evaluations ordered. Anticipate HHC/SNF at discharge. Patient aware and agreeable to current plan, continue plan as above.     I spent a total of 60 minutes on the date of the service which included preparing to see the patient, face-to-face patient care, completing clinical documentation, obtaining and/or reviewing separately obtained history, performing a medically appropriate examination, counseling and educating the patient/family/caregiver, ordering medications,  tests, or procedures, communicating with other HCPs (not separately reported), independently interpreting results (not separately reported), communicating results to the patient/family/caregiver, and care coordination (not separately reported).   Sangeetha Dumont

## 2025-05-04 NOTE — PROGRESS NOTES
"Patrick Vázquez is a 89 y.o. male on day 3 of admission presenting with Urinary retention.    Subjective   Hematuria slightly improved on modest cbi  Clear cherry  H/H holding att 10/29.8  Did not require hand irrigation yesterday or last night       Objective     Physical Exam    Last Recorded Vitals  Blood pressure 97/54, pulse 54, temperature 36.1 °C (97 °F), temperature source Temporal, resp. rate 18, height 1.727 m (5' 8\"), weight 68 kg (150 lb), SpO2 96%.  Intake/Output last 3 Shifts:  I/O last 3 completed shifts:  In: 50 (0.7 mL/kg) [IV Piggyback:50]  Out: 3000 (42.7 mL/kg) [Urine:3000 (1.2 mL/kg/hr)]  Dosing Weight: 70.3 kg     Relevant Results  Results for orders placed or performed during the hospital encounter of 04/30/25 (from the past 24 hours)   Comprehensive Metabolic Panel   Result Value Ref Range    Glucose 102 (H) 74 - 99 mg/dL    Sodium 137 136 - 145 mmol/L    Potassium 4.7 3.5 - 5.3 mmol/L    Chloride 106 98 - 107 mmol/L    Bicarbonate 24 21 - 32 mmol/L    Anion Gap 12 10 - 20 mmol/L    Urea Nitrogen 25 (H) 6 - 23 mg/dL    Creatinine 1.09 0.50 - 1.30 mg/dL    eGFR 64 >60 mL/min/1.73m*2    Calcium 8.6 8.6 - 10.3 mg/dL    Albumin 3.4 3.4 - 5.0 g/dL    Alkaline Phosphatase 89 33 - 136 U/L    Total Protein 5.5 (L) 6.4 - 8.2 g/dL    AST 24 9 - 39 U/L    Bilirubin, Total 0.9 0.0 - 1.2 mg/dL    ALT 17 10 - 52 U/L   CBC and Auto Differential   Result Value Ref Range    WBC 5.8 4.4 - 11.3 x10*3/uL    nRBC 0.0 0.0 - 0.0 /100 WBCs    RBC 2.94 (L) 4.50 - 5.90 x10*6/uL    Hemoglobin 10.0 (L) 13.5 - 17.5 g/dL    Hematocrit 29.8 (L) 41.0 - 52.0 %     (H) 80 - 100 fL    MCH 34.0 26.0 - 34.0 pg    MCHC 33.6 32.0 - 36.0 g/dL    RDW 13.1 11.5 - 14.5 %    Platelets 172 150 - 450 x10*3/uL    Neutrophils % 56.9 40.0 - 80.0 %    Immature Granulocytes %, Automated 0.2 0.0 - 0.9 %    Lymphocytes % 26.1 13.0 - 44.0 %    Monocytes % 11.4 2.0 - 10.0 %    Eosinophils % 4.5 0.0 - 6.0 %    Basophils % 0.9 0.0 - 2.0 %    " Neutrophils Absolute 3.29 1.60 - 5.50 x10*3/uL    Immature Granulocytes Absolute, Automated 0.01 0.00 - 0.50 x10*3/uL    Lymphocytes Absolute 1.51 0.80 - 3.00 x10*3/uL    Monocytes Absolute 0.66 0.05 - 0.80 x10*3/uL    Eosinophils Absolute 0.26 0.00 - 0.40 x10*3/uL    Basophils Absolute 0.05 0.00 - 0.10 x10*3/uL                                  Assessment & Plan  Urinary retention    DOYLE (acute kidney injury)    Hematuria    Anemia    Hematuria with some improvement  Discussed with dr mcnair  If persists may require cystoscopy and fulguration of bleeders  Will make npo after midnight  Reassess in morning      I spent 20 minutes in the professional and overall care of this patient.      Perry Ramirez PA-C

## 2025-05-05 LAB
ALBUMIN SERPL BCP-MCNC: 3.6 G/DL (ref 3.4–5)
ALP SERPL-CCNC: 94 U/L (ref 33–136)
ALT SERPL W P-5'-P-CCNC: 19 U/L (ref 10–52)
ANION GAP SERPL CALC-SCNC: 12 MMOL/L (ref 10–20)
AST SERPL W P-5'-P-CCNC: 28 U/L (ref 9–39)
ATRIAL RATE: 84 BPM
BASOPHILS # BLD AUTO: 0.06 X10*3/UL (ref 0–0.1)
BASOPHILS NFR BLD AUTO: 1 %
BILIRUB SERPL-MCNC: 1 MG/DL (ref 0–1.2)
BUN SERPL-MCNC: 26 MG/DL (ref 6–23)
CALCIUM SERPL-MCNC: 9 MG/DL (ref 8.6–10.3)
CHLORIDE SERPL-SCNC: 104 MMOL/L (ref 98–107)
CO2 SERPL-SCNC: 25 MMOL/L (ref 21–32)
CREAT SERPL-MCNC: 1.18 MG/DL (ref 0.5–1.3)
EGFRCR SERPLBLD CKD-EPI 2021: 59 ML/MIN/1.73M*2
EOSINOPHIL # BLD AUTO: 0.19 X10*3/UL (ref 0–0.4)
EOSINOPHIL NFR BLD AUTO: 3.1 %
ERYTHROCYTE [DISTWIDTH] IN BLOOD BY AUTOMATED COUNT: 13.2 % (ref 11.5–14.5)
GLUCOSE SERPL-MCNC: 100 MG/DL (ref 74–99)
HCT VFR BLD AUTO: 30.1 % (ref 41–52)
HGB BLD-MCNC: 9.8 G/DL (ref 13.5–17.5)
HOLD SPECIMEN: 293
IMM GRANULOCYTES # BLD AUTO: 0.01 X10*3/UL (ref 0–0.5)
IMM GRANULOCYTES NFR BLD AUTO: 0.2 % (ref 0–0.9)
LYMPHOCYTES # BLD AUTO: 1.74 X10*3/UL (ref 0.8–3)
LYMPHOCYTES NFR BLD AUTO: 28.8 %
MCH RBC QN AUTO: 33.2 PG (ref 26–34)
MCHC RBC AUTO-ENTMCNC: 32.6 G/DL (ref 32–36)
MCV RBC AUTO: 102 FL (ref 80–100)
MONOCYTES # BLD AUTO: 0.59 X10*3/UL (ref 0.05–0.8)
MONOCYTES NFR BLD AUTO: 9.8 %
NEUTROPHILS # BLD AUTO: 3.45 X10*3/UL (ref 1.6–5.5)
NEUTROPHILS NFR BLD AUTO: 57.1 %
NRBC BLD-RTO: 0 /100 WBCS (ref 0–0)
PLATELET # BLD AUTO: 188 X10*3/UL (ref 150–450)
POTASSIUM SERPL-SCNC: 4.8 MMOL/L (ref 3.5–5.3)
PROT SERPL-MCNC: 5.8 G/DL (ref 6.4–8.2)
Q ONSET: 216 MS
QRS COUNT: 10 BEATS
QRS DURATION: 160 MS
QT INTERVAL: 466 MS
QTC CALCULATION(BAZETT): 461 MS
QTC FREDERICIA: 463 MS
R AXIS: 99 DEGREES
RBC # BLD AUTO: 2.95 X10*6/UL (ref 4.5–5.9)
SODIUM SERPL-SCNC: 136 MMOL/L (ref 136–145)
T AXIS: -7 DEGREES
T OFFSET: 449 MS
VENTRICULAR RATE: 59 BPM
WBC # BLD AUTO: 6 X10*3/UL (ref 4.4–11.3)

## 2025-05-05 PROCEDURE — 94640 AIRWAY INHALATION TREATMENT: CPT

## 2025-05-05 PROCEDURE — 2060000001 HC INTERMEDIATE ICU ROOM DAILY

## 2025-05-05 PROCEDURE — 99232 SBSQ HOSP IP/OBS MODERATE 35: CPT | Performed by: INTERNAL MEDICINE

## 2025-05-05 PROCEDURE — 80053 COMPREHEN METABOLIC PANEL: CPT | Performed by: INTERNAL MEDICINE

## 2025-05-05 PROCEDURE — 2500000004 HC RX 250 GENERAL PHARMACY W/ HCPCS (ALT 636 FOR OP/ED): Mod: JZ | Performed by: INTERNAL MEDICINE

## 2025-05-05 PROCEDURE — 2500000001 HC RX 250 WO HCPCS SELF ADMINISTERED DRUGS (ALT 637 FOR MEDICARE OP): Performed by: NURSE PRACTITIONER

## 2025-05-05 PROCEDURE — 36415 COLL VENOUS BLD VENIPUNCTURE: CPT | Performed by: INTERNAL MEDICINE

## 2025-05-05 PROCEDURE — 2500000001 HC RX 250 WO HCPCS SELF ADMINISTERED DRUGS (ALT 637 FOR MEDICARE OP)

## 2025-05-05 PROCEDURE — 2500000001 HC RX 250 WO HCPCS SELF ADMINISTERED DRUGS (ALT 637 FOR MEDICARE OP): Performed by: PHYSICIAN ASSISTANT

## 2025-05-05 PROCEDURE — 85025 COMPLETE CBC W/AUTO DIFF WBC: CPT | Performed by: INTERNAL MEDICINE

## 2025-05-05 RX ADMIN — TRAMADOL HYDROCHLORIDE 25 MG: 50 TABLET, COATED ORAL at 16:34

## 2025-05-05 RX ADMIN — ACETAMINOPHEN 650 MG: 325 TABLET, FILM COATED ORAL at 20:55

## 2025-05-05 RX ADMIN — TIOTROPIUM BROMIDE INHALATION SPRAY 2 PUFF: 3.12 SPRAY, METERED RESPIRATORY (INHALATION) at 07:28

## 2025-05-05 RX ADMIN — FLUTICASONE FUROATE AND VILANTEROL TRIFENATATE 1 PUFF: 200; 25 POWDER RESPIRATORY (INHALATION) at 07:29

## 2025-05-05 RX ADMIN — ATORVASTATIN CALCIUM 40 MG: 40 TABLET, FILM COATED ORAL at 09:00

## 2025-05-05 RX ADMIN — CEFTRIAXONE SODIUM 1 G: 1 INJECTION, SOLUTION INTRAVENOUS at 20:19

## 2025-05-05 RX ADMIN — TRAMADOL HYDROCHLORIDE 50 MG: 50 TABLET, COATED ORAL at 16:18

## 2025-05-05 RX ADMIN — FINASTERIDE 5 MG: 5 TABLET, FILM COATED ORAL at 09:00

## 2025-05-05 ASSESSMENT — COGNITIVE AND FUNCTIONAL STATUS - GENERAL
MOVING TO AND FROM BED TO CHAIR: A LITTLE
HELP NEEDED FOR BATHING: A LITTLE
WALKING IN HOSPITAL ROOM: A LITTLE
MOBILITY SCORE: 19
DRESSING REGULAR LOWER BODY CLOTHING: A LITTLE
CLIMB 3 TO 5 STEPS WITH RAILING: A LITTLE
STANDING UP FROM CHAIR USING ARMS: A LITTLE
DAILY ACTIVITIY SCORE: 19
PERSONAL GROOMING: A LITTLE
TOILETING: A LITTLE
MOVING TO AND FROM BED TO CHAIR: A LITTLE
WALKING IN HOSPITAL ROOM: A LITTLE
PERSONAL GROOMING: A LITTLE
DRESSING REGULAR UPPER BODY CLOTHING: A LITTLE
TOILETING: A LITTLE
DRESSING REGULAR UPPER BODY CLOTHING: A LITTLE
STANDING UP FROM CHAIR USING ARMS: A LITTLE
DAILY ACTIVITIY SCORE: 19
MOBILITY SCORE: 20
DRESSING REGULAR LOWER BODY CLOTHING: A LITTLE
HELP NEEDED FOR BATHING: A LITTLE
CLIMB 3 TO 5 STEPS WITH RAILING: A LOT

## 2025-05-05 ASSESSMENT — PAIN SCALES - GENERAL
PAINLEVEL_OUTOF10: 4
PAINLEVEL_OUTOF10: 8
PAINLEVEL_OUTOF10: 6

## 2025-05-05 ASSESSMENT — PAIN DESCRIPTION - DESCRIPTORS: DESCRIPTORS: ACHING

## 2025-05-05 ASSESSMENT — PAIN - FUNCTIONAL ASSESSMENT
PAIN_FUNCTIONAL_ASSESSMENT: 0-10

## 2025-05-05 ASSESSMENT — PAIN DESCRIPTION - LOCATION
LOCATION: GROIN
LOCATION: PELVIS

## 2025-05-05 NOTE — PROGRESS NOTES
History Of Present Illness:      The patient is still having hematuria.  From a cardiac perspective, no new complaints    Past medical history:     Prostate cancer  Hypertension  CAD with history of NSTEMI in 2022  Mitral regurgitation with history of MitraClip     Past surgical history:     Hip replacement  Prostatectomy  Stent placement     Social history: Former smoker     Family history: Negative for premature CAD    Review of Systems  Other review of systems negative  Last Recorded Vitals:      5/4/2025     4:41 AM 5/4/2025     8:00 AM 5/4/2025    11:49 AM 5/4/2025     4:29 PM 5/4/2025     8:38 PM 5/4/2025    11:00 PM 5/5/2025     3:48 AM   Vitals   Systolic 103 97 109 102 115 119 110   Diastolic 58 54 64 58 71 66 60   BP Location Right arm Right arm Right arm Right arm Right arm Right arm    Heart Rate 61 54 52 51 66 90 63   Temp 36.5 °C (97.7 °F) 36.1 °C (97 °F) 35.9 °C (96.6 °F) 36 °C (96.8 °F) 37.4 °C (99.3 °F) 36.7 °C (98.1 °F) 36.5 °C (97.7 °F)   Resp 16 18 18 18 18 17 16     Allergies:  Bee venom protein (honey bee)  Outpatient Medications:  Current Outpatient Medications   Medication Instructions    acetaminophen (TYLENOL) 650 mg, oral, Every 6 hours PRN    albuterol 90 mcg/actuation inhaler 2 puffs, Every 6 hours PRN    amLODIPine (NORVASC) 2.5 mg, Daily    apixaban (ELIQUIS) 5 mg, oral, Every 12 hours    ascorbic acid (VITAMIN C) 500 mg, Daily    atorvastatin (LIPITOR) 40 mg, oral, Daily    Breztri Aerosphere 160-9-4.8 mcg/actuation HFA aerosol inhaler 2 puffs, 2 times daily    cephalexin (KEFLEX) 500 mg, oral, 4 times daily    clopidogrel (Plavix) 75 mg tablet 1 tablet, Daily (0630)    cyclobenzaprine (FLEXERIL) 5 mg, oral, 3 times daily    furosemide (LASIX) 20 mg, oral, Every other day    ibuprofen 600 mg, oral, Every 8 hours PRN    metoprolol succinate XL (TOPROL-XL) 12.5 mg, oral, Daily    multivitamin tablet 1 tablet, Daily    olmesartan (BENICAR) 20 mg, Daily    spironolactone (ALDACTONE)  12.5 mg, oral, Daily       Physical Exam:    General Appearance:  Alert, oriented, no distress  Skin:  Warm and dry  Head and Neck:  No elevation of JVP, no carotid bruits  Cardiac Exam:  Rhythm is regular, S1 and S2 are normal, no murmur S3 or S4  Lungs:  Clear to auscultation  Extremities:  no edema  Neurologic:  No focal deficits  Psychiatric:  Appropriate mood and behavior    Lab Results:    CMP:  Recent Labs     05/05/25  0556 05/04/25  0541 05/03/25  0545 05/02/25  0647 05/01/25  0121 12/30/24  0807 11/03/24  0514 11/02/24  0540 11/01/24  1539 11/01/24  1423 11/07/23  0945 08/29/23  1037 02/22/23  1410 02/22/23  1409 09/09/22  0922 07/22/22  1042 07/15/22  0322 07/14/22  1910    137 137 138 137 138 140 142  --  136   < > 138   < > 139 142   < > 138 141   K 4.8 4.7 4.9 4.5 5.2 4.6 4.0 4.7   < > 6.6*   < > 5.3   < > 4.9 4.4   < > 4.0 4.1    106 105 106 104 106 104 107  --  106   < > 106   < > 104 107   < > 107 108*   CO2 25 24 26 24 24 22 26 26  --  19*   < > 26   < > 26 25   < > 24 24   ANIONGAP 12 12 11 13 14 15 14 14  --  18   < > 11   < > 14 14   < > 11 13   BUN 26* 25* 29* 30* 43* 49* 31* 30*  --  27*   < > 48*   < > 38* 28*   < > 24* 25*   CREATININE 1.18 1.09 1.11 1.16 1.45* 1.27 1.18 1.14  --  1.04   < > 1.33*   < > 1.17 1.09   < > 0.91 0.86   EGFR 59* 64 63 60* 46* 54* 59* 61  --  69   < >  --   --   --   --   --   --   --    MG  --   --  1.94  --   --  1.96  --   --   --  2.04  --  2.42*  --  2.36 2.08  --  2.00 2.11    < > = values in this interval not displayed.     Recent Labs     05/05/25  0556 05/04/25  0541 05/03/25  0545 05/02/25  0647 12/30/24  0807   ALBUMIN 3.6 3.4 3.6 3.6 4.3   ALKPHOS 94 89 90 88 105   ALT 19 17 17 16 24   AST 28 24 25 24 46*   BILITOT 1.0 0.9 1.0 1.2 1.6*     CBC:  Recent Labs     05/05/25  0556 05/04/25  0541 05/03/25  0545 05/02/25  0647 05/01/25  0121 12/30/24  0807 11/03/24  0513 11/02/24  0540   WBC 6.0 5.8 5.2 5.0 6.3 5.9 3.9* 4.5   HGB 9.8* 10.0* 10.3*  10.2* 11.0* 12.8* 12.1* 12.2*   HCT 30.1* 29.8* 31.1* 31.2* 33.4* 39.3* 35.6* 36.1*    172 134* 136* 138* 147* 105* 104*   * 101* 101* 102* 102* 97 95 97     COAG:   Recent Labs     05/01/25  0121 11/01/24  1704 02/22/23  1651 07/22/22  1042 07/15/22  0322 07/14/22  1748 06/28/22  1407 05/08/22  0756 11/06/20  0856   INR 1.6* 1.3* 1.0 1.0 1.3* 1.2* 1.0  --  1.0   DDIMERVTE  --   --   --   --   --   --   --  834*  --      Cardiology Tests (personally reviewed):    EKG review:  -May 8, 2022: Normal sinus rhythm, first-degree AV block, left bundle branch block  -November 1, 2024: Atrial fibrillation, left bundle branch block  -May 2, 2025: Atrial tachycardia with variable AV block, left bundle branch block  Telemetry May 3, 2025: Atrial fibrillation, controlled ventricular response     Echocardiogram March 6, 2025: Ejection fraction 50 to 55%    Assessment/Plan     1.  Paroxysmal atrial fibrillation: The patient is in atrial tachycardia again this morning and this changes periodically to atrial fibrillation.  The ventricular response has been controlled.  He has not experienced symptomatic bradycardia and there has been no pacemaker indication.  The patient reports that he exercises regularly at home and is able to ride a stationary bike with no limitations.  Cardiac status unchanged at this time.      2.  Coronary artery disease: History of a NSTEMI in May 2022.  He had PCI of the LAD, then underwent MitraClip for treatment of severe MR.  Ejection fraction currently 50 to 55% with no signs of CHF.    3.  Hematuria: The patient has a Zarco which is being irrigated continuously.  Unable to use anticoagulation because of the hematuria.    James C Ramicone, DO

## 2025-05-05 NOTE — CARE PLAN
The patient's goals for the shift include      The clinical goals for the shift include patient will remain hemodynamically stable througout the fshift    Over the shift, the patient skin integrity will be maintained bu turning and repositioning him

## 2025-05-05 NOTE — CARE PLAN
The patient's goals for the shift include      The clinical goals for the shift include patient will remain hemodynamically stable throughout the shift    Over the shift, the patient did not make progress toward the following goals. Barriers to progression include . Recommendations to address these barriers include .

## 2025-05-05 NOTE — DOCUMENTATION CLARIFICATION NOTE
"    PATIENT:               RAMSES VALENTE  ACCT #:                  [id number]  MRN:                       [id number]  :                       1935  ADMIT DATE:       2025 8:16 PM  DISCH DATE:  RESPONDING PROVIDER #:        [id number]          PROVIDER RESPONSE TEXT:    Chronic Diastolic Congestive Heart Failure    CDI QUERY TEXT:    Clarification        Instruction:    Based on your assessment of the patient and the clinical information, please provide the requested documentation by clicking on the appropriate radio button and enter any additional information if prompted.    Question: Please further clarify the type and acuity of congestive heart failure    When answering this query, please exercise your independent professional judgment. The fact that a question is being asked, does not imply that any particular answer is desired or expected.    The patient's clinical indicators include:  Clinical Information:  89 years old male patient presented to the emergency department with a chief complaint of urinary retention    Clinical Indicators:  25  H and P:  \"No IV fluids for DOYLE 2/2 history of CHF\"    5/3/25 Cardiology:  \"Echocardiogram 2025: Ejection fraction 50 to 55 percent\"    Treatment:  25:  Lasix 20 mg  25:  ECG  25:  ECG    Risk Factors:  Hypertension, CHF. AFib  Options provided:  -- Chronic Diastolic Congestive Heart Failure  -- Other - I will add my own diagnosis  -- Refer to Clinical Documentation Reviewer    Query created by: Praveena Deng on 2025 11:38 AM      Electronically signed by:  MAURA EWING MD 2025 5:31 PM          "

## 2025-05-05 NOTE — PROGRESS NOTES
[facility] was notified that Pt is requesting healthcare POA paperwork. [facility] delivered and let pt know that he would need two witnesses for the signatures. No family present at the time. Pt understood.    ERICA TOMPKINS

## 2025-05-05 NOTE — PROGRESS NOTES
Patrick Vázquez is a 89 y.o. male on day 4 of admission presenting with Urinary retention.      Subjective   Patient fully evaluated  05/02  for    Problem List Items Addressed This Visit          Genitourinary and Reproductive    * (Principal) Urinary retention - Primary    Relevant Medications    cephalexin (Keflex) 500 mg capsule    Other Relevant Orders    Referral to Urology    Hematuria    Relevant Orders    Case Request Operating Room: CYSTOSCOPY, WITH BLADDER LESION ABLATION (Completed)     Patient seen resting in bed with head of bed elevated, no s/s or c/o acute difficulties at this time.  Vital signs for last 24 hours Temp:  [35.9 °C (96.6 °F)-37.4 °C (99.3 °F)] 36.6 °C (97.9 °F)  Heart Rate:  [51-90] 64  Resp:  [16-20] 20  BP: (102-130)/(58-71) 130/69  FiO2 (%):  [21 %] 21 %    I/O this shift:  In: -   Out: 1600 [Urine:1600]  Patient still requiring frequent cardiac and SPO2 monitoring. Continue aggressive pulmonary hygiene and oral hygiene. Off loading as tolerated for skin integrity. Medications and labs reviewed-   Results for orders placed or performed during the hospital encounter of 04/30/25 (from the past 24 hours)   Comprehensive Metabolic Panel   Result Value Ref Range    Glucose 100 (H) 74 - 99 mg/dL    Sodium 136 136 - 145 mmol/L    Potassium 4.8 3.5 - 5.3 mmol/L    Chloride 104 98 - 107 mmol/L    Bicarbonate 25 21 - 32 mmol/L    Anion Gap 12 10 - 20 mmol/L    Urea Nitrogen 26 (H) 6 - 23 mg/dL    Creatinine 1.18 0.50 - 1.30 mg/dL    eGFR 59 (L) >60 mL/min/1.73m*2    Calcium 9.0 8.6 - 10.3 mg/dL    Albumin 3.6 3.4 - 5.0 g/dL    Alkaline Phosphatase 94 33 - 136 U/L    Total Protein 5.8 (L) 6.4 - 8.2 g/dL    AST 28 9 - 39 U/L    Bilirubin, Total 1.0 0.0 - 1.2 mg/dL    ALT 19 10 - 52 U/L   CBC and Auto Differential   Result Value Ref Range    WBC 6.0 4.4 - 11.3 x10*3/uL    nRBC 0.0 0.0 - 0.0 /100 WBCs    RBC 2.95 (L) 4.50 - 5.90 x10*6/uL    Hemoglobin 9.8 (L) 13.5 - 17.5 g/dL    Hematocrit 30.1 (L)  41.0 - 52.0 %     (H) 80 - 100 fL    MCH 33.2 26.0 - 34.0 pg    MCHC 32.6 32.0 - 36.0 g/dL    RDW 13.2 11.5 - 14.5 %    Platelets 188 150 - 450 x10*3/uL    Neutrophils % 57.1 40.0 - 80.0 %    Immature Granulocytes %, Automated 0.2 0.0 - 0.9 %    Lymphocytes % 28.8 13.0 - 44.0 %    Monocytes % 9.8 2.0 - 10.0 %    Eosinophils % 3.1 0.0 - 6.0 %    Basophils % 1.0 0.0 - 2.0 %    Neutrophils Absolute 3.45 1.60 - 5.50 x10*3/uL    Immature Granulocytes Absolute, Automated 0.01 0.00 - 0.50 x10*3/uL    Lymphocytes Absolute 1.74 0.80 - 3.00 x10*3/uL    Monocytes Absolute 0.59 0.05 - 0.80 x10*3/uL    Eosinophils Absolute 0.19 0.00 - 0.40 x10*3/uL    Basophils Absolute 0.06 0.00 - 0.10 x10*3/uL      Patient recently received an antibiotic (last 12 hours)       None           Plan discussed with interdisciplinary team, paz with CBI to continue, hematuria still present, no clots, titrate to clear pink per urology, appreciate input. Patient hemoglobin slight drop overnight @ 10.2 from 11.0, creatinine improved @ 1.6, will continue to trend, continue current and repeat labs in the AM.     Discharge planning discussed with patient and care team. Therapy evaluations ordered. Anticipate HHC/SNF at discharge. Patient aware and agreeable to current plan, continue plan as above.     I spent a total of 60 minutes on the date of the service which included preparing to see the patient, face-to-face patient care, completing clinical documentation, obtaining and/or reviewing separately obtained history, performing a medically appropriate examination, counseling and educating the patient/family/caregiver, ordering medications, tests, or procedures, communicating with other HCPs (not separately reported), independently interpreting results (not separately reported), communicating results to the patient/family/caregiver, and care coordination (not separately reported).        Objective     Last Recorded Vitals  /69 (BP Location:  Right arm, Patient Position: Lying)   Pulse 64   Temp 36.6 °C (97.9 °F) (Temporal)   Resp 20   Wt 68 kg (150 lb)   SpO2 97%   Intake/Output last 3 Shifts:    Intake/Output Summary (Last 24 hours) at 5/5/2025 1041  Last data filed at 5/5/2025 0700  Gross per 24 hour   Intake 150 ml   Output 9350 ml   Net -9200 ml       Admission Weight  Weight: 70.3 kg (155 lb) (04/30/25 2024)    Daily Weight  05/03/25 : 68 kg (150 lb)    Image Results  CT abdomen pelvis wo IV contrast  Narrative: Interpreted By:  Raheem Cummins,   STUDY:  CT ABDOMEN PELVIS WO IV CONTRAST;  5/2/2025 9:01 pm      INDICATION:  Signs/Symptoms:hematuria.          COMPARISON:  CT abdomen and pelvis without contrast 30 January 2025      ACCESSION NUMBER(S):  [id number]      ORDERING CLINICIAN:  MAURA EWING      TECHNIQUE:  CT of the abdomen and pelvis from the lung bases through the  symphysis pubis without oral or IV contrast      FINDINGS:  LOWER CHEST: Small free-flowing bilateral pleural effusions both new  from 30 January 2025. Unchanged cardiomegaly      BONES: No acute skeletal findings.      RIGHT KIDNEY AND URETER:  Radiodense stone: Two unchanged less than 3 mm (too small to  accurately measure attenuation) nonobstructing renal stones, one at  each pole. No new stone. None in the ureter noting the distal most  several cm of the ureter are completely obscured by streak artifact  from the hip prostheses Hydronephrosis: Negative  Congenital variant anatomy: Negative. No duplicated ureter or other  variant anatomy. Other: Large but simple parapelvic cyst or cysts  unchanged      LEFT KIDNEY AND URETER:  Radiodense stone: None, noting distal ureter obscured by streak  artifact, but the left kidney had no stone on last CT, still does not  Other: Decompressed by well-positioned Zarco catheter      URINARY BLADDER:  Radiodense stone: Negative  Wall thickening / other inflammatory change: Negative  Diverticula: Negative  Congenital variant anatomy:  Negative. No variant anatomy such as  urachal remnant. Other: n/a      LIVER: Normal. No enlargement or evidence of cirrhosis or fatty  change. No gross evidence of a mass, noting low sensitivity and  specificity without IV contrast.      SPLEEN: Normal. No enlargement.      PANCREAS: Normal. No CT evidence of acute or chronic pancreatitis. No  obvious  duct dilation. No gross evidence of a mass, noting low  sensitivity and specificity without IV contrast.      GALLBLADDER: Normal CT appearance. No dilation, calcified, or  gas-containing stones.  Other types of gallstones could be occult on  CT and detectable only by ultrasound.      BILE DUCTS: Normal. No biliary duct dilation.      ADRENAL GLANDS: Unchanged symmetric mild thickening      LYMPH NODES: No adenopathy, intraperitoneal, retroperitoneal, pelvic,  inguinal or otherwise.      APPENDIX: Normal.  Not dilated, thick walled or in any other way  inflamed in appearance.  No inflammatory change about the appendix.      COLON: Normal. No sign of acute diverticulitis or other colitis. No  annular constricting mass.      SMALL BOWEL: Normal. No small bowel dilation or any other sign of  small bowel obstruction.      STOMACH / DUODENUM: Grossly normal by CT which has limited  sensitivity and specificity for the stomach and duodenum.      RETROPERITONEUM: Normal.  No acute hemorrhage or inflammatory change.  Lymph nodes in a separate dedicated section.      OMENTUM, MESENTERY AND PERITONEAL SPACES:  Free intraperitoneal air: Negative  Free intraperitoneal fluid: Negative  Abscess: Negative  Other: Mild diffuse edema      PELVIS: Lower pelvis obscured by streak artifact from bilateral hip  prostheses      VASCULATURE: Aortic and iliac atherosclerotic calcifications without  aneurysm or other acute finding.      ABDOMINAL WALL:  Hernia: Negative  Other: Subcutaneous edema      Impression: No acute new process in the urinary tract or anywhere else in the  abdomen or  "pelvis when compared to 30 January 2025      Same two tiny nonobstructing right renal stones, one at each pole      No new stone anywhere in the urinary tract      No hydroureteronephrosis on either side, only a large but simple  parapelvic cysts at the right renal hilus, a common artifactual mimic  for hydronephrosis      Urinary bladder completely decompressed by well-positioned Zarco  catheter      Diffuse at least mild edema (subcutaneous, mesenteric, and trace deep  pelvic free fluid)      New small bilateral pleural effusions      MACRO:  None      Signed by: Raheem Cummins 5/3/2025 8:17 AM  Dictation workstation:   [id number]      Physical Exam  Vitals and nursing note reviewed.     onstitutional:       Appearance: Normal appearance.   HENT:      Mouth/Throat:      Mouth: Mucous membranes are dry.      Pharynx: Oropharynx is clear.   Eyes:      Pupils: Pupils are equal, round, and reactive to light.   Cardiovascular:      Rate and Rhythm: Normal rate and regular rhythm.   Pulmonary:      Effort: Pulmonary effort is normal.      Breath sounds: Normal breath sounds.   Abdominal:      General: Bowel sounds are normal.      Palpations: Abdomen is soft.   Genitourinary:     Comments: Zarco catheter  Musculoskeletal:         General: Normal range of motion.      Cervical back: Normal range of motion.      Comments: \"Sock line\" pitting edema B/L ankles   Skin:     General: Skin is warm and dry.      Capillary Refill: Capillary refill takes less than 2 seconds.   Neurological:      General: No focal deficit present.      Mental Status: He is alert and oriented to person, place, and time.     Relevant Results                      This patient has a urinary catheter   Reason for the urinary catheter remaining today? urinary retention/bladder outlet obstruction, acute or chronic          Assessment & Plan  Urinary retention    DOYLE (acute kidney injury)    Hematuria    Anemia                   Gordo Stevens MD "   Physician  Internal Medicine     H&P     Signed     Date of Service: 5/1/2025 12:22 PM     Signed       Expand All Collapse All    History Of Present Illness  Patrick Vázquez is a 89 y.o. male with past medical history significant for prostate cancer, hypertension, heart failure, COPD, diverticulosis, NSTEMI (2022) with stent placement, mitral regurgitation s/p MitraClip presenting to emergency department for evaluation of urinary retention.  Patient states that his urologist placed a Zarco catheter approximately 1 week ago due to urinary retention.  He states that his Zarco catheter was removed around noon and since then he had been unable to urinate.  Patient states he developed lower abdominal pain.  Patient denies nausea, vomiting, diarrhea, recent illness.  Patient denies chest pain or dizziness.  Patient states he is currently anticoagulated on Plavix, baby aspirin, and Eliquis due to a mixup in his medications.  Patient was removed from his Plavix on 4/7/2025 by his cardiologist Dr. Shin, however the patient states that he never actually stopped taking the Plavix.  He called the cardiologist office on 4/25/2025 when his urologist questioned why he was on Plavix and Eliquis.  Patient stated at that time he would stop taking the Plavix however he now states that he is confused and is unsure if he actually stopped taking it.     In ED, a bladder scan was completed and revealed 440 mL of urine.  A Zarco catheter was inserted and the patient was initially going to be discharged home on Keflex prophylactically to follow-up with his urologist.  Upon discharging the patient he developed jeremiah hematuria which clogged his Zarco catheter.  Patient is currently requiring continuous bladder irrigation.  Basic labs obtained and ordered by me.  PT 17.2, INR 1.6, hemoglobin 11.0, hematocrit 33.4, platelets 138, glucose 106, BUN 43, creatinine 1.45, GFR 46.  Urinalysis showing dark brown turbid urine with 3+ blood and  75 leukocytes, no WBCs.  Consult placed to urology.  Blood pressure currently 119/56, heart rate 66, respirations 18, temperature 36.6 °C, SpO2 96% on room air.  Patient is a DNR/DNI.  Has pitting edema on his bilateral lower ankles, history of heart failure.  No IV fluids given.  Echocardiogram 3/6/2025 with an EF of 50 to 55%, mild AVR.  Patient will be admitted to telemetry under the care of Dr. Stevens who will continue to follow.  I was asked to do H&P and place initial admission orders.  PCP Dr. Sean Godwin.     Prior medical records reviewed by me.     Past Medical History  Prostate cancer, hypertension, CHF, cataract, COPD, diverticulosis, DJD, NSTEMI 2022, mitral regurgitation s/p MitraClip  Surgical History  MitraClip, hip replacement, prostatectomy, cardiac stent placement, cardiac catheterization     Social History  Former smoker, no drug use, social alcohol use, , lives at home with his wife  Family History  Reviewed and noncontributory     Allergies  Bee venom protein (honey bee)     Review of Systems   HENT:  Positive for congestion.    Respiratory:  Positive for shortness of breath.    Cardiovascular:  Positive for leg swelling.   Genitourinary:  Positive for decreased urine volume and difficulty urinating.   Neurological:  Positive for weakness.   All other systems reviewed and are negative.     A 10 point review of systems was completed and is negative except what is listed in HPI  Physical Exam  Constitutional:       Appearance: Normal appearance.   HENT:      Mouth/Throat:      Mouth: Mucous membranes are dry.      Pharynx: Oropharynx is clear.   Eyes:      Pupils: Pupils are equal, round, and reactive to light.   Cardiovascular:      Rate and Rhythm: Normal rate and regular rhythm.   Pulmonary:      Effort: Pulmonary effort is normal.      Breath sounds: Normal breath sounds.   Abdominal:      General: Bowel sounds are normal.      Palpations: Abdomen is soft.   Genitourinary:      "Comments: Zarco catheter  Musculoskeletal:         General: Normal range of motion.      Cervical back: Normal range of motion.      Comments: \"Sock line\" pitting edema B/L ankles   Skin:     General: Skin is warm and dry.      Capillary Refill: Capillary refill takes less than 2 seconds.   Neurological:      General: No focal deficit present.      Mental Status: He is alert and oriented to person, place, and time.            Last Recorded Vitals  Blood pressure 98/51, pulse 68, temperature 36.6 °C (97.9 °F), resp. rate (!) 22, weight 70.3 kg (155 lb), SpO2 93%.     Relevant Results  No results found.        Results for orders placed or performed during the hospital encounter of 04/30/25 (from the past 24 hours)   Urinalysis with Reflex Culture and Microscopic   Result Value Ref Range     Color, Urine Dark-Brown (N) Light-Yellow, Yellow, Dark-Yellow     Appearance, Urine Ex.Turbid (N) Clear     Specific Gravity, Urine 1.019 1.005 - 1.035     pH, Urine 7.0 5.0, 5.5, 6.0, 6.5, 7.0, 7.5, 8.0     Protein, Urine 300 (3+) (A) NEGATIVE, 10 (TRACE), 20 (TRACE) mg/dL     Glucose, Urine Normal Normal mg/dL     Blood, Urine OVER (3+) (A) NEGATIVE mg/dL     Ketones, Urine NEGATIVE NEGATIVE mg/dL     Bilirubin, Urine NEGATIVE NEGATIVE mg/dL     Urobilinogen, Urine Normal Normal mg/dL     Nitrite, Urine NEGATIVE NEGATIVE     Leukocyte Esterase, Urine 75 Connie/uL (A) NEGATIVE   Extra Urine Gray Tube   Result Value Ref Range     Extra Tube Hold for add-ons.     Microscopic Only, Urine   Result Value Ref Range     WBC, Urine 1-5 1-5, NONE /HPF     RBC, Urine >20 (A) NONE, 1-2, 3-5 /HPF   Coagulation Screen   Result Value Ref Range     Protime 17.2 (H) 9.8 - 12.4 seconds     INR 1.6 (H) 0.9 - 1.1     aPTT 29 26 - 36 seconds   CBC   Result Value Ref Range     WBC 6.3 4.4 - 11.3 x10*3/uL     nRBC 0.0 0.0 - 0.0 /100 WBCs     RBC 3.28 (L) 4.50 - 5.90 x10*6/uL     Hemoglobin 11.0 (L) 13.5 - 17.5 g/dL     Hematocrit 33.4 (L) 41.0 - 52.0 %    "   (H) 80 - 100 fL     MCH 33.5 26.0 - 34.0 pg     MCHC 32.9 32.0 - 36.0 g/dL     RDW 13.7 11.5 - 14.5 %     Platelets 138 (L) 150 - 450 x10*3/uL   Basic metabolic panel   Result Value Ref Range     Glucose 106 (H) 74 - 99 mg/dL     Sodium 137 136 - 145 mmol/L     Potassium 5.2 3.5 - 5.3 mmol/L     Chloride 104 98 - 107 mmol/L     Bicarbonate 24 21 - 32 mmol/L     Anion Gap 14 10 - 20 mmol/L     Urea Nitrogen 43 (H) 6 - 23 mg/dL     Creatinine 1.45 (H) 0.50 - 1.30 mg/dL     eGFR 46 (L) >60 mL/min/1.73m*2     Calcium 9.6 8.6 - 10.3 mg/dL         Assessment & Plan  Urinary retention     DOYLE (acute kidney injury)     Hematuria     Anemia        Patrick is a 89-year-old male patient presented to emergency department for evaluation of urinary retention.  Patient with a history of prostate cancer states he had a Zarco catheter placed 1 week ago and was removed at approximately noon today.  Patient states that since then he has been unable to urinate and developed lower abdominal pain.  Patient with a bladder scan of 440 in ED.  Had a Zarco catheter placed and was initially going to be discharged home on prophylactic Keflex with follow-up to his urologist.  Patient developed jeremiah hematuria which clogged his Zarco catheter requiring continuous bladder irrigation.  Patient also has a medication mixup in which she has been taking Plavix, Eliquis, and baby aspirin daily.  Per medical records patient was removed from his Plavix on 4/7/2025 by his cardiologist.  There was a telephone conversation in which this was cleared up again on 4/25/2025 and the patient states he would take that Plavix out of his pillbox but he is unsure whether he did.  Consult placed to urology.  Labs obtained, patient found to have an DOYLE and anemia.  Vital signs within normal limits.  Patient admitted for further medical management.     Urinary retention/DOYLE/hematuria/anemia  Inpatient telemetry admission to Dr. Hilda Zarco/continuous bladder  irrigation  Consult urology and appreciate input  Hold home anticoagulation including Eliquis, baby aspirin  Patient requiring medication education  Trend hemoglobin  No IV fluids for DOYLE 2/2 history of CHF  Hold home furosemide/Aldactone 2/2 DOYLE  Sock pitting edema noted to bilateral lower extremities  Cardiac/low-sodium diet  Tylenol as needed     Prostate cancer/HTN/CHF/COPD/diverticulosis/CAD/DJD  #Chronic conditions  Continue home atorvastatin 40 mg daily  Continue home inhalers  Hold home Eliquis, baby aspirin, Lasix, Aldactone  Attending to restart medications as appropriate  Echocardiogram on file 3/6/2025: EF 50 to 55%, mild AVR     DVT Ppx  SCDs  No chemical prophylaxis 2/2 hematuria  Up to chair with assistance             Chief Complaint   Patient presents with    Urinary Retention       Had paz removed this am, has not urinated since.         This is a 89 years old male patient presented to the emergency department with a chief complaint of urinary retention.  Stated that today around noon he removed his Paz catheter and he could not pass urine since then.  Denies any flank pain, abdominal pain, nausea, vomiting, fever, chills, body aches, chest pain.     Review of system: As stated above in the HPI section.     had concerns including Urinary Retention (Had paz removed this am, has not urinated since.).        Problem List Items Addressed This Visit         * (Principal) Urinary retention - Primary     Relevant Medications     cephalexin (Keflex) 500 mg capsule     Other Relevant Orders     Referral to Urology     Hematuria         HPI         Urinary Retention     Additional comments: Had paz removed this am, has not urinated since.            Last edited by Javid Moya RN on 4/30/2025  8:26 PM.             Problem List as of 5/1/2025 Reviewed: 12/30/2024 11:23 PM by BONNIE Cristobal-CNP        Acquired spondylolisthesis of lumbosacral region     COPD (chronic obstructive pulmonary disease)  (Multi)     Arthritis of left hip     Coronary artery disease     Diverticulosis     DJD (degenerative joint disease)     Dyslipidemia     HTN (hypertension)     Mitral valve insufficiency     Prostate CA (Multi)     S/P mitral valve clip implantation     Dyspnea on exertion     Cataract, nuclear sclerotic, both eyes     Contusion of hip     Eye strain, bilateral     History of malignant neoplasm of skin     History of repair of hip joint     Hyperopia of both eyes with regular astigmatism     Regular astigmatism of both eyes with presbyopia     Left hip pain     Low back pain     CHF (congestive heart failure), NYHA class II, acute on chronic, diastolic     * (Principal) Urinary retention     DOYLE (acute kidney injury)     Hematuria     Anemia              Active Ambulatory Problems     Diagnosis Date Noted    Acquired spondylolisthesis of lumbosacral region 02/20/2024    COPD (chronic obstructive pulmonary disease) (Multi) 02/20/2024    Arthritis of left hip 02/20/2024    Coronary artery disease 02/20/2024    Diverticulosis 02/20/2024    DJD (degenerative joint disease) 02/20/2024    Dyslipidemia 02/20/2024    HTN (hypertension) 02/20/2024    Mitral valve insufficiency 02/20/2024    Prostate CA (Multi) 02/20/2024    S/P mitral valve clip implantation 02/20/2024    Dyspnea on exertion 02/20/2024    Cataract, nuclear sclerotic, both eyes 05/02/2023    Contusion of hip 03/01/2024    Eye strain, bilateral 03/01/2024    History of malignant neoplasm of skin 03/01/2024    History of repair of hip joint 03/01/2024    Hyperopia of both eyes with regular astigmatism 03/01/2024    Regular astigmatism of both eyes with presbyopia 03/01/2024    Left hip pain 03/01/2024    Low back pain 03/01/2024    CHF (congestive heart failure), NYHA class II, acute on chronic, diastolic 11/01/2024           Resolved Ambulatory Problems     Diagnosis Date Noted    Congestive heart failure 03/01/2024    Hyperlipoproteinemia 02/20/2024            Past Medical History:   Diagnosis Date    Cataract      CHF (congestive heart failure)      Hypertension      Personal history of malignant neoplasm of prostate      Personal history of other malignant neoplasm of skin                      Active Orders   Procedures     Irrigation of Bladder       Frequency: Once       Number of Occurrences: 1 Occurrences       Order Comments: Please proceed with continuous bladder irrigation.  Thank you      Diet     Adult diet Cardiac; 70 gm fat; 2 - 3 grams Sodium       Frequency: Effective now       Number of Occurrences: Until Specified   Nursing     Activity (specify) Activity With Exceptions; Up in chair       Frequency: Until discontinued       Number of Occurrences: Until Specified     Apply sequential compression device       Frequency: Until discontinued       Number of Occurrences: Until Specified     Height on admission       Frequency: Once       Number of Occurrences: 1 Occurrences     Maintain Urethral Catheter with Nurse Driven Indwelling Urethral Catheter Removal Protocol       Frequency: Until discontinued       Number of Occurrences: Until Specified     Maintain Urethral Catheter with Nurse Driven Indwelling Urethral Catheter Removal Protocol       Frequency: Until discontinued       Number of Occurrences: Until Specified     No Isolation Required       Frequency: Once       Number of Occurrences: 1 Occurrences     Notify provider       Frequency: Until discontinued       Number of Occurrences: Until Specified     Nursing Guidelines: Paz Cath       Frequency: Until discontinued       Number of Occurrences: Until Specified       Order Comments: 1. Perform a Post Voiding Residual (PVR) using the Bladder Volume Control Indicator (BVI) the first two voidings after a paz catheter is removed.  If the PVR is greater than 450cc perform straight cath.  If the patient requires a second straight cath procedure notify physician on the next two rounds.  2. If the  patient has not voided after 8 hours check volume with the BVI and straight cath if over 450cc.  3. If no voiding at discharge reinsert indwelling urinary catheter and recommend referral to Urology Clinic at discharge.   4. Notify physician of indwelling urinary catheter replacement        Pain Assessment       Frequency: Per unit standards       Number of Occurrences: Until Specified     Physician Communication       Frequency: Until discontinued       Number of Occurrences: Until Specified       Order Comments: IF patient requires a second straight cath procedure after paz removed, notify physician on the next rounds        Vital Signs       Frequency: q4h       Number of Occurrences: Until Specified     Weight on admission       Frequency: Once       Number of Occurrences: 1 Occurrences   Consult     Inpatient consult to Social Work and TCC       Frequency: Once       Number of Occurrences: 1 Occurrences     Inpatient consult to Urology       Frequency: Once       Number of Occurrences: 1 Occurrences   Nourishments     May Participate in Room Service       Frequency: Once       Number of Occurrences: 1 Occurrences   Respiratory Care     Respiratory care eval and treat       Frequency: Once       Number of Occurrences: 1 Occurrences   ECG     ECG 12 lead       Frequency: Once       Number of Occurrences: 1 Occurrences     Electrocardiogram, 12-lead PRN ACS symptoms       Frequency: PRN       Number of Occurrences: Until Specified       Order Comments: Notify provider if performed.      Telemetry     Telemetry monitoring - Floor only       Frequency: Until discontinued       Number of Occurrences: 3 Days   Medications     acetaminophen (Tylenol) tablet 650 mg       Frequency: q4h PRN       Dose: 650 mg       Route: oral     albuterol 90 mcg/actuation inhaler 2 puff       Frequency: q6h PRN       Dose: 2 puff       Route: inhalation     atorvastatin (Lipitor) tablet 40 mg       Frequency: Daily       Dose: 40 mg        Route: oral     fluticasone furoate-vilanteroL (Breo Ellipta) 200-25 mcg/dose inhaler 1 puff       Linked Order: And       Frequency: Daily       Dose: 1 puff       Route: inhalation     furosemide (Lasix) tablet 20 mg       Frequency: Every other day       Dose: 20 mg       Route: oral     spironolactone (Aldactone) tablet 12.5 mg       Frequency: Daily       Dose: 12.5 mg       Route: oral     tiotropium (Spiriva Respimat) 2.5 mcg/actuation inhaler 2 puff       Linked Order: And       Frequency: Daily       Dose: 2 puff       Route: inhalation      Dietary Orders (From admission, onward)          Start     Ordered     05/01/25 0213   May Participate in Room Service  ( ROOM SERVICE MAY PARTICIPATE)  Once        Question:  .  Answer:  Yes    05/01/25 0212 05/01/25 0012   Adult diet Cardiac; 70 gm fat; 2 - 3 grams Sodium  Diet effective now        Question Answer Comment   Diet type Cardiac     Fat restriction: 70 gm fat     Sodium restriction: 2 - 3 grams Sodium         05/01/25 0011                       89 yrs@  ADMITDTTM@  Urinary retention [R33.9]  Hematuria, unspecified type [R31.9]  [unfilled]  Weight: 70.3 kg (155 lb)     Vitals          Vitals:     04/30/25 2024 04/30/25 2100 04/30/25 2200 05/01/25 0209   BP: 119/56 116/64   107/67   Pulse: 83 56 66 58   Resp: (!) 22         Temp: 36.6 °C (97.9 °F)     36.5 °C (97.7 °F)   SpO2: 99% 100% 98% 98%   Weight: 70.3 kg (155 lb)           05/01/25 0351 05/01/25 0800 05/01/25 1129   BP: 109/67 112/61 98/51   Pulse: 57 60 68   Resp:         Temp: 36.3 °C (97.3 °F) 36.4 °C (97.5 °F) 36.6 °C (97.9 °F)   SpO2: 97% (!) 89% 93%   Weight:                        Chief Complaint    Urinary Retention            Patient seen resting in bed with head of bed elevated, no s/s or c/o acute difficulties at this time.  Vital signs for last 24 hours Temp:  [36.3 °C (97.3 °F)-36.6 °C (97.9 °F)] 36.6 °C (97.9 °F)  Heart Rate:  [56-83] 68  Resp:  [22] 22  BP: ()/(51-67)  98/51    I/O this shift:  In: -   Out: 600 [Urine:600]  Patient still requiring frequent cardiac and SPO2 monitoring. Continue aggressive pulmonary hygiene and oral hygiene. Off loading as tolerated for skin integrity. Medications and labs reviewed-    Patient recently received an antibiotic (last 12 hours)         None                    Results for orders placed or performed during the hospital encounter of 04/30/25 (from the past 96 hours)   Urinalysis with Reflex Culture and Microscopic   Result Value Ref Range     Color, Urine Dark-Brown (N) Light-Yellow, Yellow, Dark-Yellow     Appearance, Urine Ex.Turbid (N) Clear     Specific Gravity, Urine 1.019 1.005 - 1.035     pH, Urine 7.0 5.0, 5.5, 6.0, 6.5, 7.0, 7.5, 8.0     Protein, Urine 300 (3+) (A) NEGATIVE, 10 (TRACE), 20 (TRACE) mg/dL     Glucose, Urine Normal Normal mg/dL     Blood, Urine OVER (3+) (A) NEGATIVE mg/dL     Ketones, Urine NEGATIVE NEGATIVE mg/dL     Bilirubin, Urine NEGATIVE NEGATIVE mg/dL     Urobilinogen, Urine Normal Normal mg/dL     Nitrite, Urine NEGATIVE NEGATIVE     Leukocyte Esterase, Urine 75 Connie/uL (A) NEGATIVE   Extra Urine Gray Tube   Result Value Ref Range     Extra Tube Hold for add-ons.     Microscopic Only, Urine   Result Value Ref Range     WBC, Urine 1-5 1-5, NONE /HPF     RBC, Urine >20 (A) NONE, 1-2, 3-5 /HPF   Coagulation Screen   Result Value Ref Range     Protime 17.2 (H) 9.8 - 12.4 seconds     INR 1.6 (H) 0.9 - 1.1     aPTT 29 26 - 36 seconds   CBC   Result Value Ref Range     WBC 6.3 4.4 - 11.3 x10*3/uL     nRBC 0.0 0.0 - 0.0 /100 WBCs     RBC 3.28 (L) 4.50 - 5.90 x10*6/uL     Hemoglobin 11.0 (L) 13.5 - 17.5 g/dL     Hematocrit 33.4 (L) 41.0 - 52.0 %      (H) 80 - 100 fL     MCH 33.5 26.0 - 34.0 pg     MCHC 32.9 32.0 - 36.0 g/dL     RDW 13.7 11.5 - 14.5 %     Platelets 138 (L) 150 - 450 x10*3/uL   Basic metabolic panel   Result Value Ref Range     Glucose 106 (H) 74 - 99 mg/dL     Sodium 137 136 - 145 mmol/L      Potassium 5.2 3.5 - 5.3 mmol/L     Chloride 104 98 - 107 mmol/L     Bicarbonate 24 21 - 32 mmol/L     Anion Gap 14 10 - 20 mmol/L     Urea Nitrogen 43 (H) 6 - 23 mg/dL     Creatinine 1.45 (H) 0.50 - 1.30 mg/dL     eGFR 46 (L) >60 mL/min/1.73m*2     Calcium 9.6 8.6 - 10.3 mg/dL      Patient fully evaluated 05/01, thorough record review performed of previous labs and notes from prior encounters. Plan discussed with interdisciplinary team, consults placed, appreciate input. Will continue current and repeat labs in the AM.       Discharge planning discussed with patient and care team. Therapy evaluations ordered. Patient aware and agreeable to current plan, continue plan as above.      I spent a total of 75 minutes on the date of the service which included preparing to see the patient, face-to-face patient care, completing clinical documentation, obtaining and/or reviewing separately obtained history, performing a medically appropriate examination, counseling and educating the patient/family/caregiver, ordering medications, tests, or procedures, communicating with other HCPs (not separately reported), independently interpreting results (not separately reported), communicating results to the patient/family/caregiver, and care coordination (not separately reported).            Sangeetha Dumont                   Revision History        Patient fully evaluated on May 3.  H&H are stable.  Still with significant mobility issues.  Recheck labs in AM.  To have recheck labs in AM.  Still having hematuria.  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  Continue present IV antibiotics for contaminated urine.    Patient fully evaluated  05/04  for    Problem List Items Addressed This Visit          Genitourinary and Reproductive    * (Principal) Urinary retention - Primary    Relevant Medications    cephalexin (Keflex) 500 mg capsule    Other Relevant Orders    Referral to Urology    Hematuria    Relevant Orders    Case Request Operating Room:  CYSTOSCOPY, WITH BLADDER LESION ABLATION (Completed)     Patient seen resting in bed with head of bed elevated, no s/s or c/o acute difficulties at this time.  Vital signs for last 24 hours Temp:  [35.9 °C (96.6 °F)-37.4 °C (99.3 °F)] 36.6 °C (97.9 °F)  Heart Rate:  [51-90] 64  Resp:  [16-20] 20  BP: (102-130)/(58-71) 130/69  FiO2 (%):  [21 %] 21 %    I/O this shift:  In: -   Out: 1600 [Urine:1600]  Patient still requiring frequent cardiac and SPO2 monitoring. Continue aggressive pulmonary hygiene and oral hygiene. Off loading as tolerated for skin integrity. Medications and labs reviewed-   Results for orders placed or performed during the hospital encounter of 04/30/25 (from the past 24 hours)   Comprehensive Metabolic Panel   Result Value Ref Range    Glucose 100 (H) 74 - 99 mg/dL    Sodium 136 136 - 145 mmol/L    Potassium 4.8 3.5 - 5.3 mmol/L    Chloride 104 98 - 107 mmol/L    Bicarbonate 25 21 - 32 mmol/L    Anion Gap 12 10 - 20 mmol/L    Urea Nitrogen 26 (H) 6 - 23 mg/dL    Creatinine 1.18 0.50 - 1.30 mg/dL    eGFR 59 (L) >60 mL/min/1.73m*2    Calcium 9.0 8.6 - 10.3 mg/dL    Albumin 3.6 3.4 - 5.0 g/dL    Alkaline Phosphatase 94 33 - 136 U/L    Total Protein 5.8 (L) 6.4 - 8.2 g/dL    AST 28 9 - 39 U/L    Bilirubin, Total 1.0 0.0 - 1.2 mg/dL    ALT 19 10 - 52 U/L   CBC and Auto Differential   Result Value Ref Range    WBC 6.0 4.4 - 11.3 x10*3/uL    nRBC 0.0 0.0 - 0.0 /100 WBCs    RBC 2.95 (L) 4.50 - 5.90 x10*6/uL    Hemoglobin 9.8 (L) 13.5 - 17.5 g/dL    Hematocrit 30.1 (L) 41.0 - 52.0 %     (H) 80 - 100 fL    MCH 33.2 26.0 - 34.0 pg    MCHC 32.6 32.0 - 36.0 g/dL    RDW 13.2 11.5 - 14.5 %    Platelets 188 150 - 450 x10*3/uL    Neutrophils % 57.1 40.0 - 80.0 %    Immature Granulocytes %, Automated 0.2 0.0 - 0.9 %    Lymphocytes % 28.8 13.0 - 44.0 %    Monocytes % 9.8 2.0 - 10.0 %    Eosinophils % 3.1 0.0 - 6.0 %    Basophils % 1.0 0.0 - 2.0 %    Neutrophils Absolute 3.45 1.60 - 5.50 x10*3/uL    Immature  Granulocytes Absolute, Automated 0.01 0.00 - 0.50 x10*3/uL    Lymphocytes Absolute 1.74 0.80 - 3.00 x10*3/uL    Monocytes Absolute 0.59 0.05 - 0.80 x10*3/uL    Eosinophils Absolute 0.19 0.00 - 0.40 x10*3/uL    Basophils Absolute 0.06 0.00 - 0.10 x10*3/uL      Patient recently received an antibiotic (last 12 hours)       None           Plan discussed with interdisciplinary team, patient continues with ambulatory dysfunction and significant mobility issues. Hematuria continues, will monitor H&H, strict intake and output. 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  Continue present IV antibiotics, continue current and repeat labs in the AM.     Discharge planning discussed with patient and care team. Therapy evaluations ordered. Anticipate HHC/SNF at discharge. Patient aware and agreeable to current plan, continue plan as above.     I spent a total of 60 minutes on the date of the service which included preparing to see the patient, face-to-face patient care, completing clinical documentation, obtaining and/or reviewing separately obtained history, performing a medically appropriate examination, counseling and educating the patient/family/caregiver, ordering medications, tests, or procedures, communicating with other HCPs (not separately reported), independently interpreting results (not separately reported), communicating results to the patient/family/caregiver, and care coordination (not separately reported).     Patient fully evaluated  5/5  for    Problem List Items Addressed This Visit          Genitourinary and Reproductive    * (Principal) Urinary retention - Primary    Relevant Medications    cephalexin (Keflex) 500 mg capsule    Other Relevant Orders    Referral to Urology    Hematuria    Relevant Orders    Case Request Operating Room: CYSTOSCOPY, WITH BLADDER LESION ABLATION (Completed)     Patient seen resting in bed with head of bed elevated, no s/s or c/o acute difficulties at this time.  Vital signs for last 24  hours Temp:  [35.9 °C (96.6 °F)-37.4 °C (99.3 °F)] 36.6 °C (97.9 °F)  Heart Rate:  [51-90] 64  Resp:  [16-20] 20  BP: (102-130)/(58-71) 130/69  FiO2 (%):  [21 %] 21 %    I/O this shift:  In: -   Out: 1600 [Urine:1600]  Patient still requiring frequent cardiac and SPO2 monitoring. Continue aggressive pulmonary hygiene and oral hygiene. Off loading as tolerated for skin integrity. Medications and labs reviewed-   Results for orders placed or performed during the hospital encounter of 04/30/25 (from the past 24 hours)   Comprehensive Metabolic Panel   Result Value Ref Range    Glucose 100 (H) 74 - 99 mg/dL    Sodium 136 136 - 145 mmol/L    Potassium 4.8 3.5 - 5.3 mmol/L    Chloride 104 98 - 107 mmol/L    Bicarbonate 25 21 - 32 mmol/L    Anion Gap 12 10 - 20 mmol/L    Urea Nitrogen 26 (H) 6 - 23 mg/dL    Creatinine 1.18 0.50 - 1.30 mg/dL    eGFR 59 (L) >60 mL/min/1.73m*2    Calcium 9.0 8.6 - 10.3 mg/dL    Albumin 3.6 3.4 - 5.0 g/dL    Alkaline Phosphatase 94 33 - 136 U/L    Total Protein 5.8 (L) 6.4 - 8.2 g/dL    AST 28 9 - 39 U/L    Bilirubin, Total 1.0 0.0 - 1.2 mg/dL    ALT 19 10 - 52 U/L   CBC and Auto Differential   Result Value Ref Range    WBC 6.0 4.4 - 11.3 x10*3/uL    nRBC 0.0 0.0 - 0.0 /100 WBCs    RBC 2.95 (L) 4.50 - 5.90 x10*6/uL    Hemoglobin 9.8 (L) 13.5 - 17.5 g/dL    Hematocrit 30.1 (L) 41.0 - 52.0 %     (H) 80 - 100 fL    MCH 33.2 26.0 - 34.0 pg    MCHC 32.6 32.0 - 36.0 g/dL    RDW 13.2 11.5 - 14.5 %    Platelets 188 150 - 450 x10*3/uL    Neutrophils % 57.1 40.0 - 80.0 %    Immature Granulocytes %, Automated 0.2 0.0 - 0.9 %    Lymphocytes % 28.8 13.0 - 44.0 %    Monocytes % 9.8 2.0 - 10.0 %    Eosinophils % 3.1 0.0 - 6.0 %    Basophils % 1.0 0.0 - 2.0 %    Neutrophils Absolute 3.45 1.60 - 5.50 x10*3/uL    Immature Granulocytes Absolute, Automated 0.01 0.00 - 0.50 x10*3/uL    Lymphocytes Absolute 1.74 0.80 - 3.00 x10*3/uL    Monocytes Absolute 0.59 0.05 - 0.80 x10*3/uL    Eosinophils Absolute 0.19  0.00 - 0.40 x10*3/uL    Basophils Absolute 0.06 0.00 - 0.10 x10*3/uL      Patient recently received an antibiotic (last 12 hours)       None           Patient vitals include     Temp:  [35.9 °C (96.6 °F)-37.4 °C (99.3 °F)] 36.6 °C (97.9 °F)  Heart Rate:  [51-90] 64  Resp:  [16-20] 20  BP: (102-130)/(58-71) 130/69  FiO2 (%):  [21 %] 21 %    Patient stable, up in chair and in good spirits. Urology saw patient, patient is scheduled for cystoscopy with fulguration of bleeders tomorrow. CT abdomen showed mild edema and small bilateral pleural effusions. Will continue to monitor.   Plan discussed with interdisciplinary team, continue current and repeat labs in the AM.     Discharge planning discussed with patient and care team. Therapy evaluations ordered[facility] at [facility]. Patient aware and agreeable to current plan, continue plan as above.     I spent a total of 60 minutes on the date of the service which included preparing to see the patient, face-to-face patient care, completing clinical documentation, obtaining and/or reviewing separately obtained history, performing a medically appropriate examination, counseling and educating the patient/family/caregiver, ordering medications, tests, or procedures, communicating with other HCPs (not separately reported), independently interpreting results (not separately reported), communicating results to the patient/family/caregiver, and care coordination (not separately reported).       YASMINE ANG

## 2025-05-05 NOTE — PROGRESS NOTES
"Patrick Vázquez is a 89 y.o. male on day 4 of admission presenting with Urinary retention.    Subjective   Follow-up hematuria   Zarco is to continuous bladder irrigation on a trickle his urine is light pink at this time.  Over the weekend he had manual irrigation removing a few small clots.  Discussed with the office and we will plan for cystoscopy possible fulguration of bleeders on Tuesday with his urologist Dr. Caicedo    Objective     Physical Exam    Last Recorded Vitals  Blood pressure 130/69, pulse 64, temperature 36.6 °C (97.9 °F), temperature source Temporal, resp. rate 20, height 1.727 m (5' 8\"), weight 68 kg (150 lb), SpO2 97%.  Intake/Output last 3 Shifts:  I/O last 3 completed shifts:  In: 200 (2.8 mL/kg) [P.O.:100; IV Piggyback:100]  Out: 8250 (117.4 mL/kg) [Urine:8250 (3.3 mL/kg/hr)]  Dosing Weight: 70.3 kg     Relevant Results  Scheduled medications  Scheduled Medications[1]  Continuous medications  Continuous Medications[2]  PRN medications  PRN Medications[3]    Results for orders placed or performed during the hospital encounter of 04/30/25 (from the past 24 hours)   Comprehensive Metabolic Panel   Result Value Ref Range    Glucose 100 (H) 74 - 99 mg/dL    Sodium 136 136 - 145 mmol/L    Potassium 4.8 3.5 - 5.3 mmol/L    Chloride 104 98 - 107 mmol/L    Bicarbonate 25 21 - 32 mmol/L    Anion Gap 12 10 - 20 mmol/L    Urea Nitrogen 26 (H) 6 - 23 mg/dL    Creatinine 1.18 0.50 - 1.30 mg/dL    eGFR 59 (L) >60 mL/min/1.73m*2    Calcium 9.0 8.6 - 10.3 mg/dL    Albumin 3.6 3.4 - 5.0 g/dL    Alkaline Phosphatase 94 33 - 136 U/L    Total Protein 5.8 (L) 6.4 - 8.2 g/dL    AST 28 9 - 39 U/L    Bilirubin, Total 1.0 0.0 - 1.2 mg/dL    ALT 19 10 - 52 U/L   CBC and Auto Differential   Result Value Ref Range    WBC 6.0 4.4 - 11.3 x10*3/uL    nRBC 0.0 0.0 - 0.0 /100 WBCs    RBC 2.95 (L) 4.50 - 5.90 x10*6/uL    Hemoglobin 9.8 (L) 13.5 - 17.5 g/dL    Hematocrit 30.1 (L) 41.0 - 52.0 %     (H) 80 - 100 fL    MCH " 33.2 26.0 - 34.0 pg    MCHC 32.6 32.0 - 36.0 g/dL    RDW 13.2 11.5 - 14.5 %    Platelets 188 150 - 450 x10*3/uL    Neutrophils % 57.1 40.0 - 80.0 %    Immature Granulocytes %, Automated 0.2 0.0 - 0.9 %    Lymphocytes % 28.8 13.0 - 44.0 %    Monocytes % 9.8 2.0 - 10.0 %    Eosinophils % 3.1 0.0 - 6.0 %    Basophils % 1.0 0.0 - 2.0 %    Neutrophils Absolute 3.45 1.60 - 5.50 x10*3/uL    Immature Granulocytes Absolute, Automated 0.01 0.00 - 0.50 x10*3/uL    Lymphocytes Absolute 1.74 0.80 - 3.00 x10*3/uL    Monocytes Absolute 0.59 0.05 - 0.80 x10*3/uL    Eosinophils Absolute 0.19 0.00 - 0.40 x10*3/uL    Basophils Absolute 0.06 0.00 - 0.10 x10*3/uL       Imaging  CT abdomen pelvis wo IV contrast  Result Date: 5/3/2025  No acute new process in the urinary tract or anywhere else in the abdomen or pelvis when compared to 30 January 2025   Same two tiny nonobstructing right renal stones, one at each pole   No new stone anywhere in the urinary tract   No hydroureteronephrosis on either side, only a large but simple parapelvic cysts at the right renal hilus, a common artifactual mimic for hydronephrosis   Urinary bladder completely decompressed by well-positioned Zarco catheter   Diffuse at least mild edema (subcutaneous, mesenteric, and trace deep pelvic free fluid)   New small bilateral pleural effusions   MACRO: None   Signed by: Raheem Cummins 5/3/2025 8:17 AM Dictation workstation:   [id number]      Cardiology, Vascular, and Other Imaging  No other imaging results found for the past 7 days                             Assessment & Plan  Urinary retention    DOYLE (acute kidney injury)    Hematuria    Anemia    Follow-up hematuria  Zarco continue continuous bladder irrigation titrate to keep it clear.  - He will be n.p.o. after midnight  - Plan for surgical intervention with Dr. Caicedo tomorrow        I spent 20 minutes in the professional and overall care of this patient.      Chaitanya Mckeon PA-C           [1] atorvastatin,  40 mg, oral, Daily  cefTRIAXone, 1 g, intravenous, q24h  finasteride, 5 mg, oral, Daily  tiotropium, 2 puff, inhalation, Daily   And  fluticasone furoate-vilanteroL, 1 puff, inhalation, Daily  [Held by provider] furosemide, 20 mg, oral, Every other day  morphine, 2 mg, intravenous, Once  [Held by provider] spironolactone, 12.5 mg, oral, Daily    [2]    [3] PRN medications: acetaminophen, albuterol, traMADol **OR** traMADol

## 2025-05-06 LAB
ALBUMIN SERPL BCP-MCNC: 3.6 G/DL (ref 3.4–5)
ALP SERPL-CCNC: 96 U/L (ref 33–136)
ALT SERPL W P-5'-P-CCNC: 19 U/L (ref 10–52)
ANION GAP SERPL CALC-SCNC: 11 MMOL/L (ref 10–20)
AST SERPL W P-5'-P-CCNC: 27 U/L (ref 9–39)
BASOPHILS # BLD AUTO: 0.05 X10*3/UL (ref 0–0.1)
BASOPHILS NFR BLD AUTO: 0.8 %
BILIRUB SERPL-MCNC: 1 MG/DL (ref 0–1.2)
BUN SERPL-MCNC: 28 MG/DL (ref 6–23)
CALCIUM SERPL-MCNC: 8.8 MG/DL (ref 8.6–10.3)
CHLORIDE SERPL-SCNC: 104 MMOL/L (ref 98–107)
CO2 SERPL-SCNC: 25 MMOL/L (ref 21–32)
CREAT SERPL-MCNC: 1.21 MG/DL (ref 0.5–1.3)
EGFRCR SERPLBLD CKD-EPI 2021: 57 ML/MIN/1.73M*2
EOSINOPHIL # BLD AUTO: 0.23 X10*3/UL (ref 0–0.4)
EOSINOPHIL NFR BLD AUTO: 3.7 %
ERYTHROCYTE [DISTWIDTH] IN BLOOD BY AUTOMATED COUNT: 13.2 % (ref 11.5–14.5)
GLUCOSE SERPL-MCNC: 104 MG/DL (ref 74–99)
HCT VFR BLD AUTO: 31.4 % (ref 41–52)
HGB BLD-MCNC: 9.8 G/DL (ref 13.5–17.5)
IMM GRANULOCYTES # BLD AUTO: 0.02 X10*3/UL (ref 0–0.5)
IMM GRANULOCYTES NFR BLD AUTO: 0.3 % (ref 0–0.9)
LYMPHOCYTES # BLD AUTO: 1.34 X10*3/UL (ref 0.8–3)
LYMPHOCYTES NFR BLD AUTO: 21.4 %
MCH RBC QN AUTO: 32.8 PG (ref 26–34)
MCHC RBC AUTO-ENTMCNC: 31.2 G/DL (ref 32–36)
MCV RBC AUTO: 105 FL (ref 80–100)
MONOCYTES # BLD AUTO: 0.61 X10*3/UL (ref 0.05–0.8)
MONOCYTES NFR BLD AUTO: 9.8 %
NEUTROPHILS # BLD AUTO: 4 X10*3/UL (ref 1.6–5.5)
NEUTROPHILS NFR BLD AUTO: 64 %
NRBC BLD-RTO: 0 /100 WBCS (ref 0–0)
PLATELET # BLD AUTO: 192 X10*3/UL (ref 150–450)
POTASSIUM SERPL-SCNC: 5 MMOL/L (ref 3.5–5.3)
PROT SERPL-MCNC: 5.9 G/DL (ref 6.4–8.2)
RBC # BLD AUTO: 2.99 X10*6/UL (ref 4.5–5.9)
SODIUM SERPL-SCNC: 135 MMOL/L (ref 136–145)
WBC # BLD AUTO: 6.3 X10*3/UL (ref 4.4–11.3)

## 2025-05-06 PROCEDURE — 85025 COMPLETE CBC W/AUTO DIFF WBC: CPT | Performed by: INTERNAL MEDICINE

## 2025-05-06 PROCEDURE — 51700 IRRIGATION OF BLADDER: CPT

## 2025-05-06 PROCEDURE — 94640 AIRWAY INHALATION TREATMENT: CPT

## 2025-05-06 PROCEDURE — 76857 US EXAM PELVIC LIMITED: CPT | Performed by: RADIOLOGY

## 2025-05-06 PROCEDURE — 51702 INSERT TEMP BLADDER CATH: CPT

## 2025-05-06 PROCEDURE — 36415 COLL VENOUS BLD VENIPUNCTURE: CPT | Performed by: INTERNAL MEDICINE

## 2025-05-06 PROCEDURE — 84075 ASSAY ALKALINE PHOSPHATASE: CPT | Performed by: INTERNAL MEDICINE

## 2025-05-06 PROCEDURE — 2500000004 HC RX 250 GENERAL PHARMACY W/ HCPCS (ALT 636 FOR OP/ED): Mod: JZ | Performed by: UROLOGY

## 2025-05-06 PROCEDURE — 2060000001 HC INTERMEDIATE ICU ROOM DAILY

## 2025-05-06 PROCEDURE — 2500000004 HC RX 250 GENERAL PHARMACY W/ HCPCS (ALT 636 FOR OP/ED): Mod: JZ | Performed by: INTERNAL MEDICINE

## 2025-05-06 RX ORDER — HYOSCYAMINE SULFATE 0.12 MG/1
0.12 TABLET, ORALLY DISINTEGRATING ORAL EVERY 4 HOURS PRN
Status: DISCONTINUED | OUTPATIENT
Start: 2025-05-06 | End: 2025-05-07

## 2025-05-06 RX ADMIN — HYDROMORPHONE HYDROCHLORIDE 0.5 MG: 1 INJECTION, SOLUTION INTRAMUSCULAR; INTRAVENOUS; SUBCUTANEOUS at 14:44

## 2025-05-06 RX ADMIN — TIOTROPIUM BROMIDE INHALATION SPRAY 2 PUFF: 3.12 SPRAY, METERED RESPIRATORY (INHALATION) at 06:56

## 2025-05-06 RX ADMIN — FLUTICASONE FUROATE AND VILANTEROL TRIFENATATE 1 PUFF: 200; 25 POWDER RESPIRATORY (INHALATION) at 06:58

## 2025-05-06 RX ADMIN — CEFTRIAXONE SODIUM 1 G: 1 INJECTION, SOLUTION INTRAVENOUS at 18:00

## 2025-05-06 ASSESSMENT — COGNITIVE AND FUNCTIONAL STATUS - GENERAL
MOBILITY SCORE: 19
CLIMB 3 TO 5 STEPS WITH RAILING: A LOT
DAILY ACTIVITIY SCORE: 19
STANDING UP FROM CHAIR USING ARMS: A LITTLE
HELP NEEDED FOR BATHING: A LITTLE
MOBILITY SCORE: 19
DAILY ACTIVITIY SCORE: 19
TOILETING: A LITTLE
DRESSING REGULAR UPPER BODY CLOTHING: A LITTLE
DRESSING REGULAR UPPER BODY CLOTHING: A LITTLE
WALKING IN HOSPITAL ROOM: A LITTLE
STANDING UP FROM CHAIR USING ARMS: A LITTLE
TOILETING: A LITTLE
DRESSING REGULAR LOWER BODY CLOTHING: A LITTLE
PERSONAL GROOMING: A LITTLE
HELP NEEDED FOR BATHING: A LITTLE
CLIMB 3 TO 5 STEPS WITH RAILING: A LOT
WALKING IN HOSPITAL ROOM: A LITTLE
PERSONAL GROOMING: A LITTLE
DRESSING REGULAR LOWER BODY CLOTHING: A LITTLE
MOVING TO AND FROM BED TO CHAIR: A LITTLE
MOVING TO AND FROM BED TO CHAIR: A LITTLE

## 2025-05-06 ASSESSMENT — ENCOUNTER SYMPTOMS
DIETARY ISSUES: NPO
SUBJECTIVE PATIENT PAIN CONTROL: PARTIALLY CONTROLLED

## 2025-05-06 ASSESSMENT — PAIN SCALES - GENERAL
PAINLEVEL_OUTOF10: 0 - NO PAIN
PAINLEVEL_OUTOF10: 0 - NO PAIN

## 2025-05-06 NOTE — CARE PLAN
The patient's goals for the shift include      The clinical goals for the shift include patient will remain hemodynamically stable througout the fshift    Over the shift, the patient did not make progress toward the following goals. Barriers to progression include . Recommendations to address these barriers include .    Problem: Pain - Adult  Goal: Verbalizes/displays adequate comfort level or baseline comfort level  Outcome: Not Progressing     Problem: Safety - Adult  Goal: Free from fall injury  Outcome: Not Progressing     Problem: Discharge Planning  Goal: Discharge to home or other facility with appropriate resources  Outcome: Not Progressing     Problem: Chronic Conditions and Co-morbidities  Goal: Patient's chronic conditions and co-morbidity symptoms are monitored and maintained or improved  Outcome: Not Progressing     Problem: Nutrition  Goal: Nutrient intake appropriate for maintaining nutritional needs  Outcome: Not Progressing     Problem: Fall/Injury  Goal: Not fall by end of shift  Outcome: Not Progressing  Goal: Be free from injury by end of the shift  Outcome: Not Progressing  Goal: Verbalize understanding of personal risk factors for fall in the hospital  Outcome: Not Progressing  Goal: Verbalize understanding of risk factor reduction measures to prevent injury from fall in the home  Outcome: Not Progressing  Goal: Use assistive devices by end of the shift  Outcome: Not Progressing  Goal: Pace activities to prevent fatigue by end of the shift  Outcome: Not Progressing     Problem: Skin  Goal: Promote skin healing  Outcome: Not Progressing

## 2025-05-06 NOTE — PROGRESS NOTES
"Assessment/Plan   Assessment & Plan  History of prostate cancer, with radiation, radiation cystitis, aorta ultrasound shows no evidence of blood clots, his urine is clear now, n.p.o. after midnight    Subjective   Subjective:   Diet: NPO.    Pain control: Partially controlled.      Temp:  [35.9 °C (96.6 °F)-36.5 °C (97.7 °F)] 36 °C (96.8 °F)  Heart Rate:  [50-73] 50  Resp:  [17-18] 18  BP: ()/(55-71) 111/60  I/O last 3 completed shifts:  In: 200 (2.8 mL/kg) [P.O.:100; IV Piggyback:100]  Out: 18402 (167.1 mL/kg) [Urine:32512 (4.6 mL/kg/hr)]  Dosing Weight: 70.3 kg   I/O this shift:  In: -   Out: -3350     Objective   Objective:  Vital signs (most recent): Blood pressure 111/60, pulse 50, temperature 36 °C (96.8 °F), temperature source Temporal, resp. rate 18, height 1.727 m (5' 8\"), weight 68 kg (150 lb), SpO2 96%.  General appearance: Comfortable.    Lungs:  Normal effort.      Assessment & Plan  Urinary retention    DOYLE (acute kidney injury)    Hematuria    Anemia      "

## 2025-05-06 NOTE — SIGNIFICANT EVENT
This STEVE house officer was contacted by pharmacist, Samira Ayoub, inquiring if I am able to order the patient's home medications. The patient is on the 6th day of his admission. His home medications have not yet been addressed. The patient has 3 antihypertensives on his home med list, and he has yet to receive these during this admission. At home, the patient is on Norvasc 2.5mg QD, Toprol 12.5mg QD, and Olmesartan 20mg QD. Pt BP trend has been quite well-controlled during this admission with SBP in last 24 hours ranging between . His heart rate has been 54-73. Will hold his home antihypertensives for now. Also note that the patient is being treated for hematuria with cardiology notes stating, “Unable to use anticoagulation because of the hematuria,” so these are placed on hold as well. Per attending physician, Dr. Stevens to resume his home medications when clinically indicated.

## 2025-05-06 NOTE — CARE PLAN
The patient's goals for the shift include      The clinical goals for the shift include Manage CBI/urine will remain free of clots

## 2025-05-06 NOTE — PROGRESS NOTES
Patrick Vázquez is a 89 y.o. male on day 5 of admission presenting with Urinary retention.      Subjective   Patient fully evaluated  05/02  for    Problem List Items Addressed This Visit       * (Principal) Urinary retention - Primary    Relevant Medications    cephalexin (Keflex) 500 mg capsule    Other Relevant Orders    Referral to Urology    Hematuria    Relevant Orders    Case Request Operating Room: CYSTOSCOPY, WITH BLADDER LESION ABLATION (Completed)     Patient seen resting in bed with head of bed elevated, no s/s or c/o acute difficulties at this time.  Vital signs for last 24 hours Temp:  [35.9 °C (96.6 °F)-36.5 °C (97.7 °F)] 36 °C (96.8 °F)  Heart Rate:  [50-73] 50  Resp:  [17-18] 18  BP: ()/(55-71) 111/60    I/O this shift:  In: -   Out: -3350   Patient still requiring frequent cardiac and SPO2 monitoring. Continue aggressive pulmonary hygiene and oral hygiene. Off loading as tolerated for skin integrity. Medications and labs reviewed-   Results for orders placed or performed during the hospital encounter of 04/30/25 (from the past 24 hours)   Comprehensive Metabolic Panel   Result Value Ref Range    Glucose 104 (H) 74 - 99 mg/dL    Sodium 135 (L) 136 - 145 mmol/L    Potassium 5.0 3.5 - 5.3 mmol/L    Chloride 104 98 - 107 mmol/L    Bicarbonate 25 21 - 32 mmol/L    Anion Gap 11 10 - 20 mmol/L    Urea Nitrogen 28 (H) 6 - 23 mg/dL    Creatinine 1.21 0.50 - 1.30 mg/dL    eGFR 57 (L) >60 mL/min/1.73m*2    Calcium 8.8 8.6 - 10.3 mg/dL    Albumin 3.6 3.4 - 5.0 g/dL    Alkaline Phosphatase 96 33 - 136 U/L    Total Protein 5.9 (L) 6.4 - 8.2 g/dL    AST 27 9 - 39 U/L    Bilirubin, Total 1.0 0.0 - 1.2 mg/dL    ALT 19 10 - 52 U/L   CBC and Auto Differential   Result Value Ref Range    WBC 6.3 4.4 - 11.3 x10*3/uL    nRBC 0.0 0.0 - 0.0 /100 WBCs    RBC 2.99 (L) 4.50 - 5.90 x10*6/uL    Hemoglobin 9.8 (L) 13.5 - 17.5 g/dL    Hematocrit 31.4 (L) 41.0 - 52.0 %     (H) 80 - 100 fL    MCH 32.8 26.0 - 34.0 pg     MCHC 31.2 (L) 32.0 - 36.0 g/dL    RDW 13.2 11.5 - 14.5 %    Platelets 192 150 - 450 x10*3/uL    Neutrophils % 64.0 40.0 - 80.0 %    Immature Granulocytes %, Automated 0.3 0.0 - 0.9 %    Lymphocytes % 21.4 13.0 - 44.0 %    Monocytes % 9.8 2.0 - 10.0 %    Eosinophils % 3.7 0.0 - 6.0 %    Basophils % 0.8 0.0 - 2.0 %    Neutrophils Absolute 4.00 1.60 - 5.50 x10*3/uL    Immature Granulocytes Absolute, Automated 0.02 0.00 - 0.50 x10*3/uL    Lymphocytes Absolute 1.34 0.80 - 3.00 x10*3/uL    Monocytes Absolute 0.61 0.05 - 0.80 x10*3/uL    Eosinophils Absolute 0.23 0.00 - 0.40 x10*3/uL    Basophils Absolute 0.05 0.00 - 0.10 x10*3/uL      Patient recently received an antibiotic (last 12 hours)       None           Plan discussed with interdisciplinary team, paz with CBI to continue, hematuria still present, no clots, titrate to clear pink per urology, appreciate input. Patient hemoglobin slight drop overnight @ 10.2 from 11.0, creatinine improved @ 1.6, will continue to trend, continue current and repeat labs in the AM.     Discharge planning discussed with patient and care team. Therapy evaluations ordered. Anticipate HHC/SNF at discharge. Patient aware and agreeable to current plan, continue plan as above.     I spent a total of 60 minutes on the date of the service which included preparing to see the patient, face-to-face patient care, completing clinical documentation, obtaining and/or reviewing separately obtained history, performing a medically appropriate examination, counseling and educating the patient/family/caregiver, ordering medications, tests, or procedures, communicating with other HCPs (not separately reported), independently interpreting results (not separately reported), communicating results to the patient/family/caregiver, and care coordination (not separately reported).        Objective     Last Recorded Vitals  /60 (BP Location: Right arm, Patient Position: Lying)   Pulse 50   Temp 36 °C (96.8  °F) (Temporal)   Resp 18   Wt 68 kg (150 lb)   SpO2 96%   Intake/Output last 3 Shifts:    Intake/Output Summary (Last 24 hours) at 5/6/2025 1351  Last data filed at 5/6/2025 0840  Gross per 24 hour   Intake 50 ml   Output 250 ml   Net -200 ml       Admission Weight  Weight: 70.3 kg (155 lb) (04/30/25 2024)    Daily Weight  05/03/25 : 68 kg (150 lb)    Image Results  US bladder  Narrative: Interpreted By:  Perry Coughlin,   STUDY:  US BLADDER;  5/6/2025 1:01 pm      INDICATION:  Signs/Symptoms:Hematuria, blood clots.          COMPARISON:  CT abdomen/pelvis of 05/02/2025.      ACCESSION NUMBER(S):  [id number]      ORDERING CLINICIAN:  SHANNON KEE      TECHNIQUE:  Real-time sonographic evaluation of the urinary bladder was performed.      FINDINGS:  BLADDER:  Distended bladder has a calculated volume of  292 mL.      Zarco catheter with balloon is visualized in the urinary bladder. No  additional intraluminal mass or shadowing calculus is identified.      Bladder empties completely via the Zarco catheter.      Impression: No focal urinary bladder abnormality identified.      Bladder empties completely via the Zarco catheter              MACRO:  None.      Signed by: Perry Coughlin 5/6/2025 1:18 PM  Dictation workstation:   [id number]      Physical Exam  Vitals and nursing note reviewed.     onstitutional:       Appearance: Normal appearance.   HENT:      Mouth/Throat:      Mouth: Mucous membranes are dry.      Pharynx: Oropharynx is clear.   Eyes:      Pupils: Pupils are equal, round, and reactive to light.   Cardiovascular:      Rate and Rhythm: Normal rate and regular rhythm.   Pulmonary:      Effort: Pulmonary effort is normal.      Breath sounds: Normal breath sounds.   Abdominal:      General: Bowel sounds are normal.      Palpations: Abdomen is soft.   Genitourinary:     Comments: Zarco catheter  Musculoskeletal:         General: Normal range of motion.      Cervical back: Normal range of motion.      " Comments: \"Sock line\" pitting edema B/L ankles   Skin:     General: Skin is warm and dry.      Capillary Refill: Capillary refill takes less than 2 seconds.   Neurological:      General: No focal deficit present.      Mental Status: He is alert and oriented to person, place, and time.     Relevant Results               This patient currently has cardiac telemetry ordered; if you would like to modify or discontinue the telemetry order, click here to go to the orders activity to modify/discontinue the order.      This patient has a urinary catheter   Reason for the urinary catheter remaining today? urinary retention/bladder outlet obstruction, acute or chronic          Assessment & Plan  Urinary retention    DOYLE (acute kidney injury)    Hematuria    Anemia                   Gordo Stevens MD   Physician  Internal Medicine     H&P     Signed     Date of Service: 5/1/2025 12:22 PM     Signed       Expand All Collapse All    History Of Present Illness  Patrick Vázquez is a 89 y.o. male with past medical history significant for prostate cancer, hypertension, heart failure, COPD, diverticulosis, NSTEMI (2022) with stent placement, mitral regurgitation s/p MitraClip presenting to emergency department for evaluation of urinary retention.  Patient states that his urologist placed a Zarco catheter approximately 1 week ago due to urinary retention.  He states that his Zarco catheter was removed around noon and since then he had been unable to urinate.  Patient states he developed lower abdominal pain.  Patient denies nausea, vomiting, diarrhea, recent illness.  Patient denies chest pain or dizziness.  Patient states he is currently anticoagulated on Plavix, baby aspirin, and Eliquis due to a mixup in his medications.  Patient was removed from his Plavix on 4/7/2025 by his cardiologist Dr. Shin, however the patient states that he never actually stopped taking the Plavix.  He called the cardiologist office on 4/25/2025 " when his urologist questioned why he was on Plavix and Eliquis.  Patient stated at that time he would stop taking the Plavix however he now states that he is confused and is unsure if he actually stopped taking it.     In ED, a bladder scan was completed and revealed 440 mL of urine.  A Zarco catheter was inserted and the patient was initially going to be discharged home on Keflex prophylactically to follow-up with his urologist.  Upon discharging the patient he developed jeremiah hematuria which clogged his Zarco catheter.  Patient is currently requiring continuous bladder irrigation.  Basic labs obtained and ordered by me.  PT 17.2, INR 1.6, hemoglobin 11.0, hematocrit 33.4, platelets 138, glucose 106, BUN 43, creatinine 1.45, GFR 46.  Urinalysis showing dark brown turbid urine with 3+ blood and 75 leukocytes, no WBCs.  Consult placed to urology.  Blood pressure currently 119/56, heart rate 66, respirations 18, temperature 36.6 °C, SpO2 96% on room air.  Patient is a DNR/DNI.  Has pitting edema on his bilateral lower ankles, history of heart failure.  No IV fluids given.  Echocardiogram 3/6/2025 with an EF of 50 to 55%, mild AVR.  Patient will be admitted to telemetry under the care of Dr. Stevens who will continue to follow.  I was asked to do H&P and place initial admission orders.  PCP Dr. Sean Godwin.     Prior medical records reviewed by me.     Past Medical History  Prostate cancer, hypertension, CHF, cataract, COPD, diverticulosis, DJD, NSTEMI 2022, mitral regurgitation s/p MitraClip  Surgical History  MitraClip, hip replacement, prostatectomy, cardiac stent placement, cardiac catheterization     Social History  Former smoker, no drug use, social alcohol use, , lives at home with his wife  Family History  Reviewed and noncontributory     Allergies  Bee venom protein (honey bee)     Review of Systems   HENT:  Positive for congestion.    Respiratory:  Positive for shortness of breath.    Cardiovascular:   "Positive for leg swelling.   Genitourinary:  Positive for decreased urine volume and difficulty urinating.   Neurological:  Positive for weakness.   All other systems reviewed and are negative.     A 10 point review of systems was completed and is negative except what is listed in HPI  Physical Exam  Constitutional:       Appearance: Normal appearance.   HENT:      Mouth/Throat:      Mouth: Mucous membranes are dry.      Pharynx: Oropharynx is clear.   Eyes:      Pupils: Pupils are equal, round, and reactive to light.   Cardiovascular:      Rate and Rhythm: Normal rate and regular rhythm.   Pulmonary:      Effort: Pulmonary effort is normal.      Breath sounds: Normal breath sounds.   Abdominal:      General: Bowel sounds are normal.      Palpations: Abdomen is soft.   Genitourinary:     Comments: Zarco catheter  Musculoskeletal:         General: Normal range of motion.      Cervical back: Normal range of motion.      Comments: \"Sock line\" pitting edema B/L ankles   Skin:     General: Skin is warm and dry.      Capillary Refill: Capillary refill takes less than 2 seconds.   Neurological:      General: No focal deficit present.      Mental Status: He is alert and oriented to person, place, and time.            Last Recorded Vitals  Blood pressure 98/51, pulse 68, temperature 36.6 °C (97.9 °F), resp. rate (!) 22, weight 70.3 kg (155 lb), SpO2 93%.     Relevant Results  No results found.        Results for orders placed or performed during the hospital encounter of 04/30/25 (from the past 24 hours)   Urinalysis with Reflex Culture and Microscopic   Result Value Ref Range     Color, Urine Dark-Brown (N) Light-Yellow, Yellow, Dark-Yellow     Appearance, Urine Ex.Turbid (N) Clear     Specific Gravity, Urine 1.019 1.005 - 1.035     pH, Urine 7.0 5.0, 5.5, 6.0, 6.5, 7.0, 7.5, 8.0     Protein, Urine 300 (3+) (A) NEGATIVE, 10 (TRACE), 20 (TRACE) mg/dL     Glucose, Urine Normal Normal mg/dL     Blood, Urine OVER (3+) (A) " NEGATIVE mg/dL     Ketones, Urine NEGATIVE NEGATIVE mg/dL     Bilirubin, Urine NEGATIVE NEGATIVE mg/dL     Urobilinogen, Urine Normal Normal mg/dL     Nitrite, Urine NEGATIVE NEGATIVE     Leukocyte Esterase, Urine 75 Connie/uL (A) NEGATIVE   Extra Urine Gray Tube   Result Value Ref Range     Extra Tube Hold for add-ons.     Microscopic Only, Urine   Result Value Ref Range     WBC, Urine 1-5 1-5, NONE /HPF     RBC, Urine >20 (A) NONE, 1-2, 3-5 /HPF   Coagulation Screen   Result Value Ref Range     Protime 17.2 (H) 9.8 - 12.4 seconds     INR 1.6 (H) 0.9 - 1.1     aPTT 29 26 - 36 seconds   CBC   Result Value Ref Range     WBC 6.3 4.4 - 11.3 x10*3/uL     nRBC 0.0 0.0 - 0.0 /100 WBCs     RBC 3.28 (L) 4.50 - 5.90 x10*6/uL     Hemoglobin 11.0 (L) 13.5 - 17.5 g/dL     Hematocrit 33.4 (L) 41.0 - 52.0 %      (H) 80 - 100 fL     MCH 33.5 26.0 - 34.0 pg     MCHC 32.9 32.0 - 36.0 g/dL     RDW 13.7 11.5 - 14.5 %     Platelets 138 (L) 150 - 450 x10*3/uL   Basic metabolic panel   Result Value Ref Range     Glucose 106 (H) 74 - 99 mg/dL     Sodium 137 136 - 145 mmol/L     Potassium 5.2 3.5 - 5.3 mmol/L     Chloride 104 98 - 107 mmol/L     Bicarbonate 24 21 - 32 mmol/L     Anion Gap 14 10 - 20 mmol/L     Urea Nitrogen 43 (H) 6 - 23 mg/dL     Creatinine 1.45 (H) 0.50 - 1.30 mg/dL     eGFR 46 (L) >60 mL/min/1.73m*2     Calcium 9.6 8.6 - 10.3 mg/dL         Assessment & Plan  Urinary retention     DOYLE (acute kidney injury)     Hematuria     Anemia        Patrick is a 89-year-old male patient presented to emergency department for evaluation of urinary retention.  Patient with a history of prostate cancer states he had a Zarco catheter placed 1 week ago and was removed at approximately noon today.  Patient states that since then he has been unable to urinate and developed lower abdominal pain.  Patient with a bladder scan of 440 in ED.  Had a Zarco catheter placed and was initially going to be discharged home on prophylactic Keflex with  follow-up to his urologist.  Patient developed jeremiah hematuria which clogged his Paz catheter requiring continuous bladder irrigation.  Patient also has a medication mixup in which she has been taking Plavix, Eliquis, and baby aspirin daily.  Per medical records patient was removed from his Plavix on 4/7/2025 by his cardiologist.  There was a telephone conversation in which this was cleared up again on 4/25/2025 and the patient states he would take that Plavix out of his pillbox but he is unsure whether he did.  Consult placed to urology.  Labs obtained, patient found to have an DOYLE and anemia.  Vital signs within normal limits.  Patient admitted for further medical management.     Urinary retention/DOYLE/hematuria/anemia  Inpatient telemetry admission to Dr. Hilda Paz/continuous bladder irrigation  Consult urology and appreciate input  Hold home anticoagulation including Eliquis, baby aspirin  Patient requiring medication education  Trend hemoglobin  No IV fluids for DOYLE 2/2 history of CHF  Hold home furosemide/Aldactone 2/2 DOYLE  Sock pitting edema noted to bilateral lower extremities  Cardiac/low-sodium diet  Tylenol as needed     Prostate cancer/HTN/CHF/COPD/diverticulosis/CAD/DJD  #Chronic conditions  Continue home atorvastatin 40 mg daily  Continue home inhalers  Hold home Eliquis, baby aspirin, Lasix, Aldactone  Attending to restart medications as appropriate  Echocardiogram on file 3/6/2025: EF 50 to 55%, mild AVR     DVT Ppx  SCDs  No chemical prophylaxis 2/2 hematuria  Up to chair with assistance             Chief Complaint   Patient presents with    Urinary Retention       Had paz removed this am, has not urinated since.         This is a 89 years old male patient presented to the emergency department with a chief complaint of urinary retention.  Stated that today around noon he removed his Paz catheter and he could not pass urine since then.  Denies any flank pain, abdominal pain, nausea,  vomiting, fever, chills, body aches, chest pain.     Review of system: As stated above in the HPI section.     had concerns including Urinary Retention (Had paz removed this am, has not urinated since.).        Problem List Items Addressed This Visit         * (Principal) Urinary retention - Primary     Relevant Medications     cephalexin (Keflex) 500 mg capsule     Other Relevant Orders     Referral to Urology     Hematuria         HPI         Urinary Retention     Additional comments: Had paz removed this am, has not urinated since.            Last edited by Javid Moya RN on 4/30/2025  8:26 PM.             Problem List as of 5/1/2025 Reviewed: 12/30/2024 11:23 PM by BONNIE Cristobal-CNP        Acquired spondylolisthesis of lumbosacral region     COPD (chronic obstructive pulmonary disease) (Multi)     Arthritis of left hip     Coronary artery disease     Diverticulosis     DJD (degenerative joint disease)     Dyslipidemia     HTN (hypertension)     Mitral valve insufficiency     Prostate CA (Multi)     S/P mitral valve clip implantation     Dyspnea on exertion     Cataract, nuclear sclerotic, both eyes     Contusion of hip     Eye strain, bilateral     History of malignant neoplasm of skin     History of repair of hip joint     Hyperopia of both eyes with regular astigmatism     Regular astigmatism of both eyes with presbyopia     Left hip pain     Low back pain     CHF (congestive heart failure), NYHA class II, acute on chronic, diastolic     * (Principal) Urinary retention     DOYLE (acute kidney injury)     Hematuria     Anemia              Active Ambulatory Problems     Diagnosis Date Noted    Acquired spondylolisthesis of lumbosacral region 02/20/2024    COPD (chronic obstructive pulmonary disease) (Multi) 02/20/2024    Arthritis of left hip 02/20/2024    Coronary artery disease 02/20/2024    Diverticulosis 02/20/2024    DJD (degenerative joint disease) 02/20/2024    Dyslipidemia 02/20/2024    HTN  (hypertension) 02/20/2024    Mitral valve insufficiency 02/20/2024    Prostate CA (Multi) 02/20/2024    S/P mitral valve clip implantation 02/20/2024    Dyspnea on exertion 02/20/2024    Cataract, nuclear sclerotic, both eyes 05/02/2023    Contusion of hip 03/01/2024    Eye strain, bilateral 03/01/2024    History of malignant neoplasm of skin 03/01/2024    History of repair of hip joint 03/01/2024    Hyperopia of both eyes with regular astigmatism 03/01/2024    Regular astigmatism of both eyes with presbyopia 03/01/2024    Left hip pain 03/01/2024    Low back pain 03/01/2024    CHF (congestive heart failure), NYHA class II, acute on chronic, diastolic 11/01/2024           Resolved Ambulatory Problems     Diagnosis Date Noted    Congestive heart failure 03/01/2024    Hyperlipoproteinemia 02/20/2024           Past Medical History:   Diagnosis Date    Cataract      CHF (congestive heart failure)      Hypertension      Personal history of malignant neoplasm of prostate      Personal history of other malignant neoplasm of skin                      Active Orders   Procedures     Irrigation of Bladder       Frequency: Once       Number of Occurrences: 1 Occurrences       Order Comments: Please proceed with continuous bladder irrigation.  Thank you      Diet     Adult diet Cardiac; 70 gm fat; 2 - 3 grams Sodium       Frequency: Effective now       Number of Occurrences: Until Specified   Nursing     Activity (specify) Activity With Exceptions; Up in chair       Frequency: Until discontinued       Number of Occurrences: Until Specified     Apply sequential compression device       Frequency: Until discontinued       Number of Occurrences: Until Specified     Height on admission       Frequency: Once       Number of Occurrences: 1 Occurrences     Maintain Urethral Catheter with Nurse Driven Indwelling Urethral Catheter Removal Protocol       Frequency: Until discontinued       Number of Occurrences: Until Specified      Maintain Urethral Catheter with Nurse Driven Indwelling Urethral Catheter Removal Protocol       Frequency: Until discontinued       Number of Occurrences: Until Specified     No Isolation Required       Frequency: Once       Number of Occurrences: 1 Occurrences     Notify provider       Frequency: Until discontinued       Number of Occurrences: Until Specified     Nursing Guidelines: Paz Cath       Frequency: Until discontinued       Number of Occurrences: Until Specified       Order Comments: 1. Perform a Post Voiding Residual (PVR) using the Bladder Volume Control Indicator (BVI) the first two voidings after a paz catheter is removed.  If the PVR is greater than 450cc perform straight cath.  If the patient requires a second straight cath procedure notify physician on the next two rounds.  2. If the patient has not voided after 8 hours check volume with the BVI and straight cath if over 450cc.  3. If no voiding at discharge reinsert indwelling urinary catheter and recommend referral to Urology Clinic at discharge.   4. Notify physician of indwelling urinary catheter replacement        Pain Assessment       Frequency: Per unit standards       Number of Occurrences: Until Specified     Physician Communication       Frequency: Until discontinued       Number of Occurrences: Until Specified       Order Comments: IF patient requires a second straight cath procedure after paz removed, notify physician on the next rounds        Vital Signs       Frequency: q4h       Number of Occurrences: Until Specified     Weight on admission       Frequency: Once       Number of Occurrences: 1 Occurrences   Consult     Inpatient consult to Social Work and TCC       Frequency: Once       Number of Occurrences: 1 Occurrences     Inpatient consult to Urology       Frequency: Once       Number of Occurrences: 1 Occurrences   Nourishments     May Participate in Room Service       Frequency: Once       Number of Occurrences: 1  Occurrences   Respiratory Care     Respiratory care eval and treat       Frequency: Once       Number of Occurrences: 1 Occurrences   ECG     ECG 12 lead       Frequency: Once       Number of Occurrences: 1 Occurrences     Electrocardiogram, 12-lead PRN ACS symptoms       Frequency: PRN       Number of Occurrences: Until Specified       Order Comments: Notify provider if performed.      Telemetry     Telemetry monitoring - Floor only       Frequency: Until discontinued       Number of Occurrences: 3 Days   Medications     acetaminophen (Tylenol) tablet 650 mg       Frequency: q4h PRN       Dose: 650 mg       Route: oral     albuterol 90 mcg/actuation inhaler 2 puff       Frequency: q6h PRN       Dose: 2 puff       Route: inhalation     atorvastatin (Lipitor) tablet 40 mg       Frequency: Daily       Dose: 40 mg       Route: oral     fluticasone furoate-vilanteroL (Breo Ellipta) 200-25 mcg/dose inhaler 1 puff       Linked Order: And       Frequency: Daily       Dose: 1 puff       Route: inhalation     furosemide (Lasix) tablet 20 mg       Frequency: Every other day       Dose: 20 mg       Route: oral     spironolactone (Aldactone) tablet 12.5 mg       Frequency: Daily       Dose: 12.5 mg       Route: oral     tiotropium (Spiriva Respimat) 2.5 mcg/actuation inhaler 2 puff       Linked Order: And       Frequency: Daily       Dose: 2 puff       Route: inhalation      Dietary Orders (From admission, onward)          Start     Ordered     05/01/25 0213   May Participate in Room Service  ( ROOM SERVICE MAY PARTICIPATE)  Once        Question:  .  Answer:  Yes    05/01/25 0212 05/01/25 0012   Adult diet Cardiac; 70 gm fat; 2 - 3 grams Sodium  Diet effective now        Question Answer Comment   Diet type Cardiac     Fat restriction: 70 gm fat     Sodium restriction: 2 - 3 grams Sodium         05/01/25 0011                       89 yrs@  ADMITDTTM@  Urinary retention [R33.9]  Hematuria, unspecified type  [R31.9]  [unfilled]  Weight: 70.3 kg (155 lb)     Vitals          Vitals:     04/30/25 2024 04/30/25 2100 04/30/25 2200 05/01/25 0209   BP: 119/56 116/64   107/67   Pulse: 83 56 66 58   Resp: (!) 22         Temp: 36.6 °C (97.9 °F)     36.5 °C (97.7 °F)   SpO2: 99% 100% 98% 98%   Weight: 70.3 kg (155 lb)           05/01/25 0351 05/01/25 0800 05/01/25 1129   BP: 109/67 112/61 98/51   Pulse: 57 60 68   Resp:         Temp: 36.3 °C (97.3 °F) 36.4 °C (97.5 °F) 36.6 °C (97.9 °F)   SpO2: 97% (!) 89% 93%   Weight:                        Chief Complaint    Urinary Retention            Patient seen resting in bed with head of bed elevated, no s/s or c/o acute difficulties at this time.  Vital signs for last 24 hours Temp:  [36.3 °C (97.3 °F)-36.6 °C (97.9 °F)] 36.6 °C (97.9 °F)  Heart Rate:  [56-83] 68  Resp:  [22] 22  BP: ()/(51-67) 98/51    I/O this shift:  In: -   Out: 600 [Urine:600]  Patient still requiring frequent cardiac and SPO2 monitoring. Continue aggressive pulmonary hygiene and oral hygiene. Off loading as tolerated for skin integrity. Medications and labs reviewed-    Patient recently received an antibiotic (last 12 hours)         None                    Results for orders placed or performed during the hospital encounter of 04/30/25 (from the past 96 hours)   Urinalysis with Reflex Culture and Microscopic   Result Value Ref Range     Color, Urine Dark-Brown (N) Light-Yellow, Yellow, Dark-Yellow     Appearance, Urine Ex.Turbid (N) Clear     Specific Gravity, Urine 1.019 1.005 - 1.035     pH, Urine 7.0 5.0, 5.5, 6.0, 6.5, 7.0, 7.5, 8.0     Protein, Urine 300 (3+) (A) NEGATIVE, 10 (TRACE), 20 (TRACE) mg/dL     Glucose, Urine Normal Normal mg/dL     Blood, Urine OVER (3+) (A) NEGATIVE mg/dL     Ketones, Urine NEGATIVE NEGATIVE mg/dL     Bilirubin, Urine NEGATIVE NEGATIVE mg/dL     Urobilinogen, Urine Normal Normal mg/dL     Nitrite, Urine NEGATIVE NEGATIVE     Leukocyte Esterase, Urine 75 Connie/uL (A) NEGATIVE    Extra Urine Gray Tube   Result Value Ref Range     Extra Tube Hold for add-ons.     Microscopic Only, Urine   Result Value Ref Range     WBC, Urine 1-5 1-5, NONE /HPF     RBC, Urine >20 (A) NONE, 1-2, 3-5 /HPF   Coagulation Screen   Result Value Ref Range     Protime 17.2 (H) 9.8 - 12.4 seconds     INR 1.6 (H) 0.9 - 1.1     aPTT 29 26 - 36 seconds   CBC   Result Value Ref Range     WBC 6.3 4.4 - 11.3 x10*3/uL     nRBC 0.0 0.0 - 0.0 /100 WBCs     RBC 3.28 (L) 4.50 - 5.90 x10*6/uL     Hemoglobin 11.0 (L) 13.5 - 17.5 g/dL     Hematocrit 33.4 (L) 41.0 - 52.0 %      (H) 80 - 100 fL     MCH 33.5 26.0 - 34.0 pg     MCHC 32.9 32.0 - 36.0 g/dL     RDW 13.7 11.5 - 14.5 %     Platelets 138 (L) 150 - 450 x10*3/uL   Basic metabolic panel   Result Value Ref Range     Glucose 106 (H) 74 - 99 mg/dL     Sodium 137 136 - 145 mmol/L     Potassium 5.2 3.5 - 5.3 mmol/L     Chloride 104 98 - 107 mmol/L     Bicarbonate 24 21 - 32 mmol/L     Anion Gap 14 10 - 20 mmol/L     Urea Nitrogen 43 (H) 6 - 23 mg/dL     Creatinine 1.45 (H) 0.50 - 1.30 mg/dL     eGFR 46 (L) >60 mL/min/1.73m*2     Calcium 9.6 8.6 - 10.3 mg/dL      Patient fully evaluated 05/01, thorough record review performed of previous labs and notes from prior encounters. Plan discussed with interdisciplinary team, consults placed, appreciate input. Will continue current and repeat labs in the AM.       Discharge planning discussed with patient and care team. Therapy evaluations ordered. Patient aware and agreeable to current plan, continue plan as above.      I spent a total of 75 minutes on the date of the service which included preparing to see the patient, face-to-face patient care, completing clinical documentation, obtaining and/or reviewing separately obtained history, performing a medically appropriate examination, counseling and educating the patient/family/caregiver, ordering medications, tests, or procedures, communicating with other HCPs (not separately  reported), independently interpreting results (not separately reported), communicating results to the patient/family/caregiver, and care coordination (not separately reported).            Sangeetha Tim                   Revision History        Patient fully evaluated on May 3.  H&H are stable.  Still with significant mobility issues.  Recheck labs in AM.  To have recheck labs in AM.  Still having hematuria.  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  Continue present IV antibiotics for contaminated urine.    Patient fully evaluated  05/04  for    Problem List Items Addressed This Visit       * (Principal) Urinary retention - Primary    Relevant Medications    cephalexin (Keflex) 500 mg capsule    Other Relevant Orders    Referral to Urology    Hematuria    Relevant Orders    Case Request Operating Room: CYSTOSCOPY, WITH BLADDER LESION ABLATION (Completed)     Patient seen resting in bed with head of bed elevated, no s/s or c/o acute difficulties at this time.  Vital signs for last 24 hours Temp:  [35.9 °C (96.6 °F)-36.5 °C (97.7 °F)] 36 °C (96.8 °F)  Heart Rate:  [50-73] 50  Resp:  [17-18] 18  BP: ()/(55-71) 111/60    I/O this shift:  In: -   Out: -3350   Patient still requiring frequent cardiac and SPO2 monitoring. Continue aggressive pulmonary hygiene and oral hygiene. Off loading as tolerated for skin integrity. Medications and labs reviewed-   Results for orders placed or performed during the hospital encounter of 04/30/25 (from the past 24 hours)   Comprehensive Metabolic Panel   Result Value Ref Range    Glucose 104 (H) 74 - 99 mg/dL    Sodium 135 (L) 136 - 145 mmol/L    Potassium 5.0 3.5 - 5.3 mmol/L    Chloride 104 98 - 107 mmol/L    Bicarbonate 25 21 - 32 mmol/L    Anion Gap 11 10 - 20 mmol/L    Urea Nitrogen 28 (H) 6 - 23 mg/dL    Creatinine 1.21 0.50 - 1.30 mg/dL    eGFR 57 (L) >60 mL/min/1.73m*2    Calcium 8.8 8.6 - 10.3 mg/dL    Albumin 3.6 3.4 - 5.0 g/dL    Alkaline Phosphatase 96 33 - 136 U/L    Total  Protein 5.9 (L) 6.4 - 8.2 g/dL    AST 27 9 - 39 U/L    Bilirubin, Total 1.0 0.0 - 1.2 mg/dL    ALT 19 10 - 52 U/L   CBC and Auto Differential   Result Value Ref Range    WBC 6.3 4.4 - 11.3 x10*3/uL    nRBC 0.0 0.0 - 0.0 /100 WBCs    RBC 2.99 (L) 4.50 - 5.90 x10*6/uL    Hemoglobin 9.8 (L) 13.5 - 17.5 g/dL    Hematocrit 31.4 (L) 41.0 - 52.0 %     (H) 80 - 100 fL    MCH 32.8 26.0 - 34.0 pg    MCHC 31.2 (L) 32.0 - 36.0 g/dL    RDW 13.2 11.5 - 14.5 %    Platelets 192 150 - 450 x10*3/uL    Neutrophils % 64.0 40.0 - 80.0 %    Immature Granulocytes %, Automated 0.3 0.0 - 0.9 %    Lymphocytes % 21.4 13.0 - 44.0 %    Monocytes % 9.8 2.0 - 10.0 %    Eosinophils % 3.7 0.0 - 6.0 %    Basophils % 0.8 0.0 - 2.0 %    Neutrophils Absolute 4.00 1.60 - 5.50 x10*3/uL    Immature Granulocytes Absolute, Automated 0.02 0.00 - 0.50 x10*3/uL    Lymphocytes Absolute 1.34 0.80 - 3.00 x10*3/uL    Monocytes Absolute 0.61 0.05 - 0.80 x10*3/uL    Eosinophils Absolute 0.23 0.00 - 0.40 x10*3/uL    Basophils Absolute 0.05 0.00 - 0.10 x10*3/uL      Patient recently received an antibiotic (last 12 hours)       None           Plan discussed with interdisciplinary team, patient continues with ambulatory dysfunction and significant mobility issues. Hematuria continues, will monitor H&H, strict intake and output. 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  Continue present IV antibiotics, continue current and repeat labs in the AM.     Discharge planning discussed with patient and care team. Therapy evaluations ordered. Anticipate HHC/SNF at discharge. Patient aware and agreeable to current plan, continue plan as above.     I spent a total of 60 minutes on the date of the service which included preparing to see the patient, face-to-face patient care, completing clinical documentation, obtaining and/or reviewing separately obtained history, performing a medically appropriate examination, counseling and educating the patient/family/caregiver, ordering  medications, tests, or procedures, communicating with other HCPs (not separately reported), independently interpreting results (not separately reported), communicating results to the patient/family/caregiver, and care coordination (not separately reported).     Patient fully evaluated  5/5  for    Problem List Items Addressed This Visit       * (Principal) Urinary retention - Primary    Relevant Medications    cephalexin (Keflex) 500 mg capsule    Other Relevant Orders    Referral to Urology    Hematuria    Relevant Orders    Case Request Operating Room: CYSTOSCOPY, WITH BLADDER LESION ABLATION (Completed)     Patient seen resting in bed with head of bed elevated, no s/s or c/o acute difficulties at this time.  Vital signs for last 24 hours Temp:  [35.9 °C (96.6 °F)-36.5 °C (97.7 °F)] 36 °C (96.8 °F)  Heart Rate:  [50-73] 50  Resp:  [17-18] 18  BP: ()/(55-71) 111/60    I/O this shift:  In: -   Out: -3350   Patient still requiring frequent cardiac and SPO2 monitoring. Continue aggressive pulmonary hygiene and oral hygiene. Off loading as tolerated for skin integrity. Medications and labs reviewed-   Results for orders placed or performed during the hospital encounter of 04/30/25 (from the past 24 hours)   Comprehensive Metabolic Panel   Result Value Ref Range    Glucose 104 (H) 74 - 99 mg/dL    Sodium 135 (L) 136 - 145 mmol/L    Potassium 5.0 3.5 - 5.3 mmol/L    Chloride 104 98 - 107 mmol/L    Bicarbonate 25 21 - 32 mmol/L    Anion Gap 11 10 - 20 mmol/L    Urea Nitrogen 28 (H) 6 - 23 mg/dL    Creatinine 1.21 0.50 - 1.30 mg/dL    eGFR 57 (L) >60 mL/min/1.73m*2    Calcium 8.8 8.6 - 10.3 mg/dL    Albumin 3.6 3.4 - 5.0 g/dL    Alkaline Phosphatase 96 33 - 136 U/L    Total Protein 5.9 (L) 6.4 - 8.2 g/dL    AST 27 9 - 39 U/L    Bilirubin, Total 1.0 0.0 - 1.2 mg/dL    ALT 19 10 - 52 U/L   CBC and Auto Differential   Result Value Ref Range    WBC 6.3 4.4 - 11.3 x10*3/uL    nRBC 0.0 0.0 - 0.0 /100 WBCs    RBC 2.99 (L)  4.50 - 5.90 x10*6/uL    Hemoglobin 9.8 (L) 13.5 - 17.5 g/dL    Hematocrit 31.4 (L) 41.0 - 52.0 %     (H) 80 - 100 fL    MCH 32.8 26.0 - 34.0 pg    MCHC 31.2 (L) 32.0 - 36.0 g/dL    RDW 13.2 11.5 - 14.5 %    Platelets 192 150 - 450 x10*3/uL    Neutrophils % 64.0 40.0 - 80.0 %    Immature Granulocytes %, Automated 0.3 0.0 - 0.9 %    Lymphocytes % 21.4 13.0 - 44.0 %    Monocytes % 9.8 2.0 - 10.0 %    Eosinophils % 3.7 0.0 - 6.0 %    Basophils % 0.8 0.0 - 2.0 %    Neutrophils Absolute 4.00 1.60 - 5.50 x10*3/uL    Immature Granulocytes Absolute, Automated 0.02 0.00 - 0.50 x10*3/uL    Lymphocytes Absolute 1.34 0.80 - 3.00 x10*3/uL    Monocytes Absolute 0.61 0.05 - 0.80 x10*3/uL    Eosinophils Absolute 0.23 0.00 - 0.40 x10*3/uL    Basophils Absolute 0.05 0.00 - 0.10 x10*3/uL      Patient recently received an antibiotic (last 12 hours)       None           Patient vitals include     Temp:  [35.9 °C (96.6 °F)-37.4 °C (99.3 °F)] 36.6 °C (97.9 °F)  Heart Rate:  [51-90] 64  Resp:  [16-20] 20  BP: (102-130)/(58-71) 130/69  FiO2 (%):  [21 %] 21 %    Patient stable, up in chair and in good spirits. Urology saw patient, patient is scheduled for cystoscopy with fulguration of bleeders tomorrow. CT abdomen showed mild edema and small bilateral pleural effusions. Will continue to monitor.   Plan discussed with interdisciplinary team, continue current and repeat labs in the AM.     Discharge planning discussed with patient and care team. Therapy evaluations ordered. [facility]- 20 , anticipate HHC/SNF at discharge. Patient aware and agreeable to current plan, continue plan as above.     I spent a total of 60 minutes on the date of the service which included preparing to see the patient, face-to-face patient care, completing clinical documentation, obtaining and/or reviewing separately obtained history, performing a medically appropriate examination, counseling and educating the patient/family/caregiver, ordering medications, tests,  or procedures, communicating with other HCPs (not separately reported), independently interpreting results (not separately reported), communicating results to the patient/family/caregiver, and care coordination (not separately reported).     Patient fully evaluated    for    Problem List Items Addressed This Visit       * (Principal) Urinary retention - Primary    Relevant Medications    cephalexin (Keflex) 500 mg capsule    Other Relevant Orders    Referral to Urology    Hematuria    Relevant Orders    Case Request Operating Room: CYSTOSCOPY, WITH BLADDER LESION ABLATION (Completed)     Patient seen resting in bed with head of bed elevated, no s/s or c/o acute difficulties at this time.  Vital signs for last 24 hours Temp:  [35.9 °C (96.6 °F)-36.5 °C (97.7 °F)] 36 °C (96.8 °F)  Heart Rate:  [50-73] 50  Resp:  [17-18] 18  BP: ()/(55-71) 111/60    I/O this shift:  In: -   Out: -3350   Patient still requiring frequent cardiac and SPO2 monitoring. Continue aggressive pulmonary hygiene and oral hygiene. Off loading as tolerated for skin integrity. Medications and labs reviewed-   Results for orders placed or performed during the hospital encounter of 04/30/25 (from the past 24 hours)   Comprehensive Metabolic Panel   Result Value Ref Range    Glucose 104 (H) 74 - 99 mg/dL    Sodium 135 (L) 136 - 145 mmol/L    Potassium 5.0 3.5 - 5.3 mmol/L    Chloride 104 98 - 107 mmol/L    Bicarbonate 25 21 - 32 mmol/L    Anion Gap 11 10 - 20 mmol/L    Urea Nitrogen 28 (H) 6 - 23 mg/dL    Creatinine 1.21 0.50 - 1.30 mg/dL    eGFR 57 (L) >60 mL/min/1.73m*2    Calcium 8.8 8.6 - 10.3 mg/dL    Albumin 3.6 3.4 - 5.0 g/dL    Alkaline Phosphatase 96 33 - 136 U/L    Total Protein 5.9 (L) 6.4 - 8.2 g/dL    AST 27 9 - 39 U/L    Bilirubin, Total 1.0 0.0 - 1.2 mg/dL    ALT 19 10 - 52 U/L   CBC and Auto Differential   Result Value Ref Range    WBC 6.3 4.4 - 11.3 x10*3/uL    nRBC 0.0 0.0 - 0.0 /100 WBCs    RBC 2.99 (L) 4.50 - 5.90 x10*6/uL     Hemoglobin 9.8 (L) 13.5 - 17.5 g/dL    Hematocrit 31.4 (L) 41.0 - 52.0 %     (H) 80 - 100 fL    MCH 32.8 26.0 - 34.0 pg    MCHC 31.2 (L) 32.0 - 36.0 g/dL    RDW 13.2 11.5 - 14.5 %    Platelets 192 150 - 450 x10*3/uL    Neutrophils % 64.0 40.0 - 80.0 %    Immature Granulocytes %, Automated 0.3 0.0 - 0.9 %    Lymphocytes % 21.4 13.0 - 44.0 %    Monocytes % 9.8 2.0 - 10.0 %    Eosinophils % 3.7 0.0 - 6.0 %    Basophils % 0.8 0.0 - 2.0 %    Neutrophils Absolute 4.00 1.60 - 5.50 x10*3/uL    Immature Granulocytes Absolute, Automated 0.02 0.00 - 0.50 x10*3/uL    Lymphocytes Absolute 1.34 0.80 - 3.00 x10*3/uL    Monocytes Absolute 0.61 0.05 - 0.80 x10*3/uL    Eosinophils Absolute 0.23 0.00 - 0.40 x10*3/uL    Basophils Absolute 0.05 0.00 - 0.10 x10*3/uL      Patient recently received an antibiotic (last 12 hours)       None           Plan discussed with interdisciplinary team, home medications to be resumed as clinically indicated, hemoglobin 9.8 today, continue to monitor for clots, hematuria improving but still present, will cystoscopy this afternoon, awaiting results. Will continue current antibiotics, monitor overnight, continue current and repeat labs in the AM.     Discharge planning discussed with patient and care team. Therapy evaluations ordered. [facility]-20 , anticipate C/SNF at discharge. Patient aware and agreeable to current plan, continue plan as above.     I spent a total of 60 minutes on the date of the service which included preparing to see the patient, face-to-face patient care, completing clinical documentation, obtaining and/or reviewing separately obtained history, performing a medically appropriate examination, counseling and educating the patient/family/caregiver, ordering medications, tests, or procedures, communicating with other HCPs (not separately reported), independently interpreting results (not separately reported), communicating results to the patient/family/caregiver, and care  coordination (not separately reported).   Sangeetha Dumont

## 2025-05-07 LAB
ALBUMIN SERPL BCP-MCNC: 3.6 G/DL (ref 3.4–5)
ALBUMIN SERPL BCP-MCNC: 3.7 G/DL (ref 3.4–5)
ALBUMIN SERPL BCP-MCNC: 3.9 G/DL (ref 3.4–5)
ALP SERPL-CCNC: 105 U/L (ref 33–136)
ALP SERPL-CCNC: 97 U/L (ref 33–136)
ALT SERPL W P-5'-P-CCNC: 19 U/L (ref 10–52)
ALT SERPL W P-5'-P-CCNC: 19 U/L (ref 10–52)
ANION GAP BLDA CALCULATED.4IONS-SCNC: 20 MMO/L (ref 10–25)
ANION GAP BLDA CALCULATED.4IONS-SCNC: 24 MMO/L (ref 10–25)
ANION GAP SERPL CALC-SCNC: 10 MMOL/L (ref 10–20)
ANION GAP SERPL CALC-SCNC: 16 MMOL/L (ref 10–20)
ANION GAP SERPL CALC-SCNC: 19 MMOL/L (ref 10–20)
APPARATUS: ABNORMAL
APTT PPP: 24 SECONDS (ref 26–36)
AST SERPL W P-5'-P-CCNC: 27 U/L (ref 9–39)
AST SERPL W P-5'-P-CCNC: 29 U/L (ref 9–39)
BASE EXCESS BLDA CALC-SCNC: -11.8 MMOL/L (ref -2–3)
BASE EXCESS BLDA CALC-SCNC: -15.1 MMOL/L (ref -2–3)
BASOPHILS # BLD AUTO: 0.05 X10*3/UL (ref 0–0.1)
BASOPHILS NFR BLD AUTO: 0.7 %
BILIRUB SERPL-MCNC: 0.8 MG/DL (ref 0–1.2)
BILIRUB SERPL-MCNC: 1.1 MG/DL (ref 0–1.2)
BODY TEMPERATURE: 37 DEGREES CELSIUS
BODY TEMPERATURE: 37 DEGREES CELSIUS
BUN SERPL-MCNC: 28 MG/DL (ref 6–23)
BUN SERPL-MCNC: 28 MG/DL (ref 6–23)
BUN SERPL-MCNC: 30 MG/DL (ref 6–23)
CA-I BLDA-SCNC: 1.1 MMOL/L (ref 1.1–1.33)
CA-I BLDA-SCNC: 1.1 MMOL/L (ref 1.1–1.33)
CALCIUM SERPL-MCNC: 8.8 MG/DL (ref 8.6–10.3)
CALCIUM SERPL-MCNC: 8.8 MG/DL (ref 8.6–10.3)
CALCIUM SERPL-MCNC: 9.2 MG/DL (ref 8.6–10.3)
CARDIAC TROPONIN I PNL SERPL HS: 100 NG/L (ref 0–20)
CARDIAC TROPONIN I PNL SERPL HS: 94 NG/L (ref 0–20)
CHLORIDE BLDA-SCNC: 105 MMOL/L (ref 98–107)
CHLORIDE BLDA-SCNC: 107 MMOL/L (ref 98–107)
CHLORIDE SERPL-SCNC: 104 MMOL/L (ref 98–107)
CO2 SERPL-SCNC: 15 MMOL/L (ref 21–32)
CO2 SERPL-SCNC: 20 MMOL/L (ref 21–32)
CO2 SERPL-SCNC: 26 MMOL/L (ref 21–32)
CREAT SERPL-MCNC: 1.08 MG/DL (ref 0.5–1.3)
CREAT SERPL-MCNC: 1.09 MG/DL (ref 0.5–1.3)
CREAT SERPL-MCNC: 1.23 MG/DL (ref 0.5–1.3)
CRITICAL CALL TIME: 1820
CRITICAL CALL TIME: 1956
CRITICAL CALLED BY: ABNORMAL
CRITICAL CALLED BY: ABNORMAL
CRITICAL CALLED TO: ABNORMAL
CRITICAL CALLED TO: ABNORMAL
CRITICAL READ BACK: ABNORMAL
CRITICAL READ BACK: ABNORMAL
EGFRCR SERPLBLD CKD-EPI 2021: 56 ML/MIN/1.73M*2
EGFRCR SERPLBLD CKD-EPI 2021: 64 ML/MIN/1.73M*2
EGFRCR SERPLBLD CKD-EPI 2021: 65 ML/MIN/1.73M*2
EOSINOPHIL # BLD AUTO: 0.22 X10*3/UL (ref 0–0.4)
EOSINOPHIL NFR BLD AUTO: 3.2 %
ERYTHROCYTE [DISTWIDTH] IN BLOOD BY AUTOMATED COUNT: 13.2 % (ref 11.5–14.5)
ERYTHROCYTE [DISTWIDTH] IN BLOOD BY AUTOMATED COUNT: 13.2 % (ref 11.5–14.5)
FLOW: 4 LPM
GLUCOSE BLD MANUAL STRIP-MCNC: 149 MG/DL (ref 74–99)
GLUCOSE BLDA-MCNC: 205 MG/DL (ref 74–99)
GLUCOSE BLDA-MCNC: 223 MG/DL (ref 74–99)
GLUCOSE SERPL-MCNC: 116 MG/DL (ref 74–99)
GLUCOSE SERPL-MCNC: 151 MG/DL (ref 74–99)
GLUCOSE SERPL-MCNC: 215 MG/DL (ref 74–99)
HCO3 BLDA-SCNC: 11.9 MMOL/L (ref 22–26)
HCO3 BLDA-SCNC: 9.3 MMOL/L (ref 22–26)
HCT VFR BLD AUTO: 33 % (ref 41–52)
HCT VFR BLD AUTO: 33.2 % (ref 41–52)
HCT VFR BLD EST: 31 % (ref 41–52)
HCT VFR BLD EST: 32 % (ref 41–52)
HGB BLD-MCNC: 10.4 G/DL (ref 13.5–17.5)
HGB BLD-MCNC: 10.5 G/DL (ref 13.5–17.5)
HGB BLDA-MCNC: 10.4 G/DL (ref 13.5–17.5)
HGB BLDA-MCNC: 10.8 G/DL (ref 13.5–17.5)
IMM GRANULOCYTES # BLD AUTO: 0.03 X10*3/UL (ref 0–0.5)
IMM GRANULOCYTES NFR BLD AUTO: 0.4 % (ref 0–0.9)
INHALED O2 CONCENTRATION: 36 %
INHALED O2 CONCENTRATION: 36 %
INR PPP: 1.2 (ref 0.9–1.1)
LACTATE BLDA-SCNC: 6.6 MMOL/L (ref 0.4–2)
LACTATE BLDA-SCNC: 9.6 MMOL/L (ref 0.4–2)
LACTATE SERPL-SCNC: 3 MMOL/L (ref 0.4–2)
LACTATE SERPL-SCNC: 5.9 MMOL/L (ref 0.4–2)
LYMPHOCYTES # BLD AUTO: 1.22 X10*3/UL (ref 0.8–3)
LYMPHOCYTES NFR BLD AUTO: 17.5 %
MCH RBC QN AUTO: 33.1 PG (ref 26–34)
MCH RBC QN AUTO: 33.4 PG (ref 26–34)
MCHC RBC AUTO-ENTMCNC: 31.3 G/DL (ref 32–36)
MCHC RBC AUTO-ENTMCNC: 31.8 G/DL (ref 32–36)
MCV RBC AUTO: 105 FL (ref 80–100)
MCV RBC AUTO: 106 FL (ref 80–100)
MONOCYTES # BLD AUTO: 0.61 X10*3/UL (ref 0.05–0.8)
MONOCYTES NFR BLD AUTO: 8.7 %
NEUTROPHILS # BLD AUTO: 4.85 X10*3/UL (ref 1.6–5.5)
NEUTROPHILS NFR BLD AUTO: 69.5 %
NRBC BLD-RTO: 0 /100 WBCS (ref 0–0)
NRBC BLD-RTO: 0 /100 WBCS (ref 0–0)
OXYHGB MFR BLDA: 97.1 % (ref 94–98)
OXYHGB MFR BLDA: 97.7 % (ref 94–98)
PCO2 BLDA: 19 MM HG (ref 38–42)
PCO2 BLDA: 21 MM HG (ref 38–42)
PH BLDA: 7.3 PH (ref 7.38–7.42)
PH BLDA: 7.36 PH (ref 7.38–7.42)
PHOSPHATE SERPL-MCNC: 3.7 MG/DL (ref 2.5–4.9)
PLATELET # BLD AUTO: 194 X10*3/UL (ref 150–450)
PLATELET # BLD AUTO: 219 X10*3/UL (ref 150–450)
PO2 BLDA: 127 MM HG (ref 85–95)
PO2 BLDA: 142 MM HG (ref 85–95)
POTASSIUM BLDA-SCNC: 4.8 MMOL/L (ref 3.5–5.3)
POTASSIUM BLDA-SCNC: 4.9 MMOL/L (ref 3.5–5.3)
POTASSIUM SERPL-SCNC: 4.7 MMOL/L (ref 3.5–5.3)
POTASSIUM SERPL-SCNC: 4.8 MMOL/L (ref 3.5–5.3)
POTASSIUM SERPL-SCNC: 4.9 MMOL/L (ref 3.5–5.3)
PROT SERPL-MCNC: 5.9 G/DL (ref 6.4–8.2)
PROT SERPL-MCNC: 6.3 G/DL (ref 6.4–8.2)
PROTHROMBIN TIME: 13.5 SECONDS (ref 9.8–12.4)
RBC # BLD AUTO: 3.14 X10*6/UL (ref 4.5–5.9)
RBC # BLD AUTO: 3.14 X10*6/UL (ref 4.5–5.9)
SAO2 % BLDA: 100 % (ref 94–100)
SAO2 % BLDA: 99 % (ref 94–100)
SODIUM BLDA-SCNC: 133 MMOL/L (ref 136–145)
SODIUM BLDA-SCNC: 134 MMOL/L (ref 136–145)
SODIUM SERPL-SCNC: 133 MMOL/L (ref 136–145)
SODIUM SERPL-SCNC: 135 MMOL/L (ref 136–145)
SODIUM SERPL-SCNC: 135 MMOL/L (ref 136–145)
SPECIMEN DRAWN FROM PATIENT: ABNORMAL
UFH PPP CHRO-ACNC: 1 IU/ML (ref ?–1.1)
WBC # BLD AUTO: 7 X10*3/UL (ref 4.4–11.3)
WBC # BLD AUTO: 9.5 X10*3/UL (ref 4.4–11.3)

## 2025-05-07 PROCEDURE — 2500000004 HC RX 250 GENERAL PHARMACY W/ HCPCS (ALT 636 FOR OP/ED): Mod: JZ

## 2025-05-07 PROCEDURE — 84484 ASSAY OF TROPONIN QUANT: CPT | Performed by: STUDENT IN AN ORGANIZED HEALTH CARE EDUCATION/TRAINING PROGRAM

## 2025-05-07 PROCEDURE — 82435 ASSAY OF BLOOD CHLORIDE: CPT | Performed by: INTERNAL MEDICINE

## 2025-05-07 PROCEDURE — 85025 COMPLETE CBC W/AUTO DIFF WBC: CPT | Performed by: INTERNAL MEDICINE

## 2025-05-07 PROCEDURE — 84484 ASSAY OF TROPONIN QUANT: CPT

## 2025-05-07 PROCEDURE — 85027 COMPLETE CBC AUTOMATED: CPT

## 2025-05-07 PROCEDURE — 94640 AIRWAY INHALATION TREATMENT: CPT

## 2025-05-07 PROCEDURE — 80053 COMPREHEN METABOLIC PANEL: CPT | Performed by: INTERNAL MEDICINE

## 2025-05-07 PROCEDURE — 2500000001 HC RX 250 WO HCPCS SELF ADMINISTERED DRUGS (ALT 637 FOR MEDICARE OP): Performed by: NURSE PRACTITIONER

## 2025-05-07 PROCEDURE — 85610 PROTHROMBIN TIME: CPT | Performed by: STUDENT IN AN ORGANIZED HEALTH CARE EDUCATION/TRAINING PROGRAM

## 2025-05-07 PROCEDURE — 83605 ASSAY OF LACTIC ACID: CPT | Performed by: STUDENT IN AN ORGANIZED HEALTH CARE EDUCATION/TRAINING PROGRAM

## 2025-05-07 PROCEDURE — 80069 RENAL FUNCTION PANEL: CPT | Mod: CCI | Performed by: STUDENT IN AN ORGANIZED HEALTH CARE EDUCATION/TRAINING PROGRAM

## 2025-05-07 PROCEDURE — 85730 THROMBOPLASTIN TIME PARTIAL: CPT | Performed by: STUDENT IN AN ORGANIZED HEALTH CARE EDUCATION/TRAINING PROGRAM

## 2025-05-07 PROCEDURE — 85520 HEPARIN ASSAY: CPT | Performed by: STUDENT IN AN ORGANIZED HEALTH CARE EDUCATION/TRAINING PROGRAM

## 2025-05-07 PROCEDURE — 2500000001 HC RX 250 WO HCPCS SELF ADMINISTERED DRUGS (ALT 637 FOR MEDICARE OP): Performed by: INTERNAL MEDICINE

## 2025-05-07 PROCEDURE — 36415 COLL VENOUS BLD VENIPUNCTURE: CPT | Performed by: INTERNAL MEDICINE

## 2025-05-07 PROCEDURE — 36620 INSERTION CATHETER ARTERY: CPT | Mod: GC

## 2025-05-07 PROCEDURE — 05HY33Z INSERTION OF INFUSION DEVICE INTO UPPER VEIN, PERCUTANEOUS APPROACH: ICD-10-PCS

## 2025-05-07 PROCEDURE — 99291 CRITICAL CARE FIRST HOUR: CPT

## 2025-05-07 PROCEDURE — 36556 INSERT NON-TUNNEL CV CATH: CPT | Mod: GC

## 2025-05-07 PROCEDURE — 93010 ELECTROCARDIOGRAM REPORT: CPT | Performed by: STUDENT IN AN ORGANIZED HEALTH CARE EDUCATION/TRAINING PROGRAM

## 2025-05-07 PROCEDURE — 99233 SBSQ HOSP IP/OBS HIGH 50: CPT

## 2025-05-07 PROCEDURE — 82947 ASSAY GLUCOSE BLOOD QUANT: CPT

## 2025-05-07 PROCEDURE — 84075 ASSAY ALKALINE PHOSPHATASE: CPT

## 2025-05-07 PROCEDURE — 2500000004 HC RX 250 GENERAL PHARMACY W/ HCPCS (ALT 636 FOR OP/ED): Mod: JZ | Performed by: STUDENT IN AN ORGANIZED HEALTH CARE EDUCATION/TRAINING PROGRAM

## 2025-05-07 PROCEDURE — 83605 ASSAY OF LACTIC ACID: CPT

## 2025-05-07 PROCEDURE — 71045 X-RAY EXAM CHEST 1 VIEW: CPT | Performed by: INTERNAL MEDICINE

## 2025-05-07 PROCEDURE — 2500000005 HC RX 250 GENERAL PHARMACY W/O HCPCS: Performed by: INTERNAL MEDICINE

## 2025-05-07 PROCEDURE — 2500000004 HC RX 250 GENERAL PHARMACY W/ HCPCS (ALT 636 FOR OP/ED): Mod: JZ | Performed by: INTERNAL MEDICINE

## 2025-05-07 PROCEDURE — 2500000001 HC RX 250 WO HCPCS SELF ADMINISTERED DRUGS (ALT 637 FOR MEDICARE OP)

## 2025-05-07 PROCEDURE — 76937 US GUIDE VASCULAR ACCESS: CPT

## 2025-05-07 PROCEDURE — 2500000001 HC RX 250 WO HCPCS SELF ADMINISTERED DRUGS (ALT 637 FOR MEDICARE OP): Performed by: PHYSICIAN ASSISTANT

## 2025-05-07 PROCEDURE — 2020000001 HC ICU ROOM DAILY

## 2025-05-07 PROCEDURE — 36415 COLL VENOUS BLD VENIPUNCTURE: CPT

## 2025-05-07 RX ORDER — NOREPINEPHRINE BITARTRATE 0.03 MG/ML
.01-2 INJECTION, SOLUTION INTRAVENOUS CONTINUOUS
Status: DISCONTINUED | OUTPATIENT
Start: 2025-05-07 | End: 2025-05-08

## 2025-05-07 RX ORDER — SODIUM BICARBONATE 1 MEQ/ML
50 SYRINGE (ML) INTRAVENOUS ONCE
Status: COMPLETED | OUTPATIENT
Start: 2025-05-07 | End: 2025-05-07

## 2025-05-07 RX ORDER — VANCOMYCIN HYDROCHLORIDE 1 G/200ML
1000 INJECTION, SOLUTION INTRAVENOUS ONCE
Status: COMPLETED | OUTPATIENT
Start: 2025-05-07 | End: 2025-05-07

## 2025-05-07 RX ORDER — NOREPINEPHRINE BITARTRATE/D5W 8 MG/250ML
.01-1 PLASTIC BAG, INJECTION (ML) INTRAVENOUS CONTINUOUS
Status: DISCONTINUED | OUTPATIENT
Start: 2025-05-07 | End: 2025-05-07

## 2025-05-07 RX ORDER — HALOPERIDOL LACTATE 5 MG/ML
2.5 INJECTION, SOLUTION INTRAMUSCULAR ONCE
Status: DISCONTINUED | OUTPATIENT
Start: 2025-05-07 | End: 2025-05-07

## 2025-05-07 RX ORDER — LIDOCAINE HYDROCHLORIDE 10 MG/ML
100 INJECTION, SOLUTION EPIDURAL; INFILTRATION; INTRACAUDAL; PERINEURAL ONCE
Status: COMPLETED | OUTPATIENT
Start: 2025-05-07 | End: 2025-05-07

## 2025-05-07 RX ORDER — VANCOMYCIN HYDROCHLORIDE 1 G/200ML
1000 INJECTION, SOLUTION INTRAVENOUS EVERY 24 HOURS
Status: DISCONTINUED | OUTPATIENT
Start: 2025-05-08 | End: 2025-05-08 | Stop reason: HOSPADM

## 2025-05-07 RX ORDER — NITROGLYCERIN 0.4 MG/1
0.4 TABLET SUBLINGUAL EVERY 5 MIN PRN
Status: CANCELLED | OUTPATIENT
Start: 2025-05-07

## 2025-05-07 RX ORDER — HYDROMORPHONE HYDROCHLORIDE 1 MG/ML
0.6 INJECTION, SOLUTION INTRAMUSCULAR; INTRAVENOUS; SUBCUTANEOUS
Status: DISCONTINUED | OUTPATIENT
Start: 2025-05-07 | End: 2025-05-08 | Stop reason: HOSPADM

## 2025-05-07 RX ORDER — MAGNESIUM SULFATE HEPTAHYDRATE 40 MG/ML
INJECTION, SOLUTION INTRAVENOUS
Status: COMPLETED
Start: 2025-05-07 | End: 2025-05-07

## 2025-05-07 RX ORDER — VANCOMYCIN HYDROCHLORIDE 1 G/20ML
INJECTION, POWDER, LYOPHILIZED, FOR SOLUTION INTRAVENOUS DAILY PRN
Status: DISCONTINUED | OUTPATIENT
Start: 2025-05-07 | End: 2025-05-08 | Stop reason: HOSPADM

## 2025-05-07 RX ORDER — ACETAMINOPHEN 325 MG/1
650 TABLET ORAL EVERY 6 HOURS
Status: DISCONTINUED | OUTPATIENT
Start: 2025-05-07 | End: 2025-05-08 | Stop reason: HOSPADM

## 2025-05-07 RX ORDER — MAGNESIUM SULFATE HEPTAHYDRATE 40 MG/ML
2 INJECTION, SOLUTION INTRAVENOUS ONCE
Status: COMPLETED | OUTPATIENT
Start: 2025-05-07 | End: 2025-05-07

## 2025-05-07 RX ORDER — NAPROXEN SODIUM 220 MG/1
325 TABLET, FILM COATED ORAL DAILY
Status: DISCONTINUED | OUTPATIENT
Start: 2025-05-07 | End: 2025-05-08 | Stop reason: HOSPADM

## 2025-05-07 RX ORDER — HALOPERIDOL LACTATE 5 MG/ML
INJECTION, SOLUTION INTRAMUSCULAR
Status: COMPLETED
Start: 2025-05-07 | End: 2025-05-07

## 2025-05-07 RX ORDER — EPINEPHRINE IN 0.9 % SOD CHLOR 4MG/250ML
0-1 PLASTIC BAG, INJECTION (ML) INTRAVENOUS CONTINUOUS
Status: DISCONTINUED | OUTPATIENT
Start: 2025-05-07 | End: 2025-05-07

## 2025-05-07 RX ORDER — EPINEPHRINE 1 MG/ML
0-1 INJECTION INTRAMUSCULAR; INTRAVENOUS; SUBCUTANEOUS CONTINUOUS
Status: DISCONTINUED | OUTPATIENT
Start: 2025-05-08 | End: 2025-05-08

## 2025-05-07 RX ORDER — HEPARIN SODIUM 10000 [USP'U]/100ML
0-4000 INJECTION, SOLUTION INTRAVENOUS CONTINUOUS
Status: DISCONTINUED | OUTPATIENT
Start: 2025-05-07 | End: 2025-05-08 | Stop reason: HOSPADM

## 2025-05-07 RX ADMIN — EPINEPHRINE IN SODIUM CHLORIDE 0.47 MCG/KG/MIN: 16 INJECTION INTRAVENOUS at 23:30

## 2025-05-07 RX ADMIN — ACETAMINOPHEN 650 MG: 325 TABLET, FILM COATED ORAL at 15:18

## 2025-05-07 RX ADMIN — HALOPERIDOL LACTATE 2.5 MG: 5 INJECTION, SOLUTION INTRAMUSCULAR at 17:05

## 2025-05-07 RX ADMIN — LIDOCAINE HYDROCHLORIDE 100 MG: 10 INJECTION, SOLUTION EPIDURAL; INFILTRATION; INTRACAUDAL; PERINEURAL at 18:12

## 2025-05-07 RX ADMIN — ASPIRIN 81 MG CHEWABLE TABLET 324 MG: 81 TABLET CHEWABLE at 17:15

## 2025-05-07 RX ADMIN — VASOPRESSIN 0.03 UNITS/MIN: 0.2 INJECTION INTRAVENOUS at 21:34

## 2025-05-07 RX ADMIN — HEPARIN SODIUM 800 UNITS/HR: 10000 INJECTION, SOLUTION INTRAVENOUS at 18:22

## 2025-05-07 RX ADMIN — MAGNESIUM SULFATE HEPTAHYDRATE 2 G: 40 INJECTION, SOLUTION INTRAVENOUS at 17:45

## 2025-05-07 RX ADMIN — TIOTROPIUM BROMIDE INHALATION SPRAY 2 PUFF: 3.12 SPRAY, METERED RESPIRATORY (INHALATION) at 07:21

## 2025-05-07 RX ADMIN — ATORVASTATIN CALCIUM 40 MG: 40 TABLET, FILM COATED ORAL at 09:50

## 2025-05-07 RX ADMIN — NOREPINEPHRINE BITARTRATE 0.08 MCG/KG/MIN: 32 SOLUTION INTRAVENOUS at 17:15

## 2025-05-07 RX ADMIN — EPINEPHRINE IN SODIUM CHLORIDE 0.36 MCG/KG/MIN: 16 INJECTION INTRAVENOUS at 21:10

## 2025-05-07 RX ADMIN — SODIUM CHLORIDE, SODIUM LACTATE, POTASSIUM CHLORIDE, AND CALCIUM CHLORIDE 500 ML: .6; .31; .03; .02 INJECTION, SOLUTION INTRAVENOUS at 20:50

## 2025-05-07 RX ADMIN — ACETAMINOPHEN 650 MG: 325 TABLET, FILM COATED ORAL at 21:15

## 2025-05-07 RX ADMIN — EPINEPHRINE IN SODIUM CHLORIDE 0.02 MCG/KG/MIN: 16 INJECTION INTRAVENOUS at 18:00

## 2025-05-07 RX ADMIN — VANCOMYCIN HYDROCHLORIDE 1000 MG: 1 INJECTION, SOLUTION INTRAVENOUS at 21:45

## 2025-05-07 RX ADMIN — FINASTERIDE 5 MG: 5 TABLET, FILM COATED ORAL at 09:50

## 2025-05-07 RX ADMIN — SODIUM BICARBONATE 50 MEQ: 84 INJECTION INTRAVENOUS at 20:09

## 2025-05-07 RX ADMIN — FLUTICASONE FUROATE AND VILANTEROL TRIFENATATE 1 PUFF: 200; 25 POWDER RESPIRATORY (INHALATION) at 07:22

## 2025-05-07 RX ADMIN — MAGNESIUM SULFATE IN WATER 2 G: 40 INJECTION, SOLUTION INTRAVENOUS at 17:45

## 2025-05-07 RX ADMIN — PIPERACILLIN SODIUM AND TAZOBACTAM SODIUM 3.38 G: 3; .375 INJECTION, SOLUTION INTRAVENOUS at 20:51

## 2025-05-07 RX ADMIN — CEFTRIAXONE SODIUM 1 G: 1 INJECTION, SOLUTION INTRAVENOUS at 18:32

## 2025-05-07 RX ADMIN — SODIUM CHLORIDE, POTASSIUM CHLORIDE, SODIUM LACTATE AND CALCIUM CHLORIDE 500 ML: 600; 310; 30; 20 INJECTION, SOLUTION INTRAVENOUS at 17:45

## 2025-05-07 ASSESSMENT — PAIN - FUNCTIONAL ASSESSMENT
PAIN_FUNCTIONAL_ASSESSMENT: 0-10
PAIN_FUNCTIONAL_ASSESSMENT: 0-10
PAIN_FUNCTIONAL_ASSESSMENT: CPOT (CRITICAL CARE PAIN OBSERVATION TOOL)
PAIN_FUNCTIONAL_ASSESSMENT: 0-10

## 2025-05-07 ASSESSMENT — PAIN DESCRIPTION - LOCATION: LOCATION: HIP

## 2025-05-07 ASSESSMENT — PAIN SCALES - GENERAL
PAINLEVEL_OUTOF10: 3
PAINLEVEL_OUTOF10: 4
PAINLEVEL_OUTOF10: 0 - NO PAIN

## 2025-05-07 ASSESSMENT — PAIN DESCRIPTION - DESCRIPTORS: DESCRIPTORS: ACHING

## 2025-05-07 ASSESSMENT — PAIN DESCRIPTION - ORIENTATION: ORIENTATION: RIGHT

## 2025-05-07 NOTE — PROCEDURES
Central Line    Date/Time: 5/7/2025 6:29 PM    Performed by: Harshal Webb DO  Authorized by: Bryon Stubbs DO    Consent:     Consent obtained:  Verbal    Consent given by:  Patient    Risks, benefits, and alternatives were discussed: yes      Risks discussed:  Arterial puncture and bleeding  Universal protocol:     Procedure explained and questions answered to patient or proxy's satisfaction: yes      Site/side marked: yes      Patient identity confirmed:  Verbally with patient  Pre-procedure details:     Indication(s): central venous access and insufficient peripheral access      Skin preparation:  Chlorhexidine  Sedation:     Sedation type:  None  Anesthesia:     Anesthesia method:  Local infiltration    Local anesthetic:  Lidocaine 1% w/o epi  Procedure details:     Location:  L femoral    Patient position:  Supine    Procedural supplies:  Triple lumen    Catheter size:  7 Fr    Catheter length:  20    Landmarks identified: yes      Ultrasound guidance: yes      Ultrasound guidance timing: real time      Sterile ultrasound techniques: Sterile gel and sterile probe covers were used      Number of attempts:  1  Post-procedure details:     Post-procedure:  Dressing applied and line sutured    Assessment:  Blood return through all ports    Procedure completion:  Tolerated

## 2025-05-07 NOTE — CARE PLAN
The patient's goals for the shift include      The clinical goals for the shift include Manage CBI/urine will remain free of clots    NPO after midnight reinforced  Problem: Pain - Adult  Goal: Verbalizes/displays adequate comfort level or baseline comfort level  Outcome: Progressing     Problem: Safety - Adult  Goal: Free from fall injury  Outcome: Progressing     Problem: Nutrition  Goal: Nutrient intake appropriate for maintaining nutritional needs  Outcome: Progressing     Problem: Fall/Injury  Goal: Not fall by end of shift  Outcome: Progressing  Goal: Be free from injury by end of the shift  Outcome: Progressing  Goal: Verbalize understanding of personal risk factors for fall in the hospital  Outcome: Progressing  Goal: Verbalize understanding of risk factor reduction measures to prevent injury from fall in the home  Outcome: Progressing  Goal: Use assistive devices by end of the shift  Outcome: Progressing  Goal: Pace activities to prevent fatigue by end of the shift  Outcome: Progressing     Problem: Skin  Goal: Promote skin healing  Outcome: Progressing

## 2025-05-07 NOTE — PROGRESS NOTES
05/07/25 1310   Discharge Planning   Living Arrangements Spouse/significant other   Support Systems Spouse/significant other;Children   Assistance Needed Independent, uses cane as needed   Type of Residence Private residence   Home or Post Acute Services None   Expected Discharge Disposition Home   Does the patient need discharge transport arranged? No   Intensity of Service   Intensity of Service 0-30 min     Met with patient at bedside to follow up on discharge plan. Address, insurance and contact information verified with patient. Patient lives at home with his partner Kallie. Patient plans to return home at discharge and has declined any home going needs. Patient states his son or daughter live local and can transport him home at discharge.    PCP: Dr. Sean Godwin

## 2025-05-07 NOTE — CONSULTS
Vancomycin Dosing by Pharmacy- INITIAL    Patrick Vázquez is a 89 y.o. year old male who Pharmacy has been consulted for vancomycin dosing for urinary tract infection. Based on the patient's indication and renal status this patient will be dosed based on a goal AUC of 400-600.     Renal function is currently declining.    Visit Vitals  BP 80/53   Pulse 93   Temp 36.1 °C (97 °F)   Resp 22        Lab Results   Component Value Date    CREATININE 1.23 2025    CREATININE 1.08 2025    CREATININE 1.09 2025    CREATININE 1.21 2025        Patient weight is as follows:   Vitals:    25 0108   Weight: 68 kg (150 lb)       Cultures:  No results found for the encounter in last 14 days.        I/O last 3 completed shifts:  In: 718 (10.6 mL/kg) [I.V.:118 (1.7 mL/kg); IV Piggyback:600]  Out: -8400 (-123.5 mL/kg)   Weight: 68 kg   I/O during current shift:  I/O this shift:  In: 50 [IV Piggyback:50]  Out: -     Temp (24hrs), Av.8 °C (96.5 °F), Min:35.5 °C (95.9 °F), Max:36.1 °C (97 °F)         Assessment/Plan     Patient will be given a loading dose of 1000 mg.  Will initiate vancomycin maintenance, 1000 mg every 24 hours.    This dosing regimen is predicted by [facility] to result in the following pharmacokinetic parameters:    Loading dose: 1000 mg at 21:00 2025.  Regimen: 1000 mg IV every 24 hours.  Start time: 11:00 on 2025  Exposure target: AUC24 (range)400-600 mg/L.hr   [id number]: 410 mg/L.hr  AUC24,ss: 529 mg/L.hr  Probability of AUC24 > 400: 80 %  Ctrough,ss: 16.9 mg/L  Probability of Ctrough,ss > 20: 34 %      Follow-up level will be ordered on 25 at 0500 unless clinically indicated sooner.  Will continue to monitor renal function daily while on vancomycin and order serum creatinine at least every 48 hours if not already ordered.  Follow for continued vancomycin needs, clinical response, and signs/symptoms of toxicity.       BAKARI CAST, KerwinD

## 2025-05-07 NOTE — NURSING NOTE
Central Line Insertion Practices/2nd Observer Note   Name:  Patrick Vázquez  Admission Date:  2025  8:16 PM  MRN: [id number]  Attending Provider: Gordo Stevens MD  Room/Bed:  123/[facility]             Sex: male  : 1935       Age: 89 y.o.    Pre-Procedure Checklist  Emergent Line Insertion: Yes  Type of Line to be Placed: CVC  Consent Obtained: Yes  Emergency Medication Necessary: No  Patient Identified with 2 Independent Identifiers: Yes  Review of Allergies, Anticoagulation, Relevant Labs, ECG/Telemetry: Yes  Risks/Benefits/Alternatives Discussed with Patient/POA/Legal Representative: Yes  Stop Sign on Door: Yes  Time Out Performed: Yes  Catheter Exchange: Yes  Positioning and Preparation Checklist  All People, Including Patient, in the Room with Cap and Mask: Yes  Fluoroscopy Used to Identify Vessel and Guide Insertion; Sterile Cover Used: Yes  Ultrasound Used to Identify Vessel and Guide Insertion; Sterile Cover Used: Yes  Full Barrier Precautions Followed (Mask, Cap, Gown, Gloves): Yes  Hands Washed: Yes  Monitors Attached with Sound Alarms On: Yes  Full Body Sterile Drape (Head-to-Toe) Used to Cover Patient: Yes  [place] Position (For IJ and Subclavian): Yes  CHG Skin Prep Used and Allowed to Air Dry Prior to Skin Procedure: Yes  Procedure Checklist  Blood Aspirated From All Lumens, All Ports Subsequently Flushed: Yes  Catheter Caps Placed On All Lumens; Lumens Clamped: Yes  Maintain Guidewire Control Throughout, Ensuring Guidewire Removal: Yes  Maintain Sterile Field Throughout Insertion: Yes  Catheter Secured: Yes  Confirmatory Test of Venous Placement: Longitudinal ultrasound  Post-Procedure Checklist  Date and Time Written on Dressing: Yes  Sharp and Wire Count and Safe Disposal of all Sharps/Wires: Yes  Sterile Dressing Applied Per Protocol: Yes  X-ray Ordered or ECG Image: No (n/a fem line)    Krishan Thompson RN  Date: 2025  Time: 6:59 PM

## 2025-05-07 NOTE — PROCEDURES
Insert arterial line    Date/Time: 5/7/2025 6:32 PM    Performed by: Harshal Webb DO  Authorized by: Bryon Stubbs DO    Consent:     Consent obtained:  Verbal    Consent given by:  Patient    Risks discussed:  Bleeding and ischemia  Universal protocol:     Patient identity confirmed:  Verbally with patient  Indications:     Indications: hemodynamic monitoring and multiple ABGs    Pre-procedure details:     Skin preparation:  Chlorhexidine  Sedation:     Sedation type:  None  Anesthesia:     Anesthesia method:  Local infiltration    Local anesthetic:  Lidocaine 1% w/o epi  Procedure details:     Location:  L radial    Needle gauge:  18 G    Placement technique:  Seldinger and ultrasound guided    Number of attempts:  1    Transducer: waveform confirmed    Post-procedure details:     Post-procedure:  Biopatch applied, secured with tape, sterile dressing applied and sutured    CMS:  Unchanged    Procedure completion:  Tolerated

## 2025-05-07 NOTE — PROGRESS NOTES
"Patrick Vázquez is a 89 y.o. male on day 6 of admission presenting with Urinary retention.    Subjective   F/U Hematuria   Zarco to CBI urine clear  He had to have manual irrigation over the past several nights to remove clots. He is 89 years old and was originally scheduled for outpatient procedure. The patient is concerned about post operatively getting anesthesia and have this scheduled as an outpatient. He has a history of prostate cancer and radiation.     Objective     Physical Exam    Last Recorded Vitals  Blood pressure 99/58, pulse 78, temperature 36 °C (96.8 °F), temperature source Temporal, resp. rate 16, height 1.727 m (5' 8\"), weight 68 kg (150 lb), SpO2 96%.  Intake/Output last 3 Shifts:  I/O last 3 completed shifts:  In: 100 (1.4 mL/kg) [IV Piggyback:100]  Out: -7300 (-103.8 mL/kg)   Dosing Weight: 70.3 kg     Relevant Results  Scheduled medications  Scheduled Medications[1]  Continuous medications  Continuous Medications[2]  PRN medications  PRN Medications[3]    Results for orders placed or performed during the hospital encounter of 04/30/25 (from the past 24 hours)   Comprehensive Metabolic Panel   Result Value Ref Range    Glucose 116 (H) 74 - 99 mg/dL    Sodium 135 (L) 136 - 145 mmol/L    Potassium 4.9 3.5 - 5.3 mmol/L    Chloride 104 98 - 107 mmol/L    Bicarbonate 26 21 - 32 mmol/L    Anion Gap 10 10 - 20 mmol/L    Urea Nitrogen 28 (H) 6 - 23 mg/dL    Creatinine 1.09 0.50 - 1.30 mg/dL    eGFR 64 >60 mL/min/1.73m*2    Calcium 8.8 8.6 - 10.3 mg/dL    Albumin 3.7 3.4 - 5.0 g/dL    Alkaline Phosphatase 97 33 - 136 U/L    Total Protein 5.9 (L) 6.4 - 8.2 g/dL    AST 27 9 - 39 U/L    Bilirubin, Total 0.8 0.0 - 1.2 mg/dL    ALT 19 10 - 52 U/L   CBC and Auto Differential   Result Value Ref Range    WBC 7.0 4.4 - 11.3 x10*3/uL    nRBC 0.0 0.0 - 0.0 /100 WBCs    RBC 3.14 (L) 4.50 - 5.90 x10*6/uL    Hemoglobin 10.5 (L) 13.5 - 17.5 g/dL    Hematocrit 33.0 (L) 41.0 - 52.0 %     (H) 80 - 100 fL    MCH " 33.4 26.0 - 34.0 pg    MCHC 31.8 (L) 32.0 - 36.0 g/dL    RDW 13.2 11.5 - 14.5 %    Platelets 194 150 - 450 x10*3/uL    Neutrophils % 69.5 40.0 - 80.0 %    Immature Granulocytes %, Automated 0.4 0.0 - 0.9 %    Lymphocytes % 17.5 13.0 - 44.0 %    Monocytes % 8.7 2.0 - 10.0 %    Eosinophils % 3.2 0.0 - 6.0 %    Basophils % 0.7 0.0 - 2.0 %    Neutrophils Absolute 4.85 1.60 - 5.50 x10*3/uL    Immature Granulocytes Absolute, Automated 0.03 0.00 - 0.50 x10*3/uL    Lymphocytes Absolute 1.22 0.80 - 3.00 x10*3/uL    Monocytes Absolute 0.61 0.05 - 0.80 x10*3/uL    Eosinophils Absolute 0.22 0.00 - 0.40 x10*3/uL    Basophils Absolute 0.05 0.00 - 0.10 x10*3/uL       Imaging  US bladder  Result Date: 5/6/2025  No focal urinary bladder abnormality identified.   Bladder empties completely via the Zarco catheter       MACRO: None.   Signed by: Perry Coughlin 5/6/2025 1:18 PM Dictation workstation:   [id number]    CT abdomen pelvis wo IV contrast  Result Date: 5/3/2025  No acute new process in the urinary tract or anywhere else in the abdomen or pelvis when compared to 30 January 2025   Same two tiny nonobstructing right renal stones, one at each pole   No new stone anywhere in the urinary tract   No hydroureteronephrosis on either side, only a large but simple parapelvic cysts at the right renal hilus, a common artifactual mimic for hydronephrosis   Urinary bladder completely decompressed by well-positioned Zarco catheter   Diffuse at least mild edema (subcutaneous, mesenteric, and trace deep pelvic free fluid)   New small bilateral pleural effusions   MACRO: None   Signed by: Raheem Cummins 5/3/2025 8:17 AM Dictation workstation:   [id number]      Cardiology, Vascular, and Other Imaging  ECG 12 lead  Result Date: 5/5/2025  Atrial fibrillation with slow ventricular response Rightward axis Nonspecific intraventricular block Abnormal ECG When compared with ECG of 02-MAY-2025 16:48, (unconfirmed) No significant change was  found             This patient currently has cardiac telemetry ordered; if you would like to modify or discontinue the telemetry order, click here to go to the orders activity to modify/discontinue the order.                 Assessment & Plan  Urinary retention    DOYLE (acute kidney injury)    Hematuria    Anemia    F/U hematuria and urinary retention  -Stop CBI and cap the irrigation port  -Per attending no surgery is needed at this time.  -Recommend maintaining the paz catheter and F/U as outpatient        I spent 20 minutes in the professional and overall care of this patient.      Chaitanya Mckeon PA-C           [1] atorvastatin, 40 mg, oral, Daily  cefTRIAXone, 1 g, intravenous, q24h  finasteride, 5 mg, oral, Daily  tiotropium, 2 puff, inhalation, Daily   And  fluticasone furoate-vilanteroL, 1 puff, inhalation, Daily  [Held by provider] furosemide, 20 mg, oral, Every other day  morphine, 2 mg, intravenous, Once  [Held by provider] spironolactone, 12.5 mg, oral, Daily    [2]    [3] PRN medications: acetaminophen, albuterol, hyoscyamine, traMADol **OR** traMADol

## 2025-05-07 NOTE — H&P
ICU HPI Note          PATIENT NAME: Patrick Vázquez  MRN: [id number]    SERVICE DATE:  5/7/2025  SERVICE TIME:  6:46 PM    Patrick Vázquez is a 89 y.o. male on day 6 of admission presenting with Urinary retention.      HPI  Patient is a 89-year-old male with medical history significant for prostate cancer, hypertension, heart failure, COPD, diverticulosis, NSTEMI in 2022 with stent placement, mitral regurgitation status post MitraClip who initially presented to [facility] with chief complaint of urinary retention.  Rapid response was called today for hypotension and chest pain.  Patient began to have midsternal chest pain EKG showed changes similar to prior EKG left bundle branch block, A-fib.  Troponin was ordered.  Patient was transferred to the ICU for further evaluation.  While patient was ICU he did show signs of possible anterior infarct.  He received loading dose of aspirin, and started on IV pressors for persistent hypotension that did not improve with fluid bolus.    Laboratory workup significant for lactic acidosis, troponin elevation, and EKG with concern for anterior lateral infarct.        OBJECTIVE    Vitals:    05/07/25 1632 05/07/25 1643 05/07/25 1700 05/07/25 1800   BP: (!) 68/49 (!) 64/42 77/51    BP Location:       Patient Position:       Pulse: 55  61 89   Resp:   20 24   Temp:       TempSrc:       SpO2:       Weight:       Height:          Results from last 7 days   Lab Units 05/07/25  1605 05/07/25  0554   WBC AUTO x10*3/uL 9.5 7.0   HEMOGLOBIN g/dL 10.4* 10.5*   HEMATOCRIT % 33.2* 33.0*   PLATELETS AUTO x10*3/uL 219 194   NEUTROS PCT AUTO %  --  69.5   LYMPHS PCT AUTO %  --  17.5   MONOS PCT AUTO %  --  8.7   EOS PCT AUTO %  --  3.2     Results from last 7 days   Lab Units 05/07/25  1742 05/07/25  1549   SODIUM mmol/L 133* 135*   POTASSIUM mmol/L 4.8 4.7   CHLORIDE mmol/L 104 104   CO2 mmol/L 15* 20*   BUN mg/dL 30* 28*   CREATININE mg/dL  1.23 1.08   CALCIUM mg/dL 8.8 9.2   PROTEIN TOTAL g/dL  --  6.3*   BILIRUBIN TOTAL mg/dL  --  1.1   ALK PHOS U/L  --  105   ALT U/L  --  19   AST U/L  --  29   GLUCOSE mg/dL 215* 151*       Scheduled Medications  Scheduled Medications[1]       Physical Exam   Physical Exam:  General: Patient in acute distress.  HEENT:  Normocephalic, atraumatic, mucus membranes moist.   Neck:  Trachea midline.  Positive JV distention  Chest:  Clear to auscultation bilaterally. No wheezes, rales, or rhonchi.  CV:  Regular rate and rhythm.  Positive S1/S2.   Abdomen: Bowel sounds present in all four quadrants, abdomen is soft, non-tender, non-distended.  Extremities:  No lower extremity edema or cyanosis.   Neurological:  AAOx3.   Skin:  Warm and dry.      ASSESSMENT & PLAN    Neuro:  Avoid ACE induced delirium    CV:  #Cardiogenic shock  #Concern for ACS  #EKG showing signs of anterolateral infarction  #History of CHF with reduced EF  #History of NSTEMI    -Patient currently on Levophed and epinephrine.  Maintain MAP greater than 65  - Hold antihypertensives in the setting of shock  - Patient started on heparin drip  - Patient loaded with high-dose statin  - Cardiology team consulted    Respiratory:  FiO2 (%):  [21 %] 21 %  -Acute hypoxemia requiring nasal cannula  - Airway management for now.  Patient actively protecting his airway    GI:  No active issues    Endo:  No active issues.  Monitor blood glucose closely.    Renal:  #Lactic Acidosis in the setting of hypoperfusion due to shock   #DOYLE  #Hematuria  #Acute urinary retention  -Zarco catheter placed  - Monitor creatinine  - Monitor I's and O's    Hematological:  #Chronic anemia  - Monitor CBC.  Transfuse if hemoglobin less than 7    ID:  #UTI  - Patient on ceftriaxone    Vent/O2: FiO2 (%):  [21 %] 21 %   Antimicrobials: Ceftriaxone  Feeding: N.p.o.  Fluids: -  Analgesia: -  Sedation: -  Thromboprophylaxis: SCDs   Ulcer prophylaxis: -  Glycemic control: BGM  Bowel care: PRN    Indwelling catheters: Zarco   Lines: PIVs, left femoral, left radial A-line  Dispo: MICU   Code Status: Full code    Dr. Harshal Webb   Internal Medicine PGY-3  May 7, 2025 6:46 PM             [1] aspirin, 324 mg, oral, Daily  cefTRIAXone, 1 g, intravenous, q24h  [Held by provider] furosemide, 20 mg, oral, Every other day  haloperidol lactate, , ,   [Held by provider] spironolactone, 12.5 mg, oral, Daily

## 2025-05-07 NOTE — PROGRESS NOTES
Rapid Response:   Pager time: 3:24 pm  Arrival time: 3:26 pm  Event end time: 4:10  Location: [place] [id number]     Rapid response initiated by:  [x] Rapid response RN [] Family [] Nursing Supervisor [] Physician   [] RADAR auto page [] Sepsis auto-page [] RN [] RT   [] NP/PA [] Other:     Primary reason for call:   [] BAT [] New CPAP/BiPAP [] Bleeding [] Change in mental status   [x] Chest pain [] Code blue [] FiO2 >/= 50% [] HR </= 40 bpm   [] HR >/= 130 bpm [] Hyperglycemia [] Hypoglycemia [] RADAR    [] RR </= 8 bpm [] RR >/= 30 bpm [x] SBP </= 90 mmHg [x] SpO2 < 90%   [] Seizure [] Sepsis [] Shortness of breath  [] Staff concern:      Initial VS: T 35 °C; HR 60; RR 18; BP 71/51; SPO2 90%.    Interventions:  [] None [] ABG/VBG [] Assist w/ ICU transfer [] BAT paged    [] Bag mask [] Blood [] Cardioversion [] Code Blue   [] Code blue for intubation [] Code status changed [x] Chest x-ray [x] EKG   [] IV fluid/bolus [] KUB x-ray [x] Labs: Troponin, BNP, CBC, BMP, lactate [] Medication   [] Nebulizer treatment [] NIPPV (CPAP/BiPAP) [x] Oxygen [] Oral airway   [] Peripheral IV [] Palliative care consult [] CT/MRI [] Sepsis protocol    [] Suctioned [] Other:     Outcome:  [] Coded and  [] Code blue for intubation [] Coded and transferred to ICU []  on division   [] Remained on division (no change) [x] Remained on division + additional monitoring [] Remained in ED [] Transferred to ED   [] Transferred to ICU [] Transferred to inpatient status [] Transferred for interventions (procedure) [] Transferred to ICU stepdown    [] Transferred to surgery [] Transferred to telemetry [] Sepsis protocol [] STEMI protocol   [] Stroke protocol [] Bedside nurse instructed to page rapid response for any concerns or acute change in condition/VS       Physical Exam:   GENERAL: Awake/alert  HEAD:  Normocephalic, atraumatic.  EYES:  Round and reactive  ENT:  No nasal discharge  NECK:  Atraumatic  CARDIOVASCULAR: S1-S2  appreciated  RESPIRATORY: Normal breath sounds  ABDOMEN: Tenderness to palpation of abdominal  EXTREMITIES:  No peripheral edema  SKIN:  Warm and dry  NEUROLOGICAL:  Nonfocal grossly     Additional Comments:  #ACS rule out  #Hypertension  #C/F postobstructive diuresis  Nabeel is a 89-year-old male with past medical history of paroxysmal atrial fibrillation, coronary artery disease (NSTEMI 2022, PCI of LAD), severe MR (s/p clipping), heart failure with reduced ejection fraction (EF: 35% 11/2024), prostate cancer s/p radiation complicated by radiation cystitis, is admitted to [facility] for urinary retention, hematuria complicated by an acute kidney injury.  Eliquis and aspirin were held in setting of hematuria, patient underwent continuous bladder irrigation leading to improvement.  Rapid response was called today at 3:24 PM for hypotension and chest pain.  Patient began to have midsternal chest pain, EKG was ordered which showed changes similar to prior EKGs, left bundle branch block, atrial fibrillation.  Troponin was ordered.  We also ordered a lactate and gave patient 1 L fluid bolus.  Chest x-ray was ordered which was unchanged from prior chest x-rays, no infiltrate consolidation or significant pleural effusion.  Patient was also placed on supplemental oxygen via nasal cannula as his pulse ox had dropped in the high 80s, few minutes later his pulse ox improved to high 90s on 2 L.  Bladder scan was also done due to concern of postobstructive diuresis leading to hypotension, which resulted 2 mL.  Given patient has a chest pain and hypotension, nausea ,emesis, diaphoresis he could have an inferior wall MI, we will follow-up the troponin.  If troponins are elevated we will reach out to cardiology.  Repeat blood pressures while receiving fluid bolus improved with latest blood pressure 84/52.  We will continue to monitor blood pressure every 15 minutes for the first hour followed by every 1 hour.    Please refer to the  checklist to see what labs were ordered.    Rona Caban MD  PGY1 IM    UPDATE: Blood pressure not responsive to fluids (completed 1L bolus with MAPs persistently in 50s). Labs notable for elevated trop to 94 and lactate to 3.0. On-call intensivist notified and assessed patient and patient accepted for transfer to unit. Patient was given Neosynephrine push and 3.5mg IV haldol en route. Will update Primary Team on change of status.

## 2025-05-08 VITALS
SYSTOLIC BLOOD PRESSURE: 88 MMHG | RESPIRATION RATE: 28 BRPM | TEMPERATURE: 97 F | WEIGHT: 154.98 LBS | HEIGHT: 68 IN | HEART RATE: 74 BPM | OXYGEN SATURATION: 94 % | DIASTOLIC BLOOD PRESSURE: 54 MMHG | BODY MASS INDEX: 23.49 KG/M2

## 2025-05-08 LAB
ALBUMIN SERPL BCP-MCNC: 3.4 G/DL (ref 3.4–5)
ALP SERPL-CCNC: 132 U/L (ref 33–136)
ALT SERPL W P-5'-P-CCNC: 3254 U/L (ref 10–52)
ANION GAP BLDA CALCULATED.4IONS-SCNC: 29 MMO/L (ref 10–25)
ANION GAP SERPL CALC-SCNC: 34 MMOL/L (ref 10–20)
AORTIC VALVE PEAK VELOCITY: 1.28 M/S
APPARATUS: ABNORMAL
AST SERPL W P-5'-P-CCNC: 3648 U/L (ref 9–39)
ATRIAL RATE: 60 BPM
ATRIAL RATE: 92 BPM
AV PEAK GRADIENT: 7 MMHG
BASE EXCESS BLDA CALC-SCNC: -17.3 MMOL/L (ref -2–3)
BASOPHILS # BLD AUTO: 0.01 X10*3/UL (ref 0–0.1)
BASOPHILS NFR BLD AUTO: 0.1 %
BILIRUB SERPL-MCNC: 1.8 MG/DL (ref 0–1.2)
BODY TEMPERATURE: 37 DEGREES CELSIUS
BUN SERPL-MCNC: 34 MG/DL (ref 6–23)
CA-I BLDA-SCNC: 1.08 MMOL/L (ref 1.1–1.33)
CALCIUM SERPL-MCNC: 8.1 MG/DL (ref 8.6–10.3)
CARDIAC TROPONIN I PNL SERPL HS: ABNORMAL NG/L (ref 0–20)
CHLORIDE BLDA-SCNC: 104 MMOL/L (ref 98–107)
CHLORIDE SERPL-SCNC: 107 MMOL/L (ref 98–107)
CO2 SERPL-SCNC: 6 MMOL/L (ref 21–32)
CREAT SERPL-MCNC: 2.32 MG/DL (ref 0.5–1.3)
CRITICAL CALL TIME: 0
CRITICAL CALLED BY: ABNORMAL
CRITICAL CALLED TO: ABNORMAL
CRITICAL READ BACK: ABNORMAL
EGFRCR SERPLBLD CKD-EPI 2021: 26 ML/MIN/1.73M*2
EJECTION FRACTION APICAL 4 CHAMBER: 16.5
EJECTION FRACTION: 18 %
EOSINOPHIL # BLD AUTO: 0 X10*3/UL (ref 0–0.4)
EOSINOPHIL NFR BLD AUTO: 0 %
ERYTHROCYTE [DISTWIDTH] IN BLOOD BY AUTOMATED COUNT: 13.3 % (ref 11.5–14.5)
FLOW: 4 LPM
GLOBAL LONGITUDINAL STRAIN: 2.2 %
GLUCOSE BLDA-MCNC: 181 MG/DL (ref 74–99)
GLUCOSE SERPL-MCNC: 88 MG/DL (ref 74–99)
HCO3 BLDA-SCNC: 7.6 MMOL/L (ref 22–26)
HCT VFR BLD AUTO: 33.2 % (ref 41–52)
HCT VFR BLD EST: 33 % (ref 41–52)
HGB BLD-MCNC: 9.6 G/DL (ref 13.5–17.5)
HGB BLDA-MCNC: 10.9 G/DL (ref 13.5–17.5)
IMM GRANULOCYTES # BLD AUTO: 0.22 X10*3/UL (ref 0–0.5)
IMM GRANULOCYTES NFR BLD AUTO: 1.3 % (ref 0–0.9)
INHALED O2 CONCENTRATION: 36 %
LACTATE BLDA-SCNC: 13.7 MMOL/L (ref 0.4–2)
LACTATE SERPL-SCNC: 18.4 MMOL/L (ref 0.4–2)
LYMPHOCYTES # BLD AUTO: 1.68 X10*3/UL (ref 0.8–3)
LYMPHOCYTES NFR BLD AUTO: 10 %
MAGNESIUM SERPL-MCNC: 2.95 MG/DL (ref 1.6–2.4)
MCH RBC QN AUTO: 33.2 PG (ref 26–34)
MCHC RBC AUTO-ENTMCNC: 28.9 G/DL (ref 32–36)
MCV RBC AUTO: 115 FL (ref 80–100)
MONOCYTES # BLD AUTO: 1.13 X10*3/UL (ref 0.05–0.8)
MONOCYTES NFR BLD AUTO: 6.7 %
NEUTROPHILS # BLD AUTO: 13.77 X10*3/UL (ref 1.6–5.5)
NEUTROPHILS NFR BLD AUTO: 81.9 %
NRBC BLD-RTO: 0 /100 WBCS (ref 0–0)
OXYHGB MFR BLDA: 97.3 % (ref 94–98)
PATH REVIEW-CBC DIFFERENTIAL: NORMAL
PCO2 BLDA: 17 MM HG (ref 38–42)
PH BLDA: 7.26 PH (ref 7.38–7.42)
PHOSPHATE SERPL-MCNC: 10.2 MG/DL (ref 2.5–4.9)
PLATELET # BLD AUTO: 133 X10*3/UL (ref 150–450)
PO2 BLDA: 134 MM HG (ref 85–95)
POTASSIUM BLDA-SCNC: 5.2 MMOL/L (ref 3.5–5.3)
POTASSIUM SERPL-SCNC: 6.1 MMOL/L (ref 3.5–5.3)
PROT SERPL-MCNC: 5.5 G/DL (ref 6.4–8.2)
Q ONSET: 192 MS
Q ONSET: 213 MS
QRS COUNT: 10 BEATS
QRS COUNT: 9 BEATS
QRS DURATION: 164 MS
QRS DURATION: 174 MS
QT INTERVAL: 460 MS
QT INTERVAL: 484 MS
QTC CALCULATION(BAZETT): 463 MS
QTC CALCULATION(BAZETT): 470 MS
QTC FREDERICIA: 467 MS
QTC FREDERICIA: 470 MS
R AXIS: -57 DEGREES
R AXIS: 178 DEGREES
RBC # BLD AUTO: 2.89 X10*6/UL (ref 4.5–5.9)
RIGHT VENTRICLE FREE WALL PEAK S': 7.51 CM/S
RIGHT VENTRICLE PEAK SYSTOLIC PRESSURE: 26.2 MMHG
SAO2 % BLDA: 99 % (ref 94–100)
SODIUM BLDA-SCNC: 135 MMOL/L (ref 136–145)
SODIUM SERPL-SCNC: 141 MMOL/L (ref 136–145)
SPECIMEN DRAWN FROM PATIENT: ABNORMAL
T AXIS: -10 DEGREES
T AXIS: -20 DEGREES
T OFFSET: 422 MS
T OFFSET: 455 MS
TRICUSPID ANNULAR PLANE SYSTOLIC EXCURSION: 0.7 CM
UFH PPP CHRO-ACNC: 0.9 IU/ML (ref ?–1.1)
VANCOMYCIN SERPL-MCNC: 2.9 UG/ML (ref 5–20)
VENTRICULAR RATE: 55 BPM
VENTRICULAR RATE: 63 BPM
WBC # BLD AUTO: 16.8 X10*3/UL (ref 4.4–11.3)

## 2025-05-08 PROCEDURE — 80202 ASSAY OF VANCOMYCIN: CPT | Performed by: INTERNAL MEDICINE

## 2025-05-08 PROCEDURE — 99223 1ST HOSP IP/OBS HIGH 75: CPT

## 2025-05-08 PROCEDURE — 2500000005 HC RX 250 GENERAL PHARMACY W/O HCPCS

## 2025-05-08 PROCEDURE — 93010 ELECTROCARDIOGRAM REPORT: CPT | Performed by: STUDENT IN AN ORGANIZED HEALTH CARE EDUCATION/TRAINING PROGRAM

## 2025-05-08 PROCEDURE — 2500000004 HC RX 250 GENERAL PHARMACY W/ HCPCS (ALT 636 FOR OP/ED): Mod: JZ | Performed by: INTERNAL MEDICINE

## 2025-05-08 PROCEDURE — 36556 INSERT NON-TUNNEL CV CATH: CPT | Performed by: STUDENT IN AN ORGANIZED HEALTH CARE EDUCATION/TRAINING PROGRAM

## 2025-05-08 PROCEDURE — 85025 COMPLETE CBC W/AUTO DIFF WBC: CPT

## 2025-05-08 PROCEDURE — 2500000004 HC RX 250 GENERAL PHARMACY W/ HCPCS (ALT 636 FOR OP/ED): Mod: JZ

## 2025-05-08 PROCEDURE — 37799 UNLISTED PX VASCULAR SURGERY: CPT | Performed by: STUDENT IN AN ORGANIZED HEALTH CARE EDUCATION/TRAINING PROGRAM

## 2025-05-08 PROCEDURE — 37799 UNLISTED PX VASCULAR SURGERY: CPT

## 2025-05-08 PROCEDURE — 83605 ASSAY OF LACTIC ACID: CPT | Performed by: STUDENT IN AN ORGANIZED HEALTH CARE EDUCATION/TRAINING PROGRAM

## 2025-05-08 PROCEDURE — 80053 COMPREHEN METABOLIC PANEL: CPT

## 2025-05-08 PROCEDURE — 84100 ASSAY OF PHOSPHORUS: CPT

## 2025-05-08 PROCEDURE — 85520 HEPARIN ASSAY: CPT

## 2025-05-08 PROCEDURE — 83735 ASSAY OF MAGNESIUM: CPT

## 2025-05-08 PROCEDURE — 2500000005 HC RX 250 GENERAL PHARMACY W/O HCPCS: Performed by: INTERNAL MEDICINE

## 2025-05-08 PROCEDURE — 84484 ASSAY OF TROPONIN QUANT: CPT | Performed by: STUDENT IN AN ORGANIZED HEALTH CARE EDUCATION/TRAINING PROGRAM

## 2025-05-08 RX ORDER — FENTANYL CITRATE-0.9 % NACL/PF 10 MCG/ML
25-200 PLASTIC BAG, INJECTION (ML) INTRAVENOUS CONTINUOUS
Status: DISCONTINUED | OUTPATIENT
Start: 2025-05-08 | End: 2025-05-08 | Stop reason: HOSPADM

## 2025-05-08 RX ORDER — NOREPINEPHRINE BITARTRATE 0.06 MG/ML
0-2 INJECTION, SOLUTION INTRAVENOUS CONTINUOUS
Status: DISCONTINUED | OUTPATIENT
Start: 2025-05-08 | End: 2025-05-08 | Stop reason: HOSPADM

## 2025-05-08 RX ORDER — KETOROLAC TROMETHAMINE 30 MG/ML
15 INJECTION, SOLUTION INTRAMUSCULAR; INTRAVENOUS EVERY 6 HOURS PRN
Status: DISCONTINUED | OUTPATIENT
Start: 2025-05-08 | End: 2025-05-08 | Stop reason: HOSPADM

## 2025-05-08 RX ORDER — HALOPERIDOL LACTATE 5 MG/ML
0.25 INJECTION, SOLUTION INTRAMUSCULAR EVERY 6 HOURS PRN
Status: DISCONTINUED | OUTPATIENT
Start: 2025-05-08 | End: 2025-05-08 | Stop reason: HOSPADM

## 2025-05-08 RX ORDER — LORAZEPAM 2 MG/ML
0.25 INJECTION INTRAMUSCULAR
Status: DISCONTINUED | OUTPATIENT
Start: 2025-05-08 | End: 2025-05-08 | Stop reason: HOSPADM

## 2025-05-08 RX ORDER — FENTANYL CITRATE 50 UG/ML
25 INJECTION, SOLUTION INTRAMUSCULAR; INTRAVENOUS ONCE
Status: COMPLETED | OUTPATIENT
Start: 2025-05-08 | End: 2025-05-08

## 2025-05-08 RX ORDER — LORAZEPAM 2 MG/ML
2 INJECTION INTRAMUSCULAR EVERY 10 MIN PRN
Status: DISCONTINUED | OUTPATIENT
Start: 2025-05-08 | End: 2025-05-08 | Stop reason: HOSPADM

## 2025-05-08 RX ORDER — LORAZEPAM 2 MG/ML
0.5 INJECTION INTRAMUSCULAR EVERY 4 HOURS PRN
Status: DISCONTINUED | OUTPATIENT
Start: 2025-05-08 | End: 2025-05-08 | Stop reason: HOSPADM

## 2025-05-08 RX ORDER — HALOPERIDOL LACTATE 5 MG/ML
1 INJECTION, SOLUTION INTRAMUSCULAR EVERY 4 HOURS PRN
Status: DISCONTINUED | OUTPATIENT
Start: 2025-05-08 | End: 2025-05-08 | Stop reason: HOSPADM

## 2025-05-08 RX ORDER — GLYCOPYRROLATE 0.2 MG/ML
0.2 INJECTION INTRAMUSCULAR; INTRAVENOUS EVERY 4 HOURS PRN
Status: DISCONTINUED | OUTPATIENT
Start: 2025-05-08 | End: 2025-05-08 | Stop reason: HOSPADM

## 2025-05-08 RX ORDER — SODIUM BICARBONATE 1 MEQ/ML
SYRINGE (ML) INTRAVENOUS
Status: COMPLETED
Start: 2025-05-08 | End: 2025-05-08

## 2025-05-08 RX ORDER — SODIUM BICARBONATE 1 MEQ/ML
50 SYRINGE (ML) INTRAVENOUS ONCE
Status: COMPLETED | OUTPATIENT
Start: 2025-05-08 | End: 2025-05-08

## 2025-05-08 RX ORDER — EPINEPHRINE IN 0.9 % SOD CHLOR 4MG/250ML
0-1 PLASTIC BAG, INJECTION (ML) INTRAVENOUS CONTINUOUS
Status: DISCONTINUED | OUTPATIENT
Start: 2025-05-08 | End: 2025-05-08

## 2025-05-08 RX ORDER — ONDANSETRON HYDROCHLORIDE 2 MG/ML
4 INJECTION, SOLUTION INTRAVENOUS EVERY 4 HOURS PRN
Status: DISCONTINUED | OUTPATIENT
Start: 2025-05-08 | End: 2025-05-08 | Stop reason: HOSPADM

## 2025-05-08 RX ADMIN — EPINEPHRINE 1 MCG/KG/MIN: 1 INJECTION INTRAMUSCULAR; INTRAVENOUS; SUBCUTANEOUS at 07:01

## 2025-05-08 RX ADMIN — EPINEPHRINE IN SODIUM CHLORIDE 1 MCG/KG/MIN: 16 INJECTION INTRAVENOUS at 02:53

## 2025-05-08 RX ADMIN — NOREPINEPHRINE BITARTRATE 2 MCG/KG/MIN: 32 SOLUTION INTRAVENOUS at 04:59

## 2025-05-08 RX ADMIN — NOREPINEPHRINE BITARTRATE 0.21 MCG/KG/MIN: 32 SOLUTION INTRAVENOUS at 00:32

## 2025-05-08 RX ADMIN — EPINEPHRINE IN SODIUM CHLORIDE 1 MCG/KG/MIN: 16 INJECTION INTRAVENOUS at 03:57

## 2025-05-08 RX ADMIN — HYDROMORPHONE HYDROCHLORIDE 0.4 MG: 1 INJECTION, SOLUTION INTRAMUSCULAR; INTRAVENOUS; SUBCUTANEOUS at 06:10

## 2025-05-08 RX ADMIN — EPINEPHRINE IN SODIUM CHLORIDE 1 MCG/KG/MIN: 16 INJECTION INTRAVENOUS at 04:54

## 2025-05-08 RX ADMIN — FENTANYL CITRATE 25 MCG: 50 INJECTION INTRAMUSCULAR; INTRAVENOUS at 08:36

## 2025-05-08 RX ADMIN — HALOPERIDOL LACTATE 0.25 MG: 5 INJECTION, SOLUTION INTRAMUSCULAR at 04:45

## 2025-05-08 RX ADMIN — SODIUM BICARBONATE 50 MEQ: 84 INJECTION INTRAVENOUS at 01:06

## 2025-05-08 RX ADMIN — HYDROMORPHONE HYDROCHLORIDE 0.4 MG: 1 INJECTION, SOLUTION INTRAMUSCULAR; INTRAVENOUS; SUBCUTANEOUS at 04:42

## 2025-05-08 RX ADMIN — NOREPINEPHRINE BITARTRATE 2 MCG/KG/MIN: 32 SOLUTION INTRAVENOUS at 05:56

## 2025-05-08 RX ADMIN — VASOPRESSIN 0.03 UNITS/MIN: 0.2 INJECTION INTRAVENOUS at 05:56

## 2025-05-08 RX ADMIN — NOREPINEPHRINE BITARTRATE 2 MCG/KG/MIN: 32 SOLUTION INTRAVENOUS at 03:57

## 2025-05-08 RX ADMIN — ONDANSETRON 4 MG: 2 INJECTION INTRAMUSCULAR; INTRAVENOUS at 01:18

## 2025-05-08 RX ADMIN — EPINEPHRINE IN SODIUM CHLORIDE 1 MCG/KG/MIN: 16 INJECTION INTRAVENOUS at 05:56

## 2025-05-08 RX ADMIN — EPINEPHRINE IN SODIUM CHLORIDE 1 MCG/KG/MIN: 16 INJECTION INTRAVENOUS at 01:01

## 2025-05-08 RX ADMIN — HYDROMORPHONE HYDROCHLORIDE 0.4 MG: 1 INJECTION, SOLUTION INTRAMUSCULAR; INTRAVENOUS; SUBCUTANEOUS at 03:23

## 2025-05-08 RX ADMIN — EPINEPHRINE IN SODIUM CHLORIDE 1 MCG/KG/MIN: 16 INJECTION INTRAVENOUS at 01:48

## 2025-05-08 RX ADMIN — NOREPINEPHRINE BITARTRATE 2 MCG/KG/MIN: 64 SOLUTION INTRAVENOUS at 06:58

## 2025-05-08 RX ADMIN — Medication 50 MEQ: at 01:06

## 2025-05-08 RX ADMIN — NOREPINEPHRINE BITARTRATE 2 MCG/KG/MIN: 32 SOLUTION INTRAVENOUS at 03:00

## 2025-05-08 RX ADMIN — HYDROMORPHONE HYDROCHLORIDE 0.4 MG: 1 INJECTION, SOLUTION INTRAMUSCULAR; INTRAVENOUS; SUBCUTANEOUS at 07:56

## 2025-05-08 RX ADMIN — SODIUM BICARBONATE 50 MEQ: 84 INJECTION INTRAVENOUS at 00:14

## 2025-05-08 ASSESSMENT — PAIN - FUNCTIONAL ASSESSMENT
PAIN_FUNCTIONAL_ASSESSMENT: 0-10
PAIN_FUNCTIONAL_ASSESSMENT: CPOT (CRITICAL CARE PAIN OBSERVATION TOOL)
PAIN_FUNCTIONAL_ASSESSMENT: 0-10

## 2025-05-08 ASSESSMENT — PAIN SCALES - GENERAL
PAINLEVEL_OUTOF10: 0 - NO PAIN
PAINLEVEL_OUTOF10: 6
PAINLEVEL_OUTOF10: 0 - NO PAIN

## 2025-05-08 ASSESSMENT — ENCOUNTER SYMPTOMS
PSYCHIATRIC NEGATIVE: 1
EYES NEGATIVE: 1
CARDIOVASCULAR NEGATIVE: 1
ENDOCRINE NEGATIVE: 1
WEAKNESS: 1
HEMATOLOGIC/LYMPHATIC NEGATIVE: 1
ACTIVITY CHANGE: 1
GASTROINTESTINAL NEGATIVE: 1
ALLERGIC/IMMUNOLOGIC NEGATIVE: 1
BACK PAIN: 1
FATIGUE: 1
SHORTNESS OF BREATH: 1

## 2025-05-08 ASSESSMENT — PAIN DESCRIPTION - DESCRIPTORS: DESCRIPTORS: ACHING

## 2025-05-08 NOTE — SIGNIFICANT EVENT
I was called to the bedside to pronounce that patient in room 123 had .  No spontaneous movements were present.  There was no response to verbal or tactile stimuli.  Pupils were dilated and fixed.  Corneal reflex absent.  No breath sounds were auscultated.  No carotid pulse was palpable.  No heart sounds were auscultated over entire precordium. Patient pronounced at 0927 on 2025.    Perry Mandujano DO, PGY-1, Internal Medicine

## 2025-05-08 NOTE — CARE PLAN
Problem: Safety - Adult  Goal: Free from fall injury  Outcome: Progressing     Problem: Discharge Planning  Goal: Discharge to home or other facility with appropriate resources  Outcome: Progressing     Problem: Fall/Injury  Goal: Not fall by end of shift  Outcome: Progressing  Goal: Be free from injury by end of the shift  Outcome: Progressing  Goal: Verbalize understanding of personal risk factors for fall in the hospital  Outcome: Progressing  Goal: Verbalize understanding of risk factor reduction measures to prevent injury from fall in the home  Outcome: Progressing  Goal: Use assistive devices by end of the shift  Outcome: Progressing  Goal: Pace activities to prevent fatigue by end of the shift  Outcome: Progressing     Problem: Pain  Goal: Takes deep breaths with improved pain control throughout the shift  Outcome: Progressing  Goal: Walks with improved pain control throughout the shift  Outcome: Progressing  Goal: Performs ADL's with improved pain control throughout shift  Outcome: Progressing  Goal: Participates in PT with improved pain control throughout the shift  Outcome: Progressing  Goal: Free from opioid side effects throughout the shift  Outcome: Progressing  Goal: Free from acute confusion related to pain meds throughout the shift  Outcome: Progressing       Problem: Pain - Adult  Goal: Verbalizes/displays adequate comfort level or baseline comfort level  Outcome: Not Progressing     Problem: Chronic Conditions and Co-morbidities  Goal: Patient's chronic conditions and co-morbidity symptoms are monitored and maintained or improved  Outcome: Not Progressing     Problem: Nutrition  Goal: Nutrient intake appropriate for maintaining nutritional needs  Outcome: Not Progressing     Problem: Skin  Goal: Promote skin healing  Outcome: Not Progressing  Flowsheets (Taken 5/8/2025 4739)  Promote skin healing:   Turn/reposition every 2 hours/use positioning/transfer devices   Assess skin/pad under  line(s)/device(s)     Problem: Pain  Goal: Turns in bed with improved pain control throughout the shift  Outcome: Not Progressing

## 2025-05-08 NOTE — DISCHARGE SUMMARY
Discharge Diagnosis  End of life    Test Results Pending At Discharge  Pending Labs       Order Current Status    Pathologist Review-CBC Differential In process            Hospital Course   Patient is a 89-year-old male with medical history significant for prostate cancer, hypertension, heart failure, COPD, diverticulosis, NSTEMI in  with stent placement, mitral regurgitation status post MitraClip who initially presented to [facility] with chief complaint of urinary retention. Clinical course complicated by pt going into Cardiogenic shock requiring max pressors. After extensive GOC discussion pt was made DNR comfort care.     Pertinent Physical Exam At Time of Discharge  Physical Exam    Pt      Home Medications     Medication List      START taking these medications     cephalexin 500 mg capsule; Commonly known as: Keflex; Take 1 capsule   (500 mg) by mouth 4 times a day for 10 days.     ASK your doctor about these medications     acetaminophen 325 mg tablet; Commonly known as: TylenoL; Take 2 tablets   (650 mg) by mouth every 6 hours if needed for mild pain (1 - 3) or fever   (temp greater than 38.0 C).   albuterol 90 mcg/actuation inhaler   amLODIPine 2.5 mg tablet; Commonly known as: Norvasc   apixaban 5 mg tablet; Commonly known as: Eliquis; Take 1 tablet (5 mg)   by mouth every 12 hours.   ascorbic acid 500 mg tablet; Commonly known as: Vitamin C   atorvastatin 20 mg tablet; Commonly known as: Lipitor; Take 2 tablets   (40 mg) by mouth once daily.   Breztri Aerosphere 160-9-4.8 mcg/actuation HFA aerosol inhaler; Generic   drug: budesonide-glycopyr-formoterol   clopidogrel 75 mg tablet; Commonly known as: Plavix   cyclobenzaprine 10 mg tablet; Commonly known as: Flexeril; Take 0.5   tablets (5 mg) by mouth 3 times a day for 10 days.   furosemide 20 mg tablet; Commonly known as: Lasix; Take 1 tablet (20 mg)   by mouth every other day.   ibuprofen 600 mg tablet; Take 1 tablet (600 mg) by mouth every 8  hours   if needed for mild pain (1 - 3) or fever (temp greater than 38.0 C).   metoprolol succinate XL 25 mg 24 hr tablet; Commonly known as:   Toprol-XL; Take 0.5 tablets (12.5 mg) by mouth once daily.   multivitamin tablet   olmesartan 20 mg tablet; Commonly known as: BENIcar   spironolactone 25 mg tablet; Commonly known as: Aldactone; Take 0.5   tablets (12.5 mg) by mouth once daily.       Outpatient Follow-Up  Future Appointments   Date Time Provider Department Center   4/9/2026  2:00 PM Carmen Zimmerman MD [id number] [facility]   4/27/2026 10:00 AM Nelson Shin MD PhD [id number] [facility] DO Jon

## 2025-05-08 NOTE — PROGRESS NOTES
Patrick Vázquez is a 89 y.o. male on day 6 of admission presenting with Urinary retention.    Patient evaluated at bedside, worsening metabolic acidosis, with shock, he is awake and alert, family at bedside.       POCUS demonstrate low LVEF with minimal septal movement, with plethoric IVC, with A lines throughout lung fields that are not suggestive of pulmonary edema.     The patient was prescribed 500 ml of LR.    Raina Richter MD  Pulmonary and Critical Care Medicine

## 2025-05-08 NOTE — PROGRESS NOTES
Patrick Vázquez is a 89 y.o. male on day 6 of admission presenting with Urinary retention.      Subjective   Patient seen and examined at bedside. Zarco removed this morning, patient on voiding trial. He has no complaints.       Objective     Last Recorded Vitals  /65   Pulse (!) 130   Temp 37 °C (98.6 °F) (Temporal)   Resp 17   Wt 68 kg (150 lb)   SpO2 99%   Intake/Output last 3 Shifts:    Intake/Output Summary (Last 24 hours) at 5/7/2025 2209  Last data filed at 5/7/2025 2142  Gross per 24 hour   Intake 1201.33 ml   Output --   Net 1201.33 ml       Admission Weight  Weight: 70.3 kg (155 lb) (04/30/25 2024)    Daily Weight  05/03/25 : 68 kg (150 lb)    Image Results  XR chest 1 view  Narrative: Interpreted By:  Belkys Franklin,   STUDY:  XR CHEST 1 VIEW;  5/7/2025 3:58 pm      INDICATION:  Signs/Symptoms:chest pain.      COMPARISON:  12/30/2024      ACCESSION NUMBER(S):  [id number]      ORDERING CLINICIAN:  MAURA EWING      FINDINGS:  AP radiograph of the chest was provided.              CARDIOMEDIASTINAL SILHOUETTE:  Persistently enlarged cardiomediastinal silhouette.      LUNGS:  Small bilateral pleural effusions, increased from prior exam. No  pneumothorax.      ABDOMEN:  No remarkable upper abdominal findings.      BONES:  No acute osseous changes.      Impression: Small bilateral pleural effusions, increased from prior exam.  Superimposed infection could be considered in the appropriate  clinical context. Unchanged cardiomegaly/pericardial effusion.      Signed by: Belkys Franklin 5/7/2025 4:44 PM  Dictation workstation:   [id number]      Physical Exam  Constitutional:       Appearance: Normal appearance.   HENT:      Head: Normocephalic and atraumatic.      Nose: Nose normal.      Mouth/Throat:      Mouth: Mucous membranes are moist.      Pharynx: Oropharynx is clear.   Eyes:      Extraocular Movements: Extraocular movements intact.      Pupils: Pupils are equal, round, and reactive to light.    Cardiovascular:      Rate and Rhythm: Normal rate and regular rhythm.   Pulmonary:      Effort: No respiratory distress.      Breath sounds: Normal breath sounds. No wheezing, rhonchi or rales.   Abdominal:      General: Bowel sounds are normal. There is no distension.      Palpations: Abdomen is soft.      Tenderness: There is no abdominal tenderness. There is no guarding.   Musculoskeletal:      Right lower leg: No edema.      Left lower leg: No edema.   Skin:     General: Skin is warm and dry.   Neurological:      Mental Status: He is alert and oriented to person, place, and time. Mental status is at baseline.   Psychiatric:         Mood and Affect: Mood normal.         Behavior: Behavior normal.         Relevant Results               This patient currently has cardiac telemetry ordered; if you would like to modify or discontinue the telemetry order, click here to go to the orders activity to modify/discontinue the order.    This patient has a central line   Reason for the central line remaining today? Parenteral medication            Assessment & Plan  Urinary retention    DOYLE (acute kidney injury)    Hematuria    Anemia    Patrick is a 89-year-old male patient presented to emergency department for evaluation of urinary retention.  Patient with a history of prostate cancer states he had a Zarco catheter placed 1 week ago and was removed at approximately noon today.  Patient states that since then he has been unable to urinate and developed lower abdominal pain.  Patient with a bladder scan of 440 in ED.  Had a Zraco catheter placed and was initially going to be discharged home on prophylactic Keflex with follow-up to his urologist.  Patient developed jeremiah hematuria which clogged his Zarco catheter requiring continuous bladder irrigation.  Patient also has a medication mixup in which she has been taking Plavix, Eliquis, and baby aspirin daily.  Per medical records patient was removed from his Plavix on 4/7/2025  by his cardiologist.  There was a telephone conversation in which this was cleared up again on 4/25/2025 and the patient states he would take that Plavix out of his pillbox but he is unsure whether he did.  Consult placed to urology.  Labs obtained, patient found to have an DOYLE and anemia.  Vital signs within normal limits.  Patient admitted for further medical management.     Urinary retention/DOYLE/hematuria/anemia  Inpatient telemetry admission to Dr. Hilda Zarco/continuous bladder irrigation  Consult urology and appreciate input  Hold home anticoagulation including Eliquis, baby aspirin  Patient requiring medication education  Trend hemoglobin  No IV fluids for DOYLE 2/2 history of CHF  Hold home furosemide/Aldactone 2/2 DOYLE  Sock pitting edema noted to bilateral lower extremities  Cardiac/low-sodium diet  Tylenol as needed     Prostate cancer/HTN/CHF/COPD/diverticulosis/CAD/DJD  #Chronic conditions  Continue home atorvastatin 40 mg daily  Continue home inhalers  Hold home Eliquis, baby aspirin, Lasix, Aldactone  Attending to restart medications as appropriate  Echocardiogram on file 3/6/2025: EF 50 to 55%, mild AVR     DVT Ppx  SCDs  No chemical prophylaxis 2/2 hematuria  Up to chair with assistance     5/7: Hematuria resolved. CBI stopped per Urology. Zarco removed for voiding trial. Hemoglobin is stable at 10.5.  Creatinine is back to normal at 1.0. Potential discharge tomorrow.            Yash Caro, DO

## 2025-05-08 NOTE — CONSULTS
Inpatient consult to Palliative Care  Consult performed by: BONNIE Mccord-CNP  Consult ordered by: Raina Morales MD          Reason For Consult  Reason for Consult: communication / medical decision making     History Of Present Illness  Patrick Vázquez is a 89 y.o. male with past medical history of HFrEF, NSTEMI 2022 status post PCI with stent placement, MVR status post MitraClip, COPD, prostate cancer, diverticulosis, and hypertension, who was originally admitted 5/1/2025 under the medicine service for urinary retention.  Urology was consulted as well as general surgery, and the patient was showing general signs of improvement.  On 5/7/2025, the patient began to have midsternal chest pain and hypotension; twelve-lead ECG showed changes similar to prior ECG left bundle branch block and atrial fibrillation, and high-sensitivity troponin did return elevated at 100.  There was concern the patient was experiencing an anterior infarct, and he was transferred to the ICU for further treatment.  The patient was given a loading dose of aspirin, started on IV heparin, and placed on IV pressors due to hypotension not responsive to fluid bolus.  The patient did also present with a lactic acidosis.  POCUS performed at the bedside evidenced low LVEF with minimal septal movement.  The patient did have an arterial line placed.  Of note, the patient is DNR CCA DNI CODE STATUS.  Palliative care was consulted to discuss goals of care and advance care planning.    Upon assessment of the patient this morning, the patient is drowsy but stated his pain in his chest is improved, as is his breathing.  He stated he continues to have back discomfort and asked if he could move to a bedside recliner.  The patient's family was bedside, and they indicated they are looking forward to speaking with the cardiologist today.    ESAS ([place] Symptom Assessment System):  Pain: Mild  Shortness of breath: Mild  Loss of appetite:  Moderate  Nausea: None  Constipation: None  Tiredness: Moderate  Drowsiness: Moderate  Anxiety: None  Depression: None  How you feel overall: Fair    PPS (Palliative Prognostic Scale): Previously, 60 to 70%.  Currently 20 to 30%.    PPI (Palliative Prognostic Index):    PMH: as above    Personal/Social History  The patient previously lives at home.  His wife is .  His children and extended family are supportive of him.  He reports he is a former smoker with no current alcohol nor illicit drug use.     Past Medical History  He has a past medical history of Cataract, CHF (congestive heart failure), Hypertension, Personal history of malignant neoplasm of prostate, and Personal history of other malignant neoplasm of skin.    Surgical History  He has a past surgical history that includes Other surgical history (2022); Other surgical history (2022); Other surgical history (2022); Other surgical history (2022); Other surgical history (2022); Other surgical history (2022); MR angio head wo IV contrast (2023); and MR angio neck wo IV contrast (2023).     Family History  Family History[1]  Allergies  Bee venom protein (honey bee)    Review of Systems   Constitutional:  Positive for activity change and fatigue.   HENT: Negative.     Eyes: Negative.    Respiratory:  Positive for shortness of breath.    Cardiovascular: Negative.    Gastrointestinal: Negative.    Endocrine: Negative.    Genitourinary: Negative.    Musculoskeletal:  Positive for back pain.   Skin: Negative.    Allergic/Immunologic: Negative.    Neurological:  Positive for weakness.   Hematological: Negative.    Psychiatric/Behavioral: Negative.          Physical Exam  Constitutional:       Appearance: He is normal weight. He is ill-appearing. He is not toxic-appearing or diaphoretic.      Comments: Elderly, frail; does not appear comfortable   HENT:      Head: Normocephalic and atraumatic.      Right Ear:  "External ear normal.      Left Ear: External ear normal.      Nose: Nose normal.      Mouth/Throat:      Mouth: Mucous membranes are moist.      Pharynx: Oropharynx is clear.   Eyes:      Pupils: Pupils are equal, round, and reactive to light.   Cardiovascular:      Rate and Rhythm: Normal rate.      Heart sounds: Murmur heard.   Pulmonary:      Effort: Respiratory distress present.      Breath sounds: Normal breath sounds.      Comments: Mild tachypnea noted; on low-flow nasal cannula  Abdominal:      General: Bowel sounds are normal. There is no distension.      Palpations: Abdomen is soft.      Tenderness: There is no abdominal tenderness.   Genitourinary:     Comments: Indwelling urinary catheter in place  Musculoskeletal:      Cervical back: No rigidity.      Right lower leg: No edema.      Left lower leg: No edema.   Skin:     General: Skin is dry.      Comments: Sallow skin tone   Neurological:      General: No focal deficit present.      Mental Status: He is alert and oriented to person, place, and time.      Motor: Weakness present.      Comments: Able to converse.  Able to follow simple commands.   Psychiatric:         Mood and Affect: Mood normal.         Behavior: Behavior normal.         Thought Content: Thought content normal.         Judgment: Judgment normal.         Last Recorded Vitals  Blood pressure 88/68, pulse 78, temperature 36.4 °C (97.5 °F), temperature source Temporal, resp. rate (!) 27, height 1.727 m (5' 8\"), weight 70.3 kg (154 lb 15.7 oz), SpO2 94%.    Relevant Results  Scheduled medications  Scheduled Medications[2]  Continuous medications  Continuous Medications[3]  PRN medications  PRN Medications[4]    Results for orders placed or performed during the hospital encounter of 04/30/25 (from the past 96 hours)   Comprehensive Metabolic Panel   Result Value Ref Range    Glucose 100 (H) 74 - 99 mg/dL    Sodium 136 136 - 145 mmol/L    Potassium 4.8 3.5 - 5.3 mmol/L    Chloride 104 98 - 107 " mmol/L    Bicarbonate 25 21 - 32 mmol/L    Anion Gap 12 10 - 20 mmol/L    Urea Nitrogen 26 (H) 6 - 23 mg/dL    Creatinine 1.18 0.50 - 1.30 mg/dL    eGFR 59 (L) >60 mL/min/1.73m*2    Calcium 9.0 8.6 - 10.3 mg/dL    Albumin 3.6 3.4 - 5.0 g/dL    Alkaline Phosphatase 94 33 - 136 U/L    Total Protein 5.8 (L) 6.4 - 8.2 g/dL    AST 28 9 - 39 U/L    Bilirubin, Total 1.0 0.0 - 1.2 mg/dL    ALT 19 10 - 52 U/L   CBC and Auto Differential   Result Value Ref Range    WBC 6.0 4.4 - 11.3 x10*3/uL    nRBC 0.0 0.0 - 0.0 /100 WBCs    RBC 2.95 (L) 4.50 - 5.90 x10*6/uL    Hemoglobin 9.8 (L) 13.5 - 17.5 g/dL    Hematocrit 30.1 (L) 41.0 - 52.0 %     (H) 80 - 100 fL    MCH 33.2 26.0 - 34.0 pg    MCHC 32.6 32.0 - 36.0 g/dL    RDW 13.2 11.5 - 14.5 %    Platelets 188 150 - 450 x10*3/uL    Neutrophils % 57.1 40.0 - 80.0 %    Immature Granulocytes %, Automated 0.2 0.0 - 0.9 %    Lymphocytes % 28.8 13.0 - 44.0 %    Monocytes % 9.8 2.0 - 10.0 %    Eosinophils % 3.1 0.0 - 6.0 %    Basophils % 1.0 0.0 - 2.0 %    Neutrophils Absolute 3.45 1.60 - 5.50 x10*3/uL    Immature Granulocytes Absolute, Automated 0.01 0.00 - 0.50 x10*3/uL    Lymphocytes Absolute 1.74 0.80 - 3.00 x10*3/uL    Monocytes Absolute 0.59 0.05 - 0.80 x10*3/uL    Eosinophils Absolute 0.19 0.00 - 0.40 x10*3/uL    Basophils Absolute 0.06 0.00 - 0.10 x10*3/uL   Comprehensive Metabolic Panel   Result Value Ref Range    Glucose 104 (H) 74 - 99 mg/dL    Sodium 135 (L) 136 - 145 mmol/L    Potassium 5.0 3.5 - 5.3 mmol/L    Chloride 104 98 - 107 mmol/L    Bicarbonate 25 21 - 32 mmol/L    Anion Gap 11 10 - 20 mmol/L    Urea Nitrogen 28 (H) 6 - 23 mg/dL    Creatinine 1.21 0.50 - 1.30 mg/dL    eGFR 57 (L) >60 mL/min/1.73m*2    Calcium 8.8 8.6 - 10.3 mg/dL    Albumin 3.6 3.4 - 5.0 g/dL    Alkaline Phosphatase 96 33 - 136 U/L    Total Protein 5.9 (L) 6.4 - 8.2 g/dL    AST 27 9 - 39 U/L    Bilirubin, Total 1.0 0.0 - 1.2 mg/dL    ALT 19 10 - 52 U/L   CBC and Auto Differential   Result Value  Ref Range    WBC 6.3 4.4 - 11.3 x10*3/uL    nRBC 0.0 0.0 - 0.0 /100 WBCs    RBC 2.99 (L) 4.50 - 5.90 x10*6/uL    Hemoglobin 9.8 (L) 13.5 - 17.5 g/dL    Hematocrit 31.4 (L) 41.0 - 52.0 %     (H) 80 - 100 fL    MCH 32.8 26.0 - 34.0 pg    MCHC 31.2 (L) 32.0 - 36.0 g/dL    RDW 13.2 11.5 - 14.5 %    Platelets 192 150 - 450 x10*3/uL    Neutrophils % 64.0 40.0 - 80.0 %    Immature Granulocytes %, Automated 0.3 0.0 - 0.9 %    Lymphocytes % 21.4 13.0 - 44.0 %    Monocytes % 9.8 2.0 - 10.0 %    Eosinophils % 3.7 0.0 - 6.0 %    Basophils % 0.8 0.0 - 2.0 %    Neutrophils Absolute 4.00 1.60 - 5.50 x10*3/uL    Immature Granulocytes Absolute, Automated 0.02 0.00 - 0.50 x10*3/uL    Lymphocytes Absolute 1.34 0.80 - 3.00 x10*3/uL    Monocytes Absolute 0.61 0.05 - 0.80 x10*3/uL    Eosinophils Absolute 0.23 0.00 - 0.40 x10*3/uL    Basophils Absolute 0.05 0.00 - 0.10 x10*3/uL   Comprehensive Metabolic Panel   Result Value Ref Range    Glucose 116 (H) 74 - 99 mg/dL    Sodium 135 (L) 136 - 145 mmol/L    Potassium 4.9 3.5 - 5.3 mmol/L    Chloride 104 98 - 107 mmol/L    Bicarbonate 26 21 - 32 mmol/L    Anion Gap 10 10 - 20 mmol/L    Urea Nitrogen 28 (H) 6 - 23 mg/dL    Creatinine 1.09 0.50 - 1.30 mg/dL    eGFR 64 >60 mL/min/1.73m*2    Calcium 8.8 8.6 - 10.3 mg/dL    Albumin 3.7 3.4 - 5.0 g/dL    Alkaline Phosphatase 97 33 - 136 U/L    Total Protein 5.9 (L) 6.4 - 8.2 g/dL    AST 27 9 - 39 U/L    Bilirubin, Total 0.8 0.0 - 1.2 mg/dL    ALT 19 10 - 52 U/L   CBC and Auto Differential   Result Value Ref Range    WBC 7.0 4.4 - 11.3 x10*3/uL    nRBC 0.0 0.0 - 0.0 /100 WBCs    RBC 3.14 (L) 4.50 - 5.90 x10*6/uL    Hemoglobin 10.5 (L) 13.5 - 17.5 g/dL    Hematocrit 33.0 (L) 41.0 - 52.0 %     (H) 80 - 100 fL    MCH 33.4 26.0 - 34.0 pg    MCHC 31.8 (L) 32.0 - 36.0 g/dL    RDW 13.2 11.5 - 14.5 %    Platelets 194 150 - 450 x10*3/uL    Neutrophils % 69.5 40.0 - 80.0 %    Immature Granulocytes %, Automated 0.4 0.0 - 0.9 %    Lymphocytes %  17.5 13.0 - 44.0 %    Monocytes % 8.7 2.0 - 10.0 %    Eosinophils % 3.2 0.0 - 6.0 %    Basophils % 0.7 0.0 - 2.0 %    Neutrophils Absolute 4.85 1.60 - 5.50 x10*3/uL    Immature Granulocytes Absolute, Automated 0.03 0.00 - 0.50 x10*3/uL    Lymphocytes Absolute 1.22 0.80 - 3.00 x10*3/uL    Monocytes Absolute 0.61 0.05 - 0.80 x10*3/uL    Eosinophils Absolute 0.22 0.00 - 0.40 x10*3/uL    Basophils Absolute 0.05 0.00 - 0.10 x10*3/uL   POCT GLUCOSE   Result Value Ref Range    POCT Glucose 149 (H) 74 - 99 mg/dL   Lactate   Result Value Ref Range    Lactate 3.0 (H) 0.4 - 2.0 mmol/L   Troponin I, High Sensitivity   Result Value Ref Range    Troponin I, High Sensitivity 94 (HH) 0 - 20 ng/L   Comprehensive Metabolic Panel   Result Value Ref Range    Glucose 151 (H) 74 - 99 mg/dL    Sodium 135 (L) 136 - 145 mmol/L    Potassium 4.7 3.5 - 5.3 mmol/L    Chloride 104 98 - 107 mmol/L    Bicarbonate 20 (L) 21 - 32 mmol/L    Anion Gap 16 10 - 20 mmol/L    Urea Nitrogen 28 (H) 6 - 23 mg/dL    Creatinine 1.08 0.50 - 1.30 mg/dL    eGFR 65 >60 mL/min/1.73m*2    Calcium 9.2 8.6 - 10.3 mg/dL    Albumin 3.9 3.4 - 5.0 g/dL    Alkaline Phosphatase 105 33 - 136 U/L    Total Protein 6.3 (L) 6.4 - 8.2 g/dL    AST 29 9 - 39 U/L    Bilirubin, Total 1.1 0.0 - 1.2 mg/dL    ALT 19 10 - 52 U/L   CBC   Result Value Ref Range    WBC 9.5 4.4 - 11.3 x10*3/uL    nRBC 0.0 0.0 - 0.0 /100 WBCs    RBC 3.14 (L) 4.50 - 5.90 x10*6/uL    Hemoglobin 10.4 (L) 13.5 - 17.5 g/dL    Hematocrit 33.2 (L) 41.0 - 52.0 %     (H) 80 - 100 fL    MCH 33.1 26.0 - 34.0 pg    MCHC 31.3 (L) 32.0 - 36.0 g/dL    RDW 13.2 11.5 - 14.5 %    Platelets 219 150 - 450 x10*3/uL   Lactate   Result Value Ref Range    Lactate 5.9 (HH) 0.4 - 2.0 mmol/L   aPTT - baseline   Result Value Ref Range    aPTT 24 (L) 26 - 36 seconds   Protime-INR   Result Value Ref Range    Protime 13.5 (H) 9.8 - 12.4 seconds    INR 1.2 (H) 0.9 - 1.1   Renal function panel   Result Value Ref Range    Glucose 215  (H) 74 - 99 mg/dL    Sodium 133 (L) 136 - 145 mmol/L    Potassium 4.8 3.5 - 5.3 mmol/L    Chloride 104 98 - 107 mmol/L    Bicarbonate 15 (L) 21 - 32 mmol/L    Anion Gap 19 10 - 20 mmol/L    Urea Nitrogen 30 (H) 6 - 23 mg/dL    Creatinine 1.23 0.50 - 1.30 mg/dL    eGFR 56 (L) >60 mL/min/1.73m*2    Calcium 8.8 8.6 - 10.3 mg/dL    Phosphorus 3.7 2.5 - 4.9 mg/dL    Albumin 3.6 3.4 - 5.0 g/dL   Troponin I, High Sensitivity   Result Value Ref Range    Troponin I, High Sensitivity 100 (HH) 0 - 20 ng/L   Blood Gas Arterial Full Panel   Result Value Ref Range    POCT pH, Arterial 7.36 (L) 7.38 - 7.42 pH    POCT pCO2, Arterial 21 (L) 38 - 42 mm Hg    POCT pO2, Arterial 142 (H) 85 - 95 mm Hg    POCT SO2, Arterial 100 94 - 100 %    POCT Oxy Hemoglobin, Arterial 97.1 94.0 - 98.0 %    POCT Hematocrit Calculated, Arterial 31.0 (L) 41.0 - 52.0 %    POCT Sodium, Arterial 134 (L) 136 - 145 mmol/L    POCT Potassium, Arterial 4.9 3.5 - 5.3 mmol/L    POCT Chloride, Arterial 107 98 - 107 mmol/L    POCT Ionized Calcium, Arterial 1.10 1.10 - 1.33 mmol/L    POCT Glucose, Arterial 205 (H) 74 - 99 mg/dL    POCT Lactate, Arterial 6.6 (HH) 0.4 - 2.0 mmol/L    POCT Base Excess, Arterial -11.8 (L) -2.0 - 3.0 mmol/L    POCT HCO3 Calculated, Arterial 11.9 (L) 22.0 - 26.0 mmol/L    POCT Hemoglobin, Arterial 10.4 (L) 13.5 - 17.5 g/dL    POCT Anion Gap, Arterial 20 10 - 25 mmo/L    Patient Temperature 37.0 degrees Celsius    FiO2 36 %    Apparatus CANNULA     Flow 4.0 LPM    Critical Called By DARREN RIBEIRO RRT     Critical Called To DR. TUTTLE     Critical Call Time 1300     Critical Read Back Y     Site of Arterial Puncture Arterial Line    Blood Gas Arterial Full Panel   Result Value Ref Range    POCT pH, Arterial 7.30 (L) 7.38 - 7.42 pH    POCT pCO2, Arterial 19 (L) 38 - 42 mm Hg    POCT pO2, Arterial 127 (H) 85 - 95 mm Hg    POCT SO2, Arterial 99 94 - 100 %    POCT Oxy Hemoglobin, Arterial 97.7 94.0 - 98.0 %    POCT Hematocrit Calculated,  Arterial 32.0 (L) 41.0 - 52.0 %    POCT Sodium, Arterial 133 (L) 136 - 145 mmol/L    POCT Potassium, Arterial 4.8 3.5 - 5.3 mmol/L    POCT Chloride, Arterial 105 98 - 107 mmol/L    POCT Ionized Calcium, Arterial 1.10 1.10 - 1.33 mmol/L    POCT Glucose, Arterial 223 (H) 74 - 99 mg/dL    POCT Lactate, Arterial 9.6 (HH) 0.4 - 2.0 mmol/L    POCT Base Excess, Arterial -15.1 (L) -2.0 - 3.0 mmol/L    POCT HCO3 Calculated, Arterial 9.3 (L) 22.0 - 26.0 mmol/L    POCT Hemoglobin, Arterial 10.8 (L) 13.5 - 17.5 g/dL    POCT Anion Gap, Arterial 24 10 - 25 mmo/L    Patient Temperature 37.0 degrees Celsius    FiO2 36 %    Critical Called By DARREN RIBEIRO RRT     Critical Called To DR. SHRESTHA     Critical Call Time 1956     Critical Read Back Y    Heparin Assay   Result Value Ref Range    Heparin Unfractionated 1.0 See Comment Below for Therapeutic Ranges IU/mL   Blood Gas Arterial Full Panel   Result Value Ref Range    POCT pH, Arterial 7.26 (L) 7.38 - 7.42 pH    POCT pCO2, Arterial 17 (L) 38 - 42 mm Hg    POCT pO2, Arterial 134 (H) 85 - 95 mm Hg    POCT SO2, Arterial 99 94 - 100 %    POCT Oxy Hemoglobin, Arterial 97.3 94.0 - 98.0 %    POCT Hematocrit Calculated, Arterial 33.0 (L) 41.0 - 52.0 %    POCT Sodium, Arterial 135 (L) 136 - 145 mmol/L    POCT Potassium, Arterial 5.2 3.5 - 5.3 mmol/L    POCT Chloride, Arterial 104 98 - 107 mmol/L    POCT Ionized Calcium, Arterial 1.08 (L) 1.10 - 1.33 mmol/L    POCT Glucose, Arterial 181 (H) 74 - 99 mg/dL    POCT Lactate, Arterial 13.7 (HH) 0.4 - 2.0 mmol/L    POCT Base Excess, Arterial -17.3 (L) -2.0 - 3.0 mmol/L    POCT HCO3 Calculated, Arterial 7.6 (L) 22.0 - 26.0 mmol/L    POCT Hemoglobin, Arterial 10.9 (L) 13.5 - 17.5 g/dL    POCT Anion Gap, Arterial 29 (H) 10 - 25 mmo/L    Patient Temperature 37.0 degrees Celsius    FiO2 36 %    Apparatus CANNULA     Flow 4.0 LPM    Critical Called By DARREN RIBEIRO RRT     Critical Called To DR. SHRESTHA     Critical Call Time 0     Critical Read Back Y      Site of Arterial Puncture Arterial Line      XR chest 1 view  Result Date: 5/7/2025  Interpreted By:  Belkys Franklin, STUDY: XR CHEST 1 VIEW;  5/7/2025 3:58 pm   INDICATION: Signs/Symptoms:chest pain.   COMPARISON: 12/30/2024   ACCESSION NUMBER(S): [id number]   ORDERING CLINICIAN: MAURA EWING   FINDINGS: AP radiograph of the chest was provided.       CARDIOMEDIASTINAL SILHOUETTE: Persistently enlarged cardiomediastinal silhouette.   LUNGS: Small bilateral pleural effusions, increased from prior exam. No pneumothorax.   ABDOMEN: No remarkable upper abdominal findings.   BONES: No acute osseous changes.       Small bilateral pleural effusions, increased from prior exam. Superimposed infection could be considered in the appropriate clinical context. Unchanged cardiomegaly/pericardial effusion.   Signed by: Belkys Franklin 5/7/2025 4:44 PM Dictation workstation:   [id number]    US bladder  Result Date: 5/6/2025  Interpreted By:  Perry Coughlin, STUDY: US BLADDER;  5/6/2025 1:01 pm   INDICATION: Signs/Symptoms:Hematuria, blood clots.     COMPARISON: CT abdomen/pelvis of 05/02/2025.   ACCESSION NUMBER(S): [id number]   ORDERING CLINICIAN: SHANNON KEE   TECHNIQUE: Real-time sonographic evaluation of the urinary bladder was performed.   FINDINGS: BLADDER: Distended bladder has a calculated volume of  292 mL.   Zarco catheter with balloon is visualized in the urinary bladder. No additional intraluminal mass or shadowing calculus is identified.   Bladder empties completely via the Zarco catheter.       No focal urinary bladder abnormality identified.   Bladder empties completely via the Zarco catheter       MACRO: None.   Signed by: Perry Coughlin 5/6/2025 1:18 PM Dictation workstation:   [id number]    ECG 12 lead  Result Date: 5/5/2025  Atrial fibrillation with slow ventricular response Rightward axis Nonspecific intraventricular block Abnormal ECG When compared with ECG of 02-MAY-2025 16:48, (unconfirmed)  No significant change was found    CT abdomen pelvis wo IV contrast  Result Date: 5/3/2025  Interpreted By:  Raheem Cummins, STUDY: CT ABDOMEN PELVIS WO IV CONTRAST;  5/2/2025 9:01 pm   INDICATION: Signs/Symptoms:hematuria.     COMPARISON: CT abdomen and pelvis without contrast 30 January 2025   ACCESSION NUMBER(S): [id number]   ORDERING CLINICIAN: MAURA EWING   TECHNIQUE: CT of the abdomen and pelvis from the lung bases through the symphysis pubis without oral or IV contrast   FINDINGS: LOWER CHEST: Small free-flowing bilateral pleural effusions both new from 30 January 2025. Unchanged cardiomegaly   BONES: No acute skeletal findings.   RIGHT KIDNEY AND URETER: Radiodense stone: Two unchanged less than 3 mm (too small to accurately measure attenuation) nonobstructing renal stones, one at each pole. No new stone. None in the ureter noting the distal most several cm of the ureter are completely obscured by streak artifact from the hip prostheses Hydronephrosis: Negative Congenital variant anatomy: Negative. No duplicated ureter or other variant anatomy. Other: Large but simple parapelvic cyst or cysts unchanged   LEFT KIDNEY AND URETER: Radiodense stone: None, noting distal ureter obscured by streak artifact, but the left kidney had no stone on last CT, still does not Other: Decompressed by well-positioned Zarco catheter   URINARY BLADDER: Radiodense stone: Negative Wall thickening / other inflammatory change: Negative Diverticula: Negative Congenital variant anatomy: Negative. No variant anatomy such as urachal remnant. Other: n/a   LIVER: Normal. No enlargement or evidence of cirrhosis or fatty change. No gross evidence of a mass, noting low sensitivity and specificity without IV contrast.   SPLEEN: Normal. No enlargement.   PANCREAS: Normal. No CT evidence of acute or chronic pancreatitis. No obvious  duct dilation. No gross evidence of a mass, noting low sensitivity and specificity without IV contrast.    GALLBLADDER: Normal CT appearance. No dilation, calcified, or gas-containing stones.  Other types of gallstones could be occult on CT and detectable only by ultrasound.   BILE DUCTS: Normal. No biliary duct dilation.   ADRENAL GLANDS: Unchanged symmetric mild thickening   LYMPH NODES: No adenopathy, intraperitoneal, retroperitoneal, pelvic, inguinal or otherwise.   APPENDIX: Normal.  Not dilated, thick walled or in any other way inflamed in appearance.  No inflammatory change about the appendix.   COLON: Normal. No sign of acute diverticulitis or other colitis. No annular constricting mass.   SMALL BOWEL: Normal. No small bowel dilation or any other sign of small bowel obstruction.   STOMACH / DUODENUM: Grossly normal by CT which has limited sensitivity and specificity for the stomach and duodenum.   RETROPERITONEUM: Normal.  No acute hemorrhage or inflammatory change. Lymph nodes in a separate dedicated section.   OMENTUM, MESENTERY AND PERITONEAL SPACES: Free intraperitoneal air: Negative Free intraperitoneal fluid: Negative Abscess: Negative Other: Mild diffuse edema   PELVIS: Lower pelvis obscured by streak artifact from bilateral hip prostheses   VASCULATURE: Aortic and iliac atherosclerotic calcifications without aneurysm or other acute finding.   ABDOMINAL WALL: Hernia: Negative Other: Subcutaneous edema       No acute new process in the urinary tract or anywhere else in the abdomen or pelvis when compared to 30 January 2025   Same two tiny nonobstructing right renal stones, one at each pole   No new stone anywhere in the urinary tract   No hydroureteronephrosis on either side, only a large but simple parapelvic cysts at the right renal hilus, a common artifactual mimic for hydronephrosis   Urinary bladder completely decompressed by well-positioned Zarco catheter   Diffuse at least mild edema (subcutaneous, mesenteric, and trace deep pelvic free fluid)   New small bilateral pleural effusions   MACRO: None    Signed by: Raheem Cummins 5/3/2025 8:17 AM Dictation workstation:   [id number]         Assessment/Plan   IMP:  Patrick Vázquez is a 89 y.o. male with past medical history of HFrEF, NSTEMI 2022 status post PCI with stent placement, MVR status post MitraClip, COPD, prostate cancer, diverticulosis, and hypertension, who was originally admitted 5/1/2025 under the medicine service for urinary retention.  Urology was consulted as well as general surgery, and the patient was showing general signs of improvement.  On 5/7/2025, the patient began to have midsternal chest pain and hypotension; twelve-lead ECG showed changes similar to prior ECG left bundle branch block and atrial fibrillation, and high-sensitivity troponin did return elevated at 100.  There was concern the patient was experiencing an anterior infarct, and he was transferred to the ICU for further treatment.  The patient was given a loading dose of aspirin, started on IV heparin, and placed on IV pressors due to hypotension not responsive to fluid bolus.  The patient did also present with a lactic acidosis.  POCUS performed at the bedside evidenced low LVEF with minimal septal movement.  The patient did have an arterial line placed.  Of note, the patient is DNR CCA DNI CODE STATUS.  Palliative care was consulted to discuss goals of care and advance care planning.    Recommendations  - The patient confirmed he is DNR CCA DNI CODE STATUS, and his family was present for this as well  - The patient would like to continue treatment through today to see if there will be any improvement  - The patient and his family are looking forward to cardiology input  - Hospice care was discussed as an option of care with the patient and his family; they are not yet ready to make a decision on hospice services    5/8/2025-   The patient was drowsy but arousable to verbal stimuli.  He stated he is no longer having chest pain and that his breathing is improved, however, he is feeling  discomfort in his back, and he asked if he could sit in the bedside recliner.  I explained to the patient that he remains on epinephrine and Levophed infusions to support his cardiac function, and so transition to bedside recliner would be inadvisable due to persistent hypotension, and the patient and his family verbalized understanding.  I introduced the practice of palliative care and the services it provides.  I then reviewed at length with the patient and his family the clinical diagnosis/medical problems that the patient presented with and the treatments provided so far. We discussed the implications of the current circumstances and option plans going forward.  The patient's daughter asked if cardiology would be seeing the patient early this morning, and I discussed with her that cardiology will be able to evaluate the patient, although if there was an urgent procedure to be performed, interventional cardiology would have been involved and present at the hospital late last evening or even very early in the morning.  We discussed the patient is currently quite ill, and is on a form of life support in the way of his pressors.  The patient stated he is agreeable to continuing his current level of care, which may include increased oxygen supplementation if needed, so he is able to see if he will have any clinical improvement through the day.  I did review with the patient and his family the patient's documented CODE STATUS of DNR CCA DNI, and the patient verified he would not wish to have chest compressions nor intubation with mechanical ventilation.  We did discuss there are other options of oxygen supplementation if necessary prior to need for intubation, and the patient stated he would be agreeable to those, but not intubation, if necessary.  I discussed with the patient and his family our goal as a clinical team is to provide support and care that will benefit the patient, but also to minimize caused suffering,  and if there is treatment that would otherwise cause more harm than benefit, we would not recommend this to the patient, which can include cardiac intervention and escalated measures of care including resuscitative efforts, and the patient and his family verbalized understanding.  I did discuss with the patient and his family the option of care of hospice remains available if and when the patient feels comfort measures would be more appropriate.  I discussed with them that because the patient is on pressure support at this time, if he would elect to transition to hospice care, this would occur in the hospital setting in his current room, and that his IV pressors would be discontinued once under hospice services, but his care measures with be completely focused on comfort and alleviation of suffering.  The patient and his family verbalized understanding, and the patient's daughter stated her mother, the patient's wife, had been under hospice services prior to her passing.  I answered the patient's family's questions and concerns to the best of my ability and offered emotional support.  Palliative care will continue to follow.    Thank you for allowing us to participate in the care of this gentleman.  Should you have any further questions or concerns regarding his care, please do not hesitate to contact us via Haiku/Epic Chat (Deana Amador during weekdays work hours and Lucho Bronson during weekdays after hours and over the weekend)    (This note was generated with voice recognition software and may contain errors including spelling, grammar, syntax and misrecognition of what was dictated, that are not fully corrected)    Patient/proxy preference for information  Prefers full information    Goals of Care  The patient remains DNR CCA DNI CODE STATUS.  Goal is for continued treatment today.    Is the patient hospice-eligible?   Yes  Was a discussion held re hospice services?   yes  Was a decision made re hospice  services?  No, the family and the patient are not yet ready to make a decision regarding hospice care.    I spent 75 minutes in the professional and overall care of this patient.      Deana Amador, APRN-JAGUAR       [1]   Family History  Problem Relation Name Age of Onset    Breast cancer Daughter      Breast cancer Maternal Grandmother     [2] acetaminophen, 650 mg, oral, q6h  aspirin, 324 mg, oral, Daily  [Held by provider] furosemide, 20 mg, oral, Every other day  perflutren lipid microspheres, 0.5-10 mL of dilution, intravenous, Once in imaging  perflutren protein A microsphere, 0.5 mL, intravenous, Once in imaging  piperacillin-tazobactam, 3.375 g, intravenous, q6h  [Held by provider] spironolactone, 12.5 mg, oral, Daily  sulfur hexafluoride microsphr, 2 mL, intravenous, Once in imaging  vancomycin, 1,000 mg, intravenous, q24h  [3] EPINEPHrine, 0-1 mcg/kg/min (Dosing Weight), Last Rate: 1 mcg/kg/min (05/08/25 0701)  heparin, 0-4,000 Units/hr, Last Rate: 700 Units/hr (05/07/25 2304)  norepinephrine, 0-1 mcg/kg/min (Dosing Weight), Last Rate: 2 mcg/kg/min (05/08/25 0658)  vasopressin, 0-0.03 Units/min, Last Rate: 0.03 Units/min (05/08/25 0556)  [4] PRN medications: haloperidol lactate, heparin, HYDROmorphone, HYDROmorphone, LORazepam, ondansetron, vancomycin

## 2025-05-09 LAB
ATRIAL RATE: 169 BPM
PR INTERVAL: 288 MS
Q ONSET: 253 MS
QRS COUNT: 10 BEATS
QRS DURATION: 179 MS
QT INTERVAL: 462 MS
QTC CALCULATION(BAZETT): 470 MS
QTC FREDERICIA: 467 MS
R AXIS: 103 DEGREES
T AXIS: -66 DEGREES
T OFFSET: 484 MS
VENTRICULAR RATE: 62 BPM

## 2025-05-13 NOTE — DOCUMENTATION CLARIFICATION NOTE
"    PATIENT:               RAMSES VALENTE  ACCT #:                  [id number]  MRN:                       [id number]  :                       1935  ADMIT DATE:       2025 8:16 PM  DISCH DATE:        2025 9:27 AM  RESPONDING PROVIDER #:        [id number]          PROVIDER RESPONSE TEXT:    Sepsis with multi-system organ dysfunction of Renal with DOYLE, Hematological with thrombocytopenia, cardiac with NSTEMI type II with cardiogenic shock    CDI QUERY TEXT:    Clarification      Instruction:  Based on your assessment of the patient and the clinical information, please provide the requested documentation by clicking on the appropriate radio button and enter any additional information if prompted.    Question: Is there a diagnosis indicative of a patient meeting SIRS criteria and with organ dysfunction in the setting of Radiation cystitis    When answering this query, please exercise your independent professional judgment. The fact that a question is being asked, does not imply that any particular answer is desired or expected.    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    Clinical Indicators:    25 H&P Meruva \"Cardiogenic shock\"  25 - 25 Labs  Troponins 94, 100, >125,000    25 - 25  Labs  WBC 9.5 and 16.8  Creatinine 1.23 and 2.32   and 133  Lactate 5.9 and 18.4    25 VS  RR 22, 27, 28, and 28  /53, 97/54, 88/68, 84/51, and 88/54    25 PN Mckeon \"will remove the catheter given that he has radiation cystitis and that can irritate the catheter,\"      25 ECG  \"Atrial fibrillation with a competing junctional pacemaker with premature ventricular or aberrantly conducted complexes  Left axis deviation  Left bundle branch block  Abnormal ECG  When compared with ECG of 02-MAY-2025 22:15, (unconfirmed)  Questionable change in QRS axis  Inverted T waves have replaced nonspecific T wave abnormality in " "Inferior leads  Nonspecific T wave abnormality now evident in Anterior leads\"    5/8/25 ECG  \"Junctional rhythm  Nonspecific intraventricular conduction delay  Anterolateral infarct, recent\"        Treatment:  Cardiology Consult  Urology Consult  -heparin bolus from bag 4,000 Units on 5/7/25  -lactated Ringer's bolus 1,000 mL once x 1, then 500 mL once x 1  -Vancocin 1,000 mg in dextrose 5%  mL once x 1  -Rocephin 1 g in dextrose (iso) IV 50 mL every 24 hours x 6 doses  - Levophed 16 mg in sodium chloride 0.9% 250 mL (0.064 mg/mL) infusion x 26 doses    Risk Factors: Radiation Cystitis  Options provided:  -- Sepsis with multi-system organ dysfunction of Renal with DOYLE, Hematological with thrombocytopenia, cardiac with NSTEMI type II with cardiogenic shock  -- Sepsis with multi-system organ dysfunction of Renal with DOYLE, Hematological with thrombocytopenia, cardiac with NSTEMI  with cardiogenic shock  -- Sepsis with multi-system organ dysfunction of Renal with DOYLE, Hematological with thrombocytopenia, cardiac with cardiogenic shock  -- Sepsis with renal organ dysfunction of DOYLE  -- Sepsis with cardiac organ dysfunction of NSTEMI type II with cardiogenic shock  -- Sepsis with cardiac organ dysfunction of NSTEMI type with cardiogenic shock  -- Sepsis with hematological organ dysfunction of thrombocytopenia  -- Sepsis with other organ dysfunction, Please specify sepsis associated organ dysfunction below  -- Patient treated for radiation cystitis and NSTEMI type II without Sepsis  -- Patient treated for radiation cystitis and NSTEMI type without Sepsis  -- Patient treated for radiation cystitis without Sepsis  -- Other - I will add my own diagnosis  -- Refer to Clinical Documentation Reviewer    Query created by: Ry Brambila on 5/13/2025 6:57 AM      Electronically signed by:  TADEO ESCOBEDO DO 5/13/2025 7:07 AM          "

## 2025-05-14 LAB
ATRIAL RATE: 159 BPM
ATRIAL RATE: 92 BPM
DIASTOLIC BLOOD PRESSURE: 61 MMHG
Q ONSET: 192 MS
Q ONSET: 204 MS
QRS COUNT: 10 BEATS
QRS COUNT: 23 BEATS
QRS DURATION: 136 MS
QRS DURATION: 174 MS
QT INTERVAL: 338 MS
QT INTERVAL: 460 MS
QTC CALCULATION(BAZETT): 470 MS
QTC CALCULATION(BAZETT): 516 MS
QTC FREDERICIA: 448 MS
QTC FREDERICIA: 467 MS
R AXIS: -57 DEGREES
R AXIS: 262 DEGREES
SYSTOLIC BLOOD PRESSURE: 87 MMHG
T AXIS: -20 DEGREES
T AXIS: 73 DEGREES
T OFFSET: 373 MS
T OFFSET: 422 MS
VENTRICULAR RATE: 140 BPM
VENTRICULAR RATE: 63 BPM

## 2025-05-15 LAB
ATRIAL RATE: 60 BPM
Q ONSET: 213 MS
QRS COUNT: 9 BEATS
QRS DURATION: 164 MS
QT INTERVAL: 484 MS
QTC CALCULATION(BAZETT): 463 MS
QTC FREDERICIA: 470 MS
R AXIS: 178 DEGREES
T AXIS: -10 DEGREES
T OFFSET: 455 MS
VENTRICULAR RATE: 55 BPM

## 2025-05-28 LAB
ATRIAL RATE: 84 BPM
Q ONSET: 216 MS
QRS COUNT: 10 BEATS
QRS DURATION: 160 MS
QT INTERVAL: 466 MS
QTC CALCULATION(BAZETT): 461 MS
QTC FREDERICIA: 463 MS
R AXIS: 99 DEGREES
T AXIS: -7 DEGREES
T OFFSET: 449 MS
VENTRICULAR RATE: 59 BPM

## 2026-04-09 ENCOUNTER — APPOINTMENT (OUTPATIENT)
Dept: OPHTHALMOLOGY | Facility: CLINIC | Age: OVER 89
End: 2026-04-09
Payer: MEDICARE

## 2026-04-27 ENCOUNTER — APPOINTMENT (OUTPATIENT)
Dept: CARDIOLOGY | Facility: CLINIC | Age: OVER 89
End: 2026-04-27
Payer: MEDICARE

## (undated) DEVICE — Device

## (undated) DEVICE — IRRIGATION SET, CYSTOSCOPY, TURP, Y, CONTINUOUS, 81 IN

## (undated) DEVICE — SOLUTION, INJECTION, CONTRAST, OMNIPAQUE 240MG 50ML, PLUS PAK

## (undated) DEVICE — SYRINGE, 60 CC, IRRIGATION, PISTON, CATH TIP, W/LUER ADAPTER,DISP

## (undated) DEVICE — COLLECTION BAG, FLUID, NON-STERILE

## (undated) DEVICE — HOLSTER, ELECTROSURGERY ACCESSORY, STERILE